# Patient Record
Sex: FEMALE | Race: WHITE | NOT HISPANIC OR LATINO
[De-identification: names, ages, dates, MRNs, and addresses within clinical notes are randomized per-mention and may not be internally consistent; named-entity substitution may affect disease eponyms.]

---

## 2017-01-18 ENCOUNTER — CHARTING TRANS (OUTPATIENT)
Dept: OTHER | Age: 82
End: 2017-01-18

## 2017-01-24 ENCOUNTER — CHARTING TRANS (OUTPATIENT)
Dept: OTHER | Age: 82
End: 2017-01-24

## 2017-02-01 ENCOUNTER — CHARTING TRANS (OUTPATIENT)
Dept: OTHER | Age: 82
End: 2017-02-01

## 2017-03-25 ENCOUNTER — CHARTING TRANS (OUTPATIENT)
Dept: OTHER | Age: 82
End: 2017-03-25

## 2017-03-27 ENCOUNTER — CHARTING TRANS (OUTPATIENT)
Dept: OTHER | Age: 82
End: 2017-03-27

## 2017-04-04 ENCOUNTER — LAB SERVICES (OUTPATIENT)
Dept: OTHER | Age: 82
End: 2017-04-04

## 2017-04-04 ENCOUNTER — CHARTING TRANS (OUTPATIENT)
Dept: OTHER | Age: 82
End: 2017-04-04

## 2017-04-05 ENCOUNTER — CHARTING TRANS (OUTPATIENT)
Dept: OTHER | Age: 82
End: 2017-04-05

## 2017-04-05 LAB
ALBUMIN SERPL-MCNC: 3.6 G/DL (ref 3.6–5.1)
ALBUMIN/GLOB SERPL: 1.2 (ref 1–2.4)
ALP SERPL-CCNC: 98 UNITS/L (ref 45–117)
ALT SERPL-CCNC: 14 UNITS/L
ANION GAP SERPL CALC-SCNC: 10 MMOL/L (ref 10–20)
APPEARANCE UR: CLEAR
AST SERPL-CCNC: 12 UNITS/L
BASOPHILS # BLD: 0.1 K/MCL (ref 0–0.3)
BASOPHILS NFR BLD: 1 %
BILIRUB SERPL-MCNC: 0.8 MG/DL (ref 0.2–1)
BILIRUB UR QL: NEGATIVE
BUN SERPL-MCNC: 19 MG/DL (ref 6–20)
BUN/CREAT SERPL: 21 (ref 7–25)
CALCIUM SERPL-MCNC: 10.2 MG/DL (ref 8.4–10.2)
CHLORIDE SERPL-SCNC: 101 MMOL/L (ref 98–107)
CHOLEST SERPL-MCNC: 140 MG/DL
CHOLEST/HDLC SERPL: 2.1
CO2 SERPL-SCNC: 32 MMOL/L (ref 21–32)
COLOR UR: YELLOW
CREAT SERPL-MCNC: 0.9 MG/DL (ref 0.51–0.95)
DIFFERENTIAL METHOD BLD: ABNORMAL
EOSINOPHIL # BLD: 0.1 K/MCL (ref 0.1–0.5)
EOSINOPHIL NFR BLD: 1 %
ERYTHROCYTE [DISTWIDTH] IN BLOOD: 14.5 % (ref 11–15)
FOLATE SERPL-MCNC: 8.2 NG/ML
GLOBULIN SER-MCNC: 3.1 G/DL (ref 2–4)
GLUCOSE SERPL-MCNC: 166 MG/DL (ref 65–99)
GLUCOSE UR-MCNC: NEGATIVE MG/DL
HBA1C MFR BLD: 6.6 % (ref 4.5–5.6)
HDLC SERPL-MCNC: 67 MG/DL
HEMATOCRIT: 38.4 % (ref 36–46.5)
HEMOGLOBIN: 11.7 G/DL (ref 12–15.5)
KETONES UR-MCNC: NEGATIVE MG/DL
LDLC SERPL CALC-MCNC: 54 MG/DL
LENGTH OF FAST TIME PATIENT: ABNORMAL HRS
LENGTH OF FAST TIME PATIENT: ABNORMAL HRS
LYMPHOCYTES # BLD: 0.8 K/MCL (ref 1–4)
LYMPHOCYTES NFR BLD: 12 %
MEAN CORPUSCULAR HEMOGLOBIN: 26.5 PG (ref 26–34)
MEAN CORPUSCULAR HGB CONC: 30.5 G/DL (ref 32–36.5)
MEAN CORPUSCULAR VOLUME: 86.9 FL (ref 78–100)
MONOCYTES # BLD: 0.5 K/MCL (ref 0.3–0.9)
MONOCYTES NFR BLD: 7 %
NEUTROPHILS # BLD: 5.4 K/MCL (ref 1.8–7.7)
NEUTROPHILS NFR BLD: 79 %
NITRITE UR QL: NEGATIVE
NONHDLC SERPL-MCNC: 73 MG/DL
PH UR: 6 UNITS (ref 5–7)
PLATELET COUNT: 208 K/MCL (ref 140–450)
POTASSIUM SERPL-SCNC: 4 MMOL/L (ref 3.4–5.1)
PROT UR QL: NEGATIVE MG/DL
RBC-URINE: NEGATIVE
RED CELL COUNT: 4.42 MIL/MCL (ref 4–5.2)
RPR SER QL: NONREACTIVE
SODIUM SERPL-SCNC: 139 MMOL/L (ref 135–145)
SP GR UR: 1.01 (ref 1–1.03)
SPECIMEN SOURCE: NORMAL
TOTAL PROTEIN: 6.7 G/DL (ref 6.4–8.2)
TRIGL SERPL-MCNC: 95 MG/DL
TSH SERPL-ACNC: 1.49 MCUNITS/ML (ref 0.35–5)
UROBILINOGEN UR QL: 0.2 MG/DL (ref 0–1)
VIT B12 SERPL-MCNC: 1059 PG/ML (ref 211–911)
WBC-URINE: NEGATIVE
WHITE BLOOD COUNT: 6.8 K/MCL (ref 4.2–11)

## 2017-05-02 ENCOUNTER — CHARTING TRANS (OUTPATIENT)
Dept: OTHER | Age: 82
End: 2017-05-02

## 2017-05-31 ENCOUNTER — CHARTING TRANS (OUTPATIENT)
Dept: OTHER | Age: 82
End: 2017-05-31

## 2017-05-31 ASSESSMENT — PAIN SCALES - GENERAL: PAINLEVEL_OUTOF10: 1

## 2017-06-16 ENCOUNTER — CHARTING TRANS (OUTPATIENT)
Dept: OTHER | Age: 82
End: 2017-06-16

## 2017-06-21 ENCOUNTER — LAB ENCOUNTER (OUTPATIENT)
Dept: LAB | Age: 82
End: 2017-06-21
Attending: INTERNAL MEDICINE
Payer: MEDICARE

## 2017-06-21 DIAGNOSIS — R69 DIAGNOSIS UNKNOWN: Primary | ICD-10-CM

## 2017-06-21 PROCEDURE — 36415 COLL VENOUS BLD VENIPUNCTURE: CPT

## 2017-06-21 PROCEDURE — 80053 COMPREHEN METABOLIC PANEL: CPT

## 2017-06-21 PROCEDURE — 85025 COMPLETE CBC W/AUTO DIFF WBC: CPT

## 2017-06-28 ENCOUNTER — LAB ENCOUNTER (OUTPATIENT)
Dept: LAB | Age: 82
End: 2017-06-28
Attending: INTERNAL MEDICINE
Payer: MEDICARE

## 2017-06-28 DIAGNOSIS — S42.302A FRACTURE OF LEFT HUMERUS: Primary | ICD-10-CM

## 2017-06-28 PROCEDURE — 85025 COMPLETE CBC W/AUTO DIFF WBC: CPT

## 2017-06-28 PROCEDURE — 36415 COLL VENOUS BLD VENIPUNCTURE: CPT

## 2017-06-28 PROCEDURE — 80053 COMPREHEN METABOLIC PANEL: CPT

## 2017-07-04 ENCOUNTER — APPOINTMENT (OUTPATIENT)
Dept: CT IMAGING | Facility: HOSPITAL | Age: 82
DRG: 552 | End: 2017-07-04
Attending: EMERGENCY MEDICINE
Payer: MEDICARE

## 2017-07-04 ENCOUNTER — HOSPITAL ENCOUNTER (INPATIENT)
Facility: HOSPITAL | Age: 82
LOS: 16 days | Discharge: HOME HEALTH | DRG: 552 | End: 2017-07-22
Attending: EMERGENCY MEDICINE | Admitting: SPECIALIST
Payer: MEDICARE

## 2017-07-04 DIAGNOSIS — S12.100S TRAUMATIC CLOSED FRACTURE OF C2 VERTEBRA WITH MINIMAL DISPLACEMENT, SEQUELA: Primary | ICD-10-CM

## 2017-07-04 PROBLEM — S12.100A: Status: ACTIVE | Noted: 2017-07-04

## 2017-07-04 LAB — GLUCOSE BLD-MCNC: 149 MG/DL (ref 65–99)

## 2017-07-04 PROCEDURE — 72125 CT NECK SPINE W/O DYE: CPT | Performed by: EMERGENCY MEDICINE

## 2017-07-04 PROCEDURE — 82962 GLUCOSE BLOOD TEST: CPT

## 2017-07-04 RX ORDER — MECLIZINE HCL 12.5 MG/1
12.5 TABLET ORAL 3 TIMES DAILY PRN
Status: DISCONTINUED | OUTPATIENT
Start: 2017-07-04 | End: 2017-07-22

## 2017-07-04 RX ORDER — SIMVASTATIN 20 MG
20 TABLET ORAL NIGHTLY
COMMUNITY
End: 2021-01-01

## 2017-07-04 RX ORDER — CLOPIDOGREL BISULFATE 75 MG/1
75 TABLET ORAL DAILY
COMMUNITY

## 2017-07-04 RX ORDER — HEPARIN SODIUM 5000 [USP'U]/ML
5000 INJECTION, SOLUTION INTRAVENOUS; SUBCUTANEOUS EVERY 12 HOURS SCHEDULED
Status: DISCONTINUED | OUTPATIENT
Start: 2017-07-04 | End: 2017-07-22

## 2017-07-04 RX ORDER — ATORVASTATIN CALCIUM 10 MG/1
10 TABLET, FILM COATED ORAL NIGHTLY
Status: DISCONTINUED | OUTPATIENT
Start: 2017-07-04 | End: 2017-07-22

## 2017-07-04 RX ORDER — SODIUM CHLORIDE 9 MG/ML
INJECTION, SOLUTION INTRAVENOUS CONTINUOUS
Status: ACTIVE | OUTPATIENT
Start: 2017-07-04 | End: 2017-07-04

## 2017-07-04 RX ORDER — FUROSEMIDE 20 MG/1
20 TABLET ORAL 2 TIMES DAILY
Status: ON HOLD | COMMUNITY
End: 2017-07-08

## 2017-07-04 RX ORDER — HYDROCODONE BITARTRATE AND ACETAMINOPHEN 5; 325 MG/1; MG/1
1 TABLET ORAL EVERY 6 HOURS PRN
Status: ON HOLD | COMMUNITY
End: 2017-07-07

## 2017-07-04 RX ORDER — DONEPEZIL HYDROCHLORIDE 10 MG/1
10 TABLET, FILM COATED ORAL NIGHTLY
COMMUNITY
End: 2018-03-01

## 2017-07-04 RX ORDER — DONEPEZIL HYDROCHLORIDE 10 MG/1
10 TABLET, FILM COATED ORAL NIGHTLY
Status: DISCONTINUED | OUTPATIENT
Start: 2017-07-04 | End: 2017-07-22

## 2017-07-04 RX ORDER — ONDANSETRON 2 MG/ML
4 INJECTION INTRAMUSCULAR; INTRAVENOUS EVERY 6 HOURS PRN
Status: DISCONTINUED | OUTPATIENT
Start: 2017-07-04 | End: 2017-07-22

## 2017-07-04 RX ORDER — DOCUSATE SODIUM 100 MG/1
100 CAPSULE, LIQUID FILLED ORAL AS NEEDED
COMMUNITY

## 2017-07-04 RX ORDER — CLOPIDOGREL BISULFATE 75 MG/1
75 TABLET ORAL DAILY
Status: DISCONTINUED | OUTPATIENT
Start: 2017-07-04 | End: 2017-07-22

## 2017-07-04 RX ORDER — ONDANSETRON 2 MG/ML
4 INJECTION INTRAMUSCULAR; INTRAVENOUS EVERY 4 HOURS PRN
Status: DISCONTINUED | OUTPATIENT
Start: 2017-07-04 | End: 2017-07-04

## 2017-07-04 RX ORDER — HYDROMORPHONE HYDROCHLORIDE 1 MG/ML
0.5 INJECTION, SOLUTION INTRAMUSCULAR; INTRAVENOUS; SUBCUTANEOUS EVERY 30 MIN PRN
Status: DISCONTINUED | OUTPATIENT
Start: 2017-07-04 | End: 2017-07-04

## 2017-07-04 RX ORDER — DOCUSATE SODIUM 100 MG/1
100 CAPSULE, LIQUID FILLED ORAL NIGHTLY
Status: DISCONTINUED | OUTPATIENT
Start: 2017-07-04 | End: 2017-07-22

## 2017-07-04 RX ORDER — MECLIZINE HCL 12.5 MG/1
12.5 TABLET ORAL 3 TIMES DAILY PRN
Status: ON HOLD | COMMUNITY
End: 2017-07-20

## 2017-07-04 RX ORDER — HYDROMORPHONE HYDROCHLORIDE 1 MG/ML
0.5 INJECTION, SOLUTION INTRAMUSCULAR; INTRAVENOUS; SUBCUTANEOUS EVERY 4 HOURS PRN
Status: DISCONTINUED | OUTPATIENT
Start: 2017-07-04 | End: 2017-07-07

## 2017-07-04 RX ORDER — FUROSEMIDE 20 MG/1
20 TABLET ORAL
Status: DISCONTINUED | OUTPATIENT
Start: 2017-07-04 | End: 2017-07-08

## 2017-07-04 NOTE — ED NOTES
[facility] called, this RN spoke to patient's nurse requesting for medical records to be faxed to [facility] due to being unable to obtain medical records from [facility]. Per nurse at [facility], medical records to be fa

## 2017-07-04 NOTE — ED NOTES
Patient to CT via stretcher by PCT Yuliya Chatterjee. Patient cleaned from having bowel movement, new depends brief applied. Patient tolerated well. Family remains at bedside.

## 2017-07-04 NOTE — ED PROVIDER NOTES
Patient Seen in: [facility] Emergency Department    History   Patient presents with:  Neck Pain (musculoskeletal, neurologic)    Stated Complaint: neck pain, hx of c2 fx    HPI    Patient presents with neck pain.   The patient fell 3 weeks ago and pacheco history on file. Tobacco/Alcohol use not on file    Review of Systems    Positive for stated complaint: neck pain, hx of c2 fx  Other systems are as noted in HPI. Constitutional and vital signs reviewed.       All other systems reviewed and negative exc transmitted to the [facility]) Bhakti Reyes [address] ([address]) which includes the Dose Index Registry.   PATIENT STATED HISTORY: (As transcribed by Technologist)  C2 fx, complains of neck pain to right shoulder    FINDINGS: was performed.    Dictated by: Krystian Ludwig MD on 7/04/2017 at 15:48     Approved by: Krystian Ludwig MD          ============================================================  ED Course  ------------------------------------------------------------

## 2017-07-04 NOTE — PROGRESS NOTES
Patient arrived to floor, no family at bedside, patient is non-verbal, aspen collar in place, does not appear to be in pain, paged for admitting orders

## 2017-07-04 NOTE — ED INITIAL ASSESSMENT (HPI)
81 y/o female to ED with c/o of neck pain with radiation to right shoulder. [place] [facility] [place] to getting rehab for C2 fx she was dx with x3 weeks ago after having a fall.  Per daughter, patient was crying in the morning due to pain

## 2017-07-04 NOTE — ED NOTES
Report given to floor RN Angie Myers, patient continues to rest, equal chest and rise. Patient VSS, patient awaiting transportation, patient has Aspen collar in place.

## 2017-07-05 ENCOUNTER — LAB ENCOUNTER (OUTPATIENT)
Dept: LAB | Age: 82
End: 2017-07-05
Attending: INTERNAL MEDICINE
Payer: MEDICARE

## 2017-07-05 ENCOUNTER — APPOINTMENT (OUTPATIENT)
Dept: GENERAL RADIOLOGY | Facility: HOSPITAL | Age: 82
DRG: 552 | End: 2017-07-05
Attending: ORTHOPAEDIC SURGERY
Payer: MEDICARE

## 2017-07-05 DIAGNOSIS — S42.411A RIGHT SUPRACONDYLAR HUMERUS FRACTURE: Primary | ICD-10-CM

## 2017-07-05 LAB
GLUCOSE BLD-MCNC: 124 MG/DL (ref 65–99)
GLUCOSE BLD-MCNC: 170 MG/DL (ref 65–99)
GLUCOSE BLD-MCNC: 181 MG/DL (ref 65–99)
GLUCOSE BLD-MCNC: 218 MG/DL (ref 65–99)

## 2017-07-05 PROCEDURE — 87081 CULTURE SCREEN ONLY: CPT | Performed by: SPECIALIST

## 2017-07-05 PROCEDURE — 82962 GLUCOSE BLOOD TEST: CPT

## 2017-07-05 PROCEDURE — 99221 1ST HOSP IP/OBS SF/LOW 40: CPT | Performed by: NEUROLOGICAL SURGERY

## 2017-07-05 PROCEDURE — 73030 X-RAY EXAM OF SHOULDER: CPT | Performed by: ORTHOPAEDIC SURGERY

## 2017-07-05 RX ORDER — TRAMADOL HYDROCHLORIDE 50 MG/1
50 TABLET ORAL EVERY 12 HOURS PRN
Status: DISCONTINUED | OUTPATIENT
Start: 2017-07-05 | End: 2017-07-08

## 2017-07-05 RX ORDER — ACETAMINOPHEN 325 MG/1
650 TABLET ORAL EVERY 6 HOURS PRN
Status: DISCONTINUED | OUTPATIENT
Start: 2017-07-05 | End: 2017-07-22

## 2017-07-05 RX ORDER — DEXTROSE AND SODIUM CHLORIDE 5; .45 G/100ML; G/100ML
INJECTION, SOLUTION INTRAVENOUS CONTINUOUS
Status: DISCONTINUED | OUTPATIENT
Start: 2017-07-05 | End: 2017-07-06

## 2017-07-05 NOTE — PROGRESS NOTES
Received report from PM RN. Patient has removed aspen collar. Attempted to use phone  to try to tell patient to leave collar in place, patient responding as she does not understand. Aspen collar reapplied.  Patient told \"niet\" when attempting to

## 2017-07-05 NOTE — PLAN OF CARE
Diabetes/Glucose Control    • Glucose maintained within prescribed range Progressing        PAIN - ADULT    • Verbalizes/displays adequate comfort level or patient's stated pain goal Progressing        SAFETY ADULT - FALL    • Free from fall injury Progres

## 2017-07-05 NOTE — H&P
[facility]    PATIENT'S NAME: Rahel García   ATTENDING PHYSICIAN: Micheal Hill M.D.    PATIENT ACCOUNT#:   [de-identified]    LOCATION:  [address] [place] [address]  MEDICAL RECORD #:   [id number]       YOB: 1924  ADMISSION DATE:       07/04/2017 She has known fractures of right upper extremity and C2 fracture. SKIN:  Defer to nursing. LABORATORY DATA:  Blood glucose 170. CBC, CMP currently pending. IMPRESSION AND PLAN:    1. History of prior fall, history of prior C2 fracture.   Currentl

## 2017-07-05 NOTE — SLP NOTE
ADULT SWALLOWING EVALUATION    ASSESSMENT & PLAN   ASSESSMENT  Pt seen at bedside this PM for swallow evaluation. Speech consulted due to pt admitting with modified diet consistency. Pt cooperative, pleasant, and alert.  No family or caregivers present at b Admission:  Thin liquids;Puree  Precautions: Aspiration;Cervical brace    Comments: Patient is an elderly female with history of neck pain and a fall around 3 weeks back and sustained a C2 fracture and was treated and admitted in another outside hospital. #1 Video swallow study to be completed     FOLLOW UP  Treatment Plan: Videofluoroscopic swallow study  Number of Visits to Meet Established Goals: 2  Follow Up Needed: Yes  SLP Follow-up Date: 07/06/17    Thank you for your referral.   If you have any ques

## 2017-07-05 NOTE — CONSULTS
[facility] Patient Status:  Observation    1924 MRN [id number]   [facility] 3SW-A Attending Peace Busby MD   Hosp Day # 0 PCP Jazzmine Obrien MD     REASON FOR CONSULTATION:  Clemencia Hanks hours as needed for Pain. Disp:  Rfl:     simvastatin 20 MG Oral Tab Take 20 mg by mouth nightly. Disp:  Rfl:     Meclizine HCl 12.5 MG Oral Tab Take 12.5 mg by mouth 3 (three) times daily as needed.  Disp:  Rfl:     Donepezil HCl 10 MG Oral Tab Take 10 mg There is approximately 7.3 mm of posterior displacement of the odontoid in comparison to the posterior C2 body. There is some calcification and bony fragments along the posterior border of the odontoid.  There is some soft tissue prominence in this region a

## 2017-07-05 NOTE — CM/SW NOTE
07/05/17 1300   CM/SW Referral Data   Referral Source Nurse   Reason for Referral Discharge planning   Informant Children;Edward Staff   Pertinent Medical Hx   Primary Care Physician Name HANNAH Coulter MD   Patient Info   Patient's Mental Status Alert   [place] office. Nursing home Medicaid guide located via [employer] -printed for son re: spousal impoverishment law and pt income to NH. Update sent via [facility] to [facility]. Will keep facility updated re: d/c date.

## 2017-07-05 NOTE — CONSULTS
[facility]  Report of Consultation    Matti Bahena Patient Status:  Observation    1924 MRN [id number]   [facility] 3SW-A Attending Marcello Alas MD   Hosp Day # 0 PCP Karmen Perdomo MD     Reason for Consultation:  R shoulder nystatin-triamcinolone (MYCOLOG II) cream, , Topical, QID  •  atorvastatin (LIPITOR) tab 10 mg, 10 mg, Oral, Nightly    Review of Systems:  A comprehensive review of systems was negative. Physical Exam:    General: Alert, orientated x1. Cooperative.   Shelley Cage sequela      R prox humerus fx    X-rays show good healing of the fx, likely older than the stated 3 wks  As the patient has min pain, OK to go without sling  WBAT  R UE, can move shoulder as tolerated  Will sign off, please have the patient f/u as outpati

## 2017-07-05 NOTE — PAYOR COMM NOTE
--------------  ADMISSION REVIEW     Payor: MEDICARE PART B ONLY  Subscriber #:  [id number]  Authorization Number: N/A    Admit date: 7/4/2017  1:31 PM       Admitting Physician: Parish Leal MD  Attending Physician:   Parish Leal MD  Primary Care Bisulfate 75 MG Oral Tab,  Take 75 mg by mouth daily. HYDROcodone-acetaminophen 5-325 MG Oral Tab,  Take 1 tablet by mouth every 6 (six) hours as needed for Pain.   simvastatin 20 MG Oral Tab,  Take 20 mg by mouth nightly.    Meclizine HCl 12.5 MG Oral Ta SPINE WITHOUT CONTRAST  COMPARISON:  None. INDICATIONS:  neck pain, hx of c2 fx  TECHNIQUE:  Noncontrast CT scanning of the cervical spine is performed from the skull base through C7. Multiplanar reconstructions are generated.   Dose reduction techniques the canal at this level is 1.06 cm. The dimension of the foramen magnum is 1.6 cm. On the lateral views, there is posterior subluxation of the lateral masses of C1 in relationship to C2.    The critical value results were called to Dr. Velasquez Kumari at [facility] Date Action Dose Route User    7/4/2017 2053 Given 75 mg Oral Felicity Escamilla RN      Donepezil HCl (ARICEPT) tab 10 mg     Date Action Dose Route User    7/4/2017 2054 Given 10 mg Oral Felicity Escamilla RN      Heparin Sodium (Porcine) 5000 UNIT/ML inj

## 2017-07-06 ENCOUNTER — APPOINTMENT (OUTPATIENT)
Dept: CT IMAGING | Facility: HOSPITAL | Age: 82
DRG: 552 | End: 2017-07-06
Attending: Other
Payer: MEDICARE

## 2017-07-06 ENCOUNTER — APPOINTMENT (OUTPATIENT)
Dept: GENERAL RADIOLOGY | Facility: HOSPITAL | Age: 82
DRG: 552 | End: 2017-07-06
Attending: SPECIALIST
Payer: MEDICARE

## 2017-07-06 LAB
ALBUMIN SERPL-MCNC: 2.6 G/DL (ref 3.5–4.8)
ALP LIVER SERPL-CCNC: 107 U/L (ref 55–142)
ALT SERPL-CCNC: 9 U/L (ref 14–54)
AST SERPL-CCNC: 8 U/L (ref 15–41)
BASOPHILS # BLD AUTO: 0.01 X10(3) UL (ref 0–0.1)
BASOPHILS NFR BLD AUTO: 0.1 %
BILIRUB SERPL-MCNC: 0.8 MG/DL (ref 0.1–2)
BUN BLD-MCNC: 28 MG/DL (ref 8–20)
CALCIUM BLD-MCNC: 9.5 MG/DL (ref 8.3–10.3)
CHLORIDE: 102 MMOL/L (ref 101–111)
CO2: 31 MMOL/L (ref 22–32)
CREAT BLD-MCNC: 0.84 MG/DL (ref 0.55–1.02)
EOSINOPHIL # BLD AUTO: 0.11 X10(3) UL (ref 0–0.3)
EOSINOPHIL NFR BLD AUTO: 1.2 %
ERYTHROCYTE [DISTWIDTH] IN BLOOD BY AUTOMATED COUNT: 14.3 % (ref 11.5–16)
FREE T4: 1.3 NG/DL (ref 0.9–1.8)
GLUCOSE BLD-MCNC: 201 MG/DL (ref 65–99)
GLUCOSE BLD-MCNC: 229 MG/DL (ref 65–99)
GLUCOSE BLD-MCNC: 229 MG/DL (ref 70–99)
GLUCOSE BLD-MCNC: 235 MG/DL (ref 65–99)
GLUCOSE BLD-MCNC: 258 MG/DL (ref 65–99)
HAV AB SERPL IA-ACNC: 1304 PG/ML (ref 193–986)
HCT VFR BLD AUTO: 30.2 % (ref 34–50)
HCT VFR BLD AUTO: 32.2 % (ref 34–50)
HGB BLD-MCNC: 9.3 G/DL (ref 12–16)
IMMATURE GRANULOCYTE COUNT: 0.07 X10(3) UL (ref 0–1)
IMMATURE GRANULOCYTE RATIO %: 0.8 %
LYMPHOCYTES # BLD AUTO: 0.81 X10(3) UL (ref 0.9–4)
LYMPHOCYTES NFR BLD AUTO: 9.2 %
M PROTEIN MFR SERPL ELPH: 6 G/DL (ref 6.1–8.3)
MCH RBC QN AUTO: 27.8 PG (ref 27–33.2)
MCHC RBC AUTO-ENTMCNC: 30.8 G/DL (ref 31–37)
MCV RBC AUTO: 90.4 FL (ref 81–100)
MONOCYTES # BLD AUTO: 0.73 X10(3) UL (ref 0.1–0.6)
MONOCYTES NFR BLD AUTO: 8.3 %
NEUTROPHIL ABS PRELIM: 7.1 X10 (3) UL (ref 1.3–6.7)
NEUTROPHILS # BLD AUTO: 7.1 X10(3) UL (ref 1.3–6.7)
NEUTROPHILS NFR BLD AUTO: 80.4 %
PLATELET # BLD AUTO: 276 10(3)UL (ref 150–450)
POTASSIUM SERPL-SCNC: 3.5 MMOL/L (ref 3.6–5.1)
RBC # BLD AUTO: 3.34 X10(6)UL (ref 3.8–5.1)
RED CELL DISTRIBUTION WIDTH-SD: 46.9 FL (ref 35.1–46.3)
SED RATE-ML: 52 MM/HR (ref 0–25)
SODIUM SERPL-SCNC: 137 MMOL/L (ref 136–144)
TSI SER-ACNC: 1.25 MIU/ML (ref 0.35–5.5)
WBC # BLD AUTO: 8.8 X10(3) UL (ref 4–13)

## 2017-07-06 PROCEDURE — 95816 EEG AWAKE AND DROWSY: CPT | Performed by: OTHER

## 2017-07-06 PROCEDURE — 99222 1ST HOSP IP/OBS MODERATE 55: CPT | Performed by: OTHER

## 2017-07-06 PROCEDURE — 82962 GLUCOSE BLOOD TEST: CPT

## 2017-07-06 PROCEDURE — 70450 CT HEAD/BRAIN W/O DYE: CPT | Performed by: OTHER

## 2017-07-06 PROCEDURE — 74230 X-RAY XM SWLNG FUNCJ C+: CPT | Performed by: SPECIALIST

## 2017-07-06 RX ORDER — DEXTROSE AND SODIUM CHLORIDE 5; .9 G/100ML; G/100ML
INJECTION, SOLUTION INTRAVENOUS CONTINUOUS
Status: DISCONTINUED | OUTPATIENT
Start: 2017-07-06 | End: 2017-07-22

## 2017-07-06 NOTE — OCCUPATIONAL THERAPY NOTE
OCCUPATIONAL THERAPY EVALUATION - INPATIENT     Room Number: 351/351-A  Evaluation Date: 7/6/2017  Type of Evaluation: Initial  Presenting Problem: Neck pain, recent C2 fx 3 weeks ago    Physician Order: IP Consult to Occupational Therapy  [facility]; patient required assist for bathing and sock management, otherwise independent for all BADLs, bed mobility and functional mobility without device; caregiver primarily assisted with IADLs.  Daughter-in-law reports since being at [facility] the last tw not tested due to fracture    COORDINATION  Gross Motor    WFL ELIZABETH BELL limited   Fine Motor    WFL      ADDITIONAL TESTS                                    NEUROLOGICAL FINDINGS  Neurological Findings: None                ACTIVITY TOLERANCE  O2 Saturation to min assist. Patient also educated on safety, fall prevention, OT role, care plan and discharge recommendations.  Patient's neck noted to be tilted to right in Colby collar; notified RN, who states neurosurgery APN is aware; per RN request left as-is and activities; ADL training;Functional transfer training; Endurance training;Patient/Family education;Patient/Family training  Rehab Potential : Fair  Frequency (Obs): 5x/week  Number of Visits to Meet Established Goals: 5    ADL Goals  Patient will perform upp

## 2017-07-06 NOTE — PLAN OF CARE
Impaired Swallowing    • Minimize aspiration risk Progressing        PAIN - ADULT    • Verbalizes/displays adequate comfort level or patient's stated pain goal Progressing        Patient/Family Goals    • Patient/Family Long Term Goal Progressing    • Justa

## 2017-07-06 NOTE — PROGRESS NOTES
[facility]  Progress Note    Malachi Apgar Patient Status:  Inpatient    1924 MRN [id number]   [facility] 3SW-A Attending Emilia Sweeney MD   Hosp Day # 0 PCP Dolores Singleton MD         SUBJECTIVE:  Subjective:  Malachi Apgar is a(n times per day   • Clopidogrel Bisulfate  75 mg Oral Daily   • docusate sodium  100 mg Oral Nightly   • Donepezil HCl  10 mg Oral Nightly   • furosemide  20 mg Oral BID (Diuretic)   • MetFORMIN HCl  500 mg Oral Daily with breakfast   • nystatin-triamcinolon

## 2017-07-06 NOTE — PHYSICAL THERAPY NOTE
PHYSICAL THERAPY EVALUATION - INPATIENT     Room Number: 351/351-A  Evaluation Date: 7/6/2017  Type of Evaluation: Initial  Physician Order: PT Eval and Treat    Presenting Problem: S/p Fall - 3 weeks ago - C2 Fx, Right proximal Humerus Fx  Reason for [facility] for past 2 weeks after [facility] & was receiving P.T.  SUBJECTIVE  Patient reported to have pain neck & right shoulder    Patient self-stated goal: Unable to establish this time.  Daughter in law stated their family Hanna Raman bed?: A Lot (Supine to sit with mod assist,Sit to supine with min assist)   How much help from another person does the patient currently need. ..   -   Moving to and from a bed to a chair (including a wheelchair)?: A Little   -   Need to walk in hospital ro recommendations with /    ASSESSMENT     Patient is a 80year old female admitted 7/4/2017 for S/p Fall - 3 weeks ago - C2 Fx, Right proximal Humerus Fx.    In this PT evaluation, the patient presents with the following impairments:

## 2017-07-06 NOTE — CONSULTS
Neurology H&P    Coreen Bolivar Patient Status:  Inpatient    1924 MRN [id number]   [facility] 3SW-A Attending Amy Feldman MD   Hosp Day # 0 PCP Tung Escamilla MD     Subjective:  Coreen Bolivar is a(n) 80year old [place] speaking fem weight loss  Vision: no vision changes, no new blurry or double vision  Head and Neck: no eye or ear discharge  Pulmonary: no SOB, no new cough  CV: no chest pain, no new lower extremity swelling  GI: no constipation or abdominal pain  : denies frequent 1,304 07/06/2017   PGLU 201 07/06/2017       Imaging:  [facility] 7/6/17  CONCLUSION:  No acute intracranial abnormality identified.  Moderate age-indeterminate microvascular ischemic changes in the cerebral white matter along with multiple scattered age-indetermi

## 2017-07-06 NOTE — PROGRESS NOTES
Patient's son brought in CT imaging from [address] this evening. Discs sent to CT scan to be uploaded to [employer] system for review by Dr Eri Masters.

## 2017-07-06 NOTE — PLAN OF CARE
Diabetes/Glucose Control    • Glucose maintained within prescribed range Progressing        Impaired Swallowing    • Minimize aspiration risk Progressing        PAIN - ADULT    • Verbalizes/displays adequate comfort level or patient's stated pain goal Prog

## 2017-07-06 NOTE — PROGRESS NOTES
[address] [facility] Progress Note    Jodie Sanford Patient Status:  Inpatient    1924 MRN [id number]   [facility] 3SW-A Attending Mason Luna MD   Hosp Day # 0 PCP Enma Weiss MD     Subjective:  Derricklin Juvenal is a(n

## 2017-07-06 NOTE — PROGRESS NOTES
[address] [facility] Preliminary Note    Champ Cull Patient Status:  Inpatient    1924 MRN [id number]   [facility] 3SW-A Attending Marcello Alas MD   Hosp Day # 0 PCP Karmen Perdomo MD     REASON FOR EVALUATION:  Altered per day   Clopidogrel Bisulfate (PLAVIX) tab 75 mg 75 mg Oral Daily   docusate sodium (COLACE) cap 100 mg 100 mg Oral Nightly   Donepezil HCl (ARICEPT) tab 10 mg 10 mg Oral Nightly   furosemide (LASIX) tab 20 mg 20 mg Oral BID (Diuretic)   Meclizine HCl (A 07/06/2017    07/06/2017   CO2 31.0 07/06/2017    07/06/2017   CA 9.5 07/06/2017   ALB 2.6 07/06/2017   ALKPHO 107 07/06/2017   BILT 0.8 07/06/2017   TP 6.0 07/06/2017   AST 8 07/06/2017   ALT 9 07/06/2017   PGLU 235 07/06/2017              IM

## 2017-07-06 NOTE — PROGRESS NOTES
Patient's son at bedside, frustrated with current NPO status per speech therapy. Explained to family that due to our limited record access we are treating her cautiously as all ST notes are showing that she has failed several swallow evaluations.  Swallow s

## 2017-07-06 NOTE — PLAN OF CARE
Spoke with Dr. Loulou Rooeny regarding speech therapy recommendations - will consult GI. Spoke with Dr. Fazal Perkins, will see patient tomorrow. [place] [facility]aj [facility]. Will continue to monitor.

## 2017-07-06 NOTE — VIDEO SWALLOW STUDY NOTE
ADULT VIDEOFLUOROSCOPIC SWALLOWING STUDY    Admission Date: 7/4/2017  Evaluation Date: 07/06/17  Radiologist: Dr. Umm Tamez  .     PLAN   Diet Recommendations - Solids:  (alternative feeding route)  Diet Recommendations - Liquid:  (alternative feeding route consistencies. Unable to assess volitional cough/swallow and oral motor abilities due to language barrier and decreased cognitive status. Trial thin liquids via cup, self presented, with no overt s/s of aspiration.  Trial pureed with no overt s/s of aspi 1.55    min.          Findings:        Penetration on the nectar thick consistency of barium and aspiration on the honey thick consistency.        For further details and dietary recommendations, please refer to the full report by speech pathology.      spillage into pharynx  (lingual pumping)   Thin Liquid, Oral Control Impairments (Vfss) formation of cohesive bolus is impaired;oral containment of liquids impaired posteriorly   Thin Liquid, Pharyngeal Phase Impairment material reaches pyriform sinuses pr swallow;decreased tonque base movement;reduction in laryngeal closure;decreased contraction;decreased coordination;pyriform pooling;decreased epiglottic inversion;penetration during;vallecular residue;pharyngeal wall residue;mild;moderate;repeated swallow movement;decreased hyolaryngeal elevation;decreased contraction;decreased coordination;vallecular pooling;decreased epiglottic inversion;pharyngeal wall residue;vallecular residue;aspiration after;moderate-severe;mild;repeated swallow   Emily Zeng residue;vallecular residue;moderate-severe;mild;repeated swallow   Puree, Rosenbek's Scale 1-->no aspiration, contrast does not enter airway   Puree, Response to Pharyngeal Residue (Vfss) unable to clear with multiple swallows   Puree, Epiglottic Movement epiglottic inversion with deep penetration of nectar thick liquid to the vocal folds, and suspected silent aspiration after the swallow.  Moderate to moderate-severe vallecular residue is noted across all consistencies which pt is unable to clear with repea hypertension        Current Diet Consistency: NPO  Prior Level of Function: Dependent  Prior Living Situation: Skilled nursing facility  History of Recent: No recent respiratory difficulty  Precautions: Aspiration;Cervical brace      Reason for Referral: R Spillage to: Valleculae  Delay (seconds):  (1/2 sec)  Residue Severity, Location: Severe;Valleculae;Pyriform sinuses  Cleared/Reduced with: Attempted however ineffective; Secondary swallow  Laryngeal Penetration: After the swallow  Tracheal Aspiration:  (no puree texture into the superior laryngeal vestibule. No cough in response to penetration. Swallow response initiated at the level of the pyriform sinuses on the initial swallow and at the level of the valleculae with all other trials.   Reduced hyolaryngea perform Mendelsohn exercises to promote improved epiglottic inversion and hyolaryngeal elevation for improved CP opening wit 80% acc with max cues.    Goal #4 Patient will perform Janis Maneuver for improved strengthening of of tongue base to promote impro

## 2017-07-07 ENCOUNTER — APPOINTMENT (OUTPATIENT)
Dept: GENERAL RADIOLOGY | Facility: HOSPITAL | Age: 82
DRG: 552 | End: 2017-07-07
Attending: INTERNAL MEDICINE
Payer: MEDICARE

## 2017-07-07 PROBLEM — Z71.89 GOALS OF CARE, COUNSELING/DISCUSSION: Status: ACTIVE | Noted: 2017-07-07

## 2017-07-07 PROBLEM — Z51.5 PALLIATIVE CARE ENCOUNTER: Status: ACTIVE | Noted: 2017-07-07

## 2017-07-07 LAB
1,25-DIHYDROXYVITAMIN D: 59.4 PG/ML
BASOPHILS # BLD AUTO: 0.02 X10(3) UL (ref 0–0.1)
BASOPHILS NFR BLD AUTO: 0.3 %
EOSINOPHIL # BLD AUTO: 0.12 X10(3) UL (ref 0–0.3)
EOSINOPHIL NFR BLD AUTO: 1.5 %
ERYTHROCYTE [DISTWIDTH] IN BLOOD BY AUTOMATED COUNT: 14.3 % (ref 11.5–16)
GLUCOSE BLD-MCNC: 193 MG/DL (ref 65–99)
GLUCOSE BLD-MCNC: 194 MG/DL (ref 65–99)
HCT VFR BLD AUTO: 30.7 % (ref 34–50)
HEMATOCRIT (CLIENT SUPPLIED): 30.2 %
HGB BLD-MCNC: 9.5 G/DL (ref 12–16)
IMMATURE GRANULOCYTE COUNT: 0.04 X10(3) UL (ref 0–1)
IMMATURE GRANULOCYTE RATIO %: 0.5 %
LYMPHOCYTES # BLD AUTO: 0.73 X10(3) UL (ref 0.9–4)
LYMPHOCYTES NFR BLD AUTO: 9.2 %
MCH RBC QN AUTO: 28.2 PG (ref 27–33.2)
MCHC RBC AUTO-ENTMCNC: 30.9 G/DL (ref 31–37)
MCV RBC AUTO: 91.1 FL (ref 81–100)
MONOCYTES # BLD AUTO: 0.71 X10(3) UL (ref 0.1–0.6)
MONOCYTES NFR BLD AUTO: 8.9 %
NEUTROPHIL ABS PRELIM: 6.32 X10 (3) UL (ref 1.3–6.7)
NEUTROPHILS # BLD AUTO: 6.32 X10(3) UL (ref 1.3–6.7)
NEUTROPHILS NFR BLD AUTO: 79.6 %
PLATELET # BLD AUTO: 278 10(3)UL (ref 150–450)
POTASSIUM SERPL-SCNC: 3.4 MMOL/L (ref 3.6–5.1)
RBC # BLD AUTO: 3.37 X10(6)UL (ref 3.8–5.1)
RED CELL DISTRIBUTION WIDTH-SD: 47.1 FL (ref 35.1–46.3)
RPR SER QL: NONREACTIVE
WBC # BLD AUTO: 7.9 X10(3) UL (ref 4–13)

## 2017-07-07 PROCEDURE — 99222 1ST HOSP IP/OBS MODERATE 55: CPT | Performed by: CLINICAL NURSE SPECIALIST

## 2017-07-07 PROCEDURE — 82962 GLUCOSE BLOOD TEST: CPT

## 2017-07-07 PROCEDURE — 71010 XR CHEST AP PORTABLE  (CPT=71010): CPT | Performed by: INTERNAL MEDICINE

## 2017-07-07 NOTE — CONSULTS
[facility]                       Gastroenterology [address] [facility] Gastroenterology    [facility] Patient Status:  Inpatient    1924 MRN [id number]   [facility] 3SW-A Attending Amadeo Ren MD   Hosp Day # 1 PCP Iraida Lux Once   dextrose 5 % and 0.9 % NaCl infusion  Intravenous Continuous   acetaminophen (TYLENOL) tab 650 mg 650 mg Oral Q6H PRN   TraMADol HCl (ULTRAM) tab 50 mg 50 mg Oral Q12H PRN   Pneumococcal Vac Polyvalent (PNEUMOVAX) injection 0.5 mL 0.5 mL Subcutaneou guarding;  No ascites is clinically apparent; no tympany to percussion; no scars noted     Ext: No peripheral edema or cyanosis    Skin: Warm and dry    Psychiatric: Calm     Labs:   Lab Results  Component Value Date   WBC 7.9 07/07/2017   HGB 9.5 07/07/201  AMS     TECHNIQUE:  Noncontrast CT scanning is performed through the brain. Dose reduction techniques were used.  Dose information is transmitted to the [facility] [place]. [address] ([address]) which includes the Dose Ind HISTORY: (As transcribed by Technologist)  Fracture 3 weeks ago of humerus.          FINDINGS:       Acute humeral neck fracture. Humeral head does not appear dislocated. The humeral shaft is displaced to anteriorly.  The bones are osteopenic limiting evalu level facet arthropathy. Foraminal narrowing is noted most severe C5-6 C6-7. Annular bulging disc and osteophyte complex noted at C3-4 through C6-7. Uncinate   arthritis is noted at all levels.  No absolute canal stenosis C2-C3 through C7-T1.            Imp consultation, we will follow the patient with you.     PATRICIA Hope  11:14 AM  7/7/2017  [facility] Gastroenterology  [phone]    Attending physician addendum:   I personally saw and examined the patient, agree with the above findings, assessment a

## 2017-07-07 NOTE — PLAN OF CARE
Pt with very little urine output. IVF running. Bladder scan completed, only 72mL noted. Dr. Zacarias Sanz notified, will change IVF to D5 0.9 and monitor.       Palliative care consult placed - per Speech, palliative care should be considered d/t inability to sw

## 2017-07-07 NOTE — PROGRESS NOTES
[facility]  Progress Note    Campbell Garzon Patient Status:  Inpatient    1924 MRN [id number]   [facility] 3SW-A Attending Pete Guerra MD   Hosp Day # 1 PCP Kulwant Hernandez MD         SUBJECTIVE:  Subjective:  Campbell Garzon is a(n 5,000 Units Subcutaneous 2 times per day   • Clopidogrel Bisulfate  75 mg Oral Daily   • docusate sodium  100 mg Oral Nightly   • Donepezil HCl  10 mg Oral Nightly   • furosemide  20 mg Oral BID (Diuretic)   • MetFORMIN HCl  500 mg Oral Daily with breakfas also advised dnr- he wants to think about it     See tests ordered,  Available and radiology reviewed  All consultant notes reviewed  Discussed with nursing on floor  dvt prophylaxis reviewed  PT and/or OT  Susan Newell MD    Addendum  I spoke with case

## 2017-07-07 NOTE — PROGRESS NOTES
[facility]  Neurosurgery Progress Note  2017    Anoop Rajan Patient Status:  Inpatient    1924 MRN [id number]   [facility] 3SW-A Attending Raji Gardner MD   Hosp Day # 1 PCP Chetna Mendoza MD     SUBJECTIVE:  Anoop Rajan 1.00 x10(3) uL   Neutrophil % 79.6 %   Lymphocyte % 9.2 %   Monocyte % 8.9 %   Eosinophil % 1.5 %   Basophil % 0.3 %   Immature Granulocyte % 0.5 %       Assessment/Plan:  81 yo female with c2 fx  Imaging stable  Collar at all times  May continue to treat

## 2017-07-07 NOTE — PALLIATIVE CARE NOTE
PCSW placed call to pts son/Jason requesting call back regarding POA paperwork and decision making; voicemail was left, currently waiting call back. APN/Norma notified.

## 2017-07-07 NOTE — CM/SW NOTE
Lito Perez with [facility] and SW met with son, Tao Morataya, at length. Son expressed that he wants pt to be fed to see if she develops any problems. Lito Perez discussed issue of doing no harm and that if rec is NPO, feeding her would expect to do harm.   Lito Perez informed son that M

## 2017-07-07 NOTE — PLAN OF CARE
Patient slept all night ,not pulling on the collar or IV ,repositioned in bed and made comfortable ,lab called with hemoglobin of 7.1 ,which is 2 gm less than yesterday ,possible dilutional ,told lab to redraw it for correction ,will follow up

## 2017-07-07 NOTE — CONSULTS
[facility]  [id number]  [facility] Day #1  Date of Consult: 07/07/17       Reason for Consultation: Consult requested for evaluation of palliative care needs and goals of care discussion.     Hist care.  Also discussed patient \"quality of life\" and code status. Son states he is uncertain how to proceed. Son states he wants to speak with attending physician prior to making any decisions. Dr. Min Po expected to meet with son within the hour.

## 2017-07-07 NOTE — PLAN OF CARE
Impaired Activities of Daily Living    • Achieve highest/safest level of independence in self care Progressing        PAIN - ADULT    • Verbalizes/displays adequate comfort level or patient's stated pain goal Progressing        Patient/Family Goals    • Pa

## 2017-07-07 NOTE — PAYOR COMM NOTE
--------------  CONTINUED STAY REVIEW    Payor: MEDICARE PART B ONLY  Subscriber #:  [id number]  Authorization Number: N/A    Admit date: 7/4/2017  1:31 PM       Admitting Physician: Pete Guerra MD  Attending Physician:   Pete Guerra MD  Primary C insulin. 4.                 History of edema.  Currently on Lasix. 5.                 Increased cholesterol.  Continue simvastatin. 6.                 Dementia.  Continue on Aricept.   7.                 Weakness.  PT/OT evaluate and treat once okay with IV,  repositioned in bed and made comfortable, lab called with hemoglobin of 7.1 ,which is 2 gm less than yesterday, possible dilutional ,told lab to redraw it for correction ,will follow up   pallative care consult         [address] Neurology      PLEASE FAX ALL INPT DAYS AS CERTIFIED ALONG W/NRD

## 2017-07-07 NOTE — SLP NOTE
Author of this note attended palliative care conference with palliative APN and pt's son. Attendance due to pt's son request to better understand dysphagia and results/recommendations from video swallow study.  Educated pt's son at length on results and rec

## 2017-07-07 NOTE — OCCUPATIONAL THERAPY NOTE
OCCUPATIONAL THERAPY TREATMENT NOTE - INPATIENT     Room Number: 351/351-A  Session: 1   Number of Visits to Meet Established Goals: 5    Presenting Problem: Neck pain, recent C2 fx 3 weeks ago    History related to current admission: Patient with fall rubén Form  How much help from another person does the patient currently need…  -   Putting on and taking off regular lower body clothing?: A Lot  -   Bathing (including washing, rinsing, drying)?: A Lot  -   Toileting, which includes using toilet, bedpan or uri patient making fair progress towards OT goals with slight improvements in balance, endurance and independence in ADL tasks, however patient remains below her baseline in these and other functional areas.  Patient would benefit from continued OT services dur

## 2017-07-07 NOTE — PHYSICAL THERAPY NOTE
PHYSICAL THERAPY TREATMENT NOTE - INPATIENT    Room Number: 351/351-A     Session: 1  Number of Visits to Meet Established Goals: 5    Presenting Problem: S/p Fall - 3 weeks ago - C2 Fx, Right proximal Humerus Fx    Problem List  Principal Problem:    Tra A Little   How much help from another person does the patient currently need. ..   -   Moving to and from a bed to a chair (including a wheelchair)?: A Little   -   Need to walk in hospital room?: A Little   -   Climbing 3-5 steps with a railing?: A Lot bed mobility, functional transfers & gait with RW. DISCHARGE RECOMMENDATIONS  PT Discharge Recommendations: Sub-acute rehabilitation (ELOS : 19 to 21 days)     PLAN  PT Treatment Plan: Bed mobility; Body mechanics; Don/doff brace; Endurance; Energy conserva

## 2017-07-07 NOTE — CM/SW NOTE
Informed by RN during d/c rounds that pt has order for Palliative Care consult for goals of care. Pt failed swallow study- PEG vs palliative/hospice recommended by Speech therapy.     Spoke with Kirill Briceno- he met with pt's son at length to discuss code stat

## 2017-07-08 LAB
ALBUMIN SERPL-MCNC: 2.3 G/DL (ref 3.5–4.8)
ALP LIVER SERPL-CCNC: 96 U/L (ref 55–142)
ALT SERPL-CCNC: 11 U/L (ref 14–54)
AST SERPL-CCNC: 12 U/L (ref 15–41)
BASOPHILS # BLD AUTO: 0.03 X10(3) UL (ref 0–0.1)
BASOPHILS NFR BLD AUTO: 0.4 %
BILIRUB SERPL-MCNC: 0.9 MG/DL (ref 0.1–2)
BUN BLD-MCNC: 11 MG/DL (ref 8–20)
CALCIUM BLD-MCNC: 9.2 MG/DL (ref 8.3–10.3)
CHLORIDE: 109 MMOL/L (ref 101–111)
CO2: 27 MMOL/L (ref 22–32)
CREAT BLD-MCNC: 0.64 MG/DL (ref 0.55–1.02)
EOSINOPHIL # BLD AUTO: 0.09 X10(3) UL (ref 0–0.3)
EOSINOPHIL NFR BLD AUTO: 1.1 %
ERYTHROCYTE [DISTWIDTH] IN BLOOD BY AUTOMATED COUNT: 14.3 % (ref 11.5–16)
GLUCOSE BLD-MCNC: 179 MG/DL (ref 65–99)
GLUCOSE BLD-MCNC: 194 MG/DL (ref 65–99)
GLUCOSE BLD-MCNC: 212 MG/DL (ref 70–99)
GLUCOSE BLD-MCNC: 214 MG/DL (ref 65–99)
GLUCOSE BLD-MCNC: 226 MG/DL (ref 65–99)
HCT VFR BLD AUTO: 30.9 % (ref 34–50)
HGB BLD-MCNC: 9.5 G/DL (ref 12–16)
IMMATURE GRANULOCYTE COUNT: 0.11 X10(3) UL (ref 0–1)
IMMATURE GRANULOCYTE RATIO %: 1.4 %
LYMPHOCYTES # BLD AUTO: 0.55 X10(3) UL (ref 0.9–4)
LYMPHOCYTES NFR BLD AUTO: 7 %
M PROTEIN MFR SERPL ELPH: 5.5 G/DL (ref 6.1–8.3)
MCH RBC QN AUTO: 27.8 PG (ref 27–33.2)
MCHC RBC AUTO-ENTMCNC: 30.7 G/DL (ref 31–37)
MCV RBC AUTO: 90.4 FL (ref 81–100)
MONOCYTES # BLD AUTO: 0.71 X10(3) UL (ref 0.1–0.6)
MONOCYTES NFR BLD AUTO: 9.1 %
NEUTROPHIL ABS PRELIM: 6.35 X10 (3) UL (ref 1.3–6.7)
NEUTROPHILS # BLD AUTO: 6.35 X10(3) UL (ref 1.3–6.7)
NEUTROPHILS NFR BLD AUTO: 81 %
PLATELET # BLD AUTO: 270 10(3)UL (ref 150–450)
POTASSIUM SERPL-SCNC: 3.7 MMOL/L (ref 3.6–5.1)
RBC # BLD AUTO: 3.42 X10(6)UL (ref 3.8–5.1)
RED CELL DISTRIBUTION WIDTH-SD: 46.7 FL (ref 35.1–46.3)
SODIUM SERPL-SCNC: 142 MMOL/L (ref 136–144)
VITAMIN B1 (THIAMINE), WHOLE B: 61 NMOL/L
WBC # BLD AUTO: 7.8 X10(3) UL (ref 4–13)

## 2017-07-08 PROCEDURE — 82962 GLUCOSE BLOOD TEST: CPT

## 2017-07-08 RX ORDER — POTASSIUM CHLORIDE 14.9 MG/ML
20 INJECTION INTRAVENOUS ONCE
Status: COMPLETED | OUTPATIENT
Start: 2017-07-08 | End: 2017-07-08

## 2017-07-08 RX ORDER — ACETAMINOPHEN 325 MG/1
650 TABLET ORAL EVERY 6 HOURS PRN
Qty: 30 TABLET | Refills: 0 | Status: SHIPPED | OUTPATIENT
Start: 2017-07-08

## 2017-07-08 RX ORDER — FOLIC ACID 1 MG/1
1 TABLET ORAL DAILY
Status: DISCONTINUED | OUTPATIENT
Start: 2017-07-08 | End: 2017-07-22

## 2017-07-08 RX ORDER — MELATONIN
100 DAILY
Qty: 30 TABLET | Refills: 0 | Status: SHIPPED | OUTPATIENT
Start: 2017-07-08 | End: 2018-03-01

## 2017-07-08 RX ORDER — MELATONIN
100 DAILY
Status: DISCONTINUED | OUTPATIENT
Start: 2017-07-08 | End: 2017-07-22

## 2017-07-08 RX ORDER — FOLIC ACID 1 MG/1
1 TABLET ORAL DAILY
Qty: 30 TABLET | Refills: 0 | Status: SHIPPED | OUTPATIENT
Start: 2017-07-08

## 2017-07-08 NOTE — PROGRESS NOTES
[facility]  Neurosurgery Progress Note    Allyssa Solano Patient Status:  Inpatient    1924 MRN [id number]   [facility] 3SW-A Attending Peace Busby MD   Hosp Day # 2 PCP Jazzmine Obrien MD       Subjective:  Allyssa Solano is a(n) of the canal at this level is 1.06 cm. The dimension of the foramen magnum   is 1.6 cm. On the lateral views, there is posterior subluxation of the lateral masses of C1 in relationship to C2. Assessment:  1. Traumatic Unstable C2 Fracture  2.  Neck pain

## 2017-07-08 NOTE — PLAN OF CARE
Blood sugars under 200 without taking metformin due to NPO status. Pt speaks [place], able to follow some commands, pt able to squeeze my hand and move feet. Has generalized weakness.  Pt NPO, speech eval to reordered to reevaluate pt now that she is off na

## 2017-07-08 NOTE — PROGRESS NOTES
I was asked to speak with the patient's son, Cynthia Christopher regarding a re-discussion about decision for or against a PEG tube. Jason expressed to me a strong reluctance to proceed with g-tube placement, and asked my opinion.   I explained that at this patient's age placed in Hospice. I explained that a decision to feed orally, should not preclude this.   However, I did explain that depending on her ability to eat orally, if she is not able to receive adequate energy intake to maintain her energy needs, that this may

## 2017-07-08 NOTE — PHYSICAL THERAPY NOTE
PHYSICAL THERAPY TREATMENT NOTE - INPATIENT    Room Number: 351/351-A     Session: 2  Number of Visits to Meet Established Goals: 5    Presenting Problem: S/p Fall - 3 weeks ago - C2 Fx, Right proximal Humerus Fx    Problem List  Principal Problem:    Tra a chair with arms (e.g., wheelchair, bedside commode, etc.): A Little   -   Moving from lying on back to sitting on the side of the bed?: A Little   How much help from another person does the patient currently need. ..   -   Moving to and from a bed to a ch with goal of achieving supervision level of bed mobility, functional transfers & gait with RW. DISCHARGE RECOMMENDATIONS  PT Discharge Recommendations: Sub-acute rehabilitation (ELOS : 19 to 21 days)     PLAN  PT Treatment Plan: Bed mobility; Body mechan

## 2017-07-09 LAB
GLUCOSE BLD-MCNC: 170 MG/DL (ref 65–99)
GLUCOSE BLD-MCNC: 172 MG/DL (ref 65–99)
GLUCOSE BLD-MCNC: 185 MG/DL (ref 65–99)
GLUCOSE BLD-MCNC: 211 MG/DL (ref 65–99)
HAV IGM SER QL: 1.6 MG/DL (ref 1.7–3)
POTASSIUM SERPL-SCNC: 3 MMOL/L (ref 3.6–5.1)
POTASSIUM SERPL-SCNC: 3.9 MMOL/L (ref 3.6–5.1)

## 2017-07-09 PROCEDURE — 82962 GLUCOSE BLOOD TEST: CPT

## 2017-07-09 RX ORDER — DEXTROSE MONOHYDRATE 25 G/50ML
50 INJECTION, SOLUTION INTRAVENOUS
Status: DISCONTINUED | OUTPATIENT
Start: 2017-07-09 | End: 2017-07-22

## 2017-07-09 RX ORDER — POTASSIUM CHLORIDE 14.9 MG/ML
20 INJECTION INTRAVENOUS ONCE
Status: COMPLETED | OUTPATIENT
Start: 2017-07-09 | End: 2017-07-09

## 2017-07-09 NOTE — SLP NOTE
Orders received for consult for swallow re-evaluation \"now that pt has had no narcotics\" per order. It would be in this patient's best interest and safety if patient undergoes a repeat VFSS on Monday am with suction present. Will order VFSS.   Lula onofre

## 2017-07-09 NOTE — PLAN OF CARE
Impaired Swallowing    • Minimize aspiration risk Not Progressing          Diabetes/Glucose Control    • Glucose maintained within prescribed range Progressing        Impaired Activities of Daily Living    • Achieve highest/safest level of independence in

## 2017-07-09 NOTE — PROGRESS NOTES
[facility] Progress Note        Anjelica Silveira Patient Status:  Inpatient    1924 MRN [id number]   [facility] 3SW-A Attending Jl Pearson MD   Hosp Day # 3 PCP Robi Huitron MD     Subjective:  Pt awake; no fever, chest pain, --    BUN   --   11   --    CREATSERUM   --   0.64   --    CA   --   9.2   --    MG   --    --   1.6*   GLU   --   212*   --        Recent Labs   Lab  07/08/17   0527   ALT  11*   AST  12*   ALB  2.3*       No results for input(s): PT, INR in the last 72 h

## 2017-07-10 ENCOUNTER — APPOINTMENT (OUTPATIENT)
Dept: GENERAL RADIOLOGY | Facility: HOSPITAL | Age: 82
DRG: 552 | End: 2017-07-10
Attending: SPECIALIST
Payer: MEDICARE

## 2017-07-10 LAB
A/G RATIO: 1.21
ALBUMIN, SERUM: 3.23 G/DL (ref 3.5–4.8)
ALPHA-1 GLOBULIN: 0.22 G/DL (ref 0.1–0.3)
ALPHA-2 GLOBULIN: 0.82 G/DL (ref 0.6–1)
BETA GLOBULIN: 0.57 G/DL (ref 0.7–1.2)
GAMMA GLOBULIN: 1.06 G/DL (ref 0.6–1.6)
GLUCOSE BLD-MCNC: 126 MG/DL (ref 65–99)
GLUCOSE BLD-MCNC: 172 MG/DL (ref 65–99)
GLUCOSE BLD-MCNC: 184 MG/DL (ref 65–99)
GLUCOSE BLD-MCNC: 223 MG/DL (ref 65–99)
HAV IGM SER QL: 2.2 MG/DL (ref 1.7–3)
KAPPA FREE LIGHT CHAIN: 3.24 MG/DL (ref 0.33–1.94)
KAPPA/LAMBDA FLC RATIO: 1.8 (ref 0.26–1.65)
LAMBDA FREE LIGHT CHAIN: 1.8 MG/DL (ref 0.57–2.63)
TOTAL PROTEIN,SERUM: 5.9 G/DL (ref 6.1–8.3)

## 2017-07-10 PROCEDURE — 74230 X-RAY XM SWLNG FUNCJ C+: CPT | Performed by: SPECIALIST

## 2017-07-10 PROCEDURE — 82962 GLUCOSE BLOOD TEST: CPT

## 2017-07-10 NOTE — CM/SW NOTE
CARMELITA informed by RN that pt failed swallow eval today. Speech asking about TPN or NG tube feeding. Apparently PEG tube not recommended by GI.    CARMELITA spoke with Mariposa Schmid with [facility].   She states that both options are meant to be short term and if pt has to wear col

## 2017-07-10 NOTE — PROGRESS NOTES
[address] Neurosurgery Progress Note    James Pang Patient Status:  Inpatient    1924 MRN [id number]   [facility] 3SW-A Attending Roger Ramírez MD   Hosp Day # 4 PCP Sondra Darling MD     Subjective:  James Pang is a(n staff.    Exam: Sitting up in bed collar intact, speaks only [place]ralt[place]. Responds to visual prompting for motor examination hich is intact. Plan follow up with Dr. Va Manrique in one month. Kate Thornton.  Jessica Albright, [id number] [facility]  Neurological S

## 2017-07-10 NOTE — PROGRESS NOTES
[facility]  Progress Note    Junior Houston Patient Status:  Inpatient    1924 MRN [id number]   [facility] 3SW-A Attending Markell Walker MD   Hosp Day # 4 PCP Jace Peck MD         SUBJECTIVE:  Subjective:  Junior Houston is a(n input(s): URINE, CULTI, BLDSMR in the last 72 hours.             Meds:     • insulin aspart  1-5 Units Subcutaneous TID CC and HS   • folic acid  1 mg Oral Daily   • Thiamine HCl  100 mg Oral Daily   • Calcium Carbonate-Vitamin D  1 tablet Oral BID with dwayne makes decision    See tests ordered,  Available and radiology reviewed  All consultant notes reviewed  Discussed with nursing on floor  dvt prophylaxis reviewed  PT and/or OT  Taiwo Olguin MD

## 2017-07-10 NOTE — PROGRESS NOTES
[facility]  Progress Note    Estevan Lunsford Patient Status:  Inpatient    1924 MRN [id number]   [facility] 3SW-A Attending Minna Meza MD   Hosp Day # 4 PCP Mahin Evans MD         SUBJECTIVE:  Subjective:  Estevan Lunsford is a(n results for input(s): URINE, CULTI, BLDSMR in the last 72 hours.             Meds:     • insulin aspart  1-5 Units Subcutaneous TID CC and HS   • folic acid  1 mg Oral Daily   • Thiamine HCl  100 mg Oral Daily   • Calcium Carbonate-Vitamin D  1 tablet Oral FAILURE TO THRIVE, LEADING TO, OR MAY LEAD TO RECURRENT HOSPITALIZATIONS, EACH ONE CLOSER TO AND LONGER THAN THE ONE BEFORE, DUE TO SOME CHRONIC IRREVERSIBLE, INSOLVABLE MEDICAL ISSUES AND AT THIS POINT FAMILY WILL THINK ABOUT COMFORT CARE AND/ OR HOSPICE

## 2017-07-10 NOTE — PALLIATIVE CARE NOTE
1808 Sami Gutierrez Follow Up      [place]  [id number]       Spoke with JUAN Lisa. Pt failed swallow study. Per GI notes pt/family declined PEG tube. Spoke with son Isaac Schwartz by phone.   He requests a meeting at [facility] tomorrow to discus

## 2017-07-10 NOTE — PROGRESS NOTES
Spoke with Dr. Anson Walsh regarding starting TPN vs NG tube feedings. States intervention unwarranted but ok with re-consulting GI if needed.

## 2017-07-10 NOTE — OCCUPATIONAL THERAPY NOTE
Attempted to see patient for OT treatment session, however patient being prepared to go off floor for video swallow testing. Discussed plan of care with family and patient. Will follow-up as time permits.

## 2017-07-10 NOTE — VIDEO SWALLOW STUDY NOTE
ADULT VIDEOFLUOROSCOPIC SWALLOWING STUDY    Admission Date: 7/4/2017  Evaluation Date: 07/10/17  Radiologist: Christine Urrutia MD     PLAN   Diet Recommendations - Solids:  (NPO)  Diet Recommendations - Liquid:  (NPO)   Initiate alternative feeding route- p present in response to the aspirant. Laryngeal penetration occurred with puree texture into the superior laryngeal vestibule. No cough in response to penetration.  Swallow response initiated at the level of the pyriform sinuses on the initial swallow and Partial visualization of a right proximal humeral fracture. Dictated by: Ofelia Velazquez MD on 7/07/2017 at 15:23          CT of Cervical Spine:   Impression   CONCLUSION:   #1.  There is a transverse fracture involving the base of the odontoid proc 6/14/2017 10:45 AM        Indication: Dysphagia        Technique:  A fluoroscopic video swallow examination was performed in conjunction with speech pathology. Multiple consistencies of barium were administered under fluoroscopy.  Total fluoroscopy time epiglottic inversion;pharyngeal wall residue;vallecular residue;moderate;mild;repeated swallows   Thin Liquid, Rosenbek's Scale 1-->no aspiration, contrast does not enter airway   Thin Liquid, Response to Pharyngeal Residue (Vfss) unable to clear with mult swallows   Nectar, Response to Aspiration unproductive reflexive involuntary cough  (delayed)   Nectar Thick, Epiglottic Movement Pattern (Vfss) (reduced retroversion)   Nectar Thick, Identified Consequences of Impaired Swallow Physiology (Vfss) misdirecti silent aspiration   Honey Thick, Epiglottic Movement Pattern (Vfss) (reduced retroversion)   Honey Thick, Identified Consequences of Impaired Swallow Physiology (Vfss) bolus residue in pharynx;aspiration;bolus residue in oral cavity   Honey, Attempted Comp vallecula, decreased base of tongue retraction, PPW contraction , hyolaryngeal elevation , epiglottic inversion with moderate-severe vallecular , mild PPW and moderate pyriform sinus residue which pt was unable to clear with multiple swallows   VFSS Impres short term alternate route of nutrition ( DHT) and dysphagia treatment with controlled trials of puree with Speech Pathology only. Results of study and recommendations discussed via phone with Terrea Hodgkin, son, who verbalized understanding.    Short term (cervical collar in place). Patient Viewed: Lateral.  Patient Alertness: Fully alert. Consistencies Presented: Nectar thick liquids; Honey thick liquids;Puree to assess oropharyngeal swallow function and assess for compensatory strategies to improve safe passes below the vocal folds, and no effort is made to eject         Overall Impression:  2nd VFSS performed today per MD request now that patient has no narcotics. Daughter-in-law present to assist with interpreting.  Patient was presented nectar-thick, ho strategies assessed were multiple repeat swallows and effortful swallows which were partially effective in reducing pharyngeal residuals, however, did not prevent spillover aspiration and large amount of penetration.     Current dysphagia may be multifactor Goal #4 Patient will perform Janis Maneuver for improved strengthening of of tongue base to promote improve pharyngeal pressures with 80% acc with max cues   Goal #5 Patient will perform resistive tongue exercises for improved strength with 80% acc with

## 2017-07-10 NOTE — PLAN OF CARE
Problem: Impaired Swallowing  Goal: Minimize aspiration risk  NPO  TRIAL dysphagia ex program.  TRIAL therapeutic feeding of puree by SLP department only.       Outcome: Progressing

## 2017-07-10 NOTE — PHYSICAL THERAPY NOTE
Attempted to see pt for PT session. However, pt is at swallow study and out of her room. Will try later as time permits.

## 2017-07-11 LAB
ALBUMIN SERPL-MCNC: 2.3 G/DL (ref 3.5–4.8)
ALP LIVER SERPL-CCNC: 103 U/L (ref 55–142)
ALT SERPL-CCNC: 16 U/L (ref 14–54)
AST SERPL-CCNC: 21 U/L (ref 15–41)
BASOPHILS # BLD AUTO: 0.03 X10(3) UL (ref 0–0.1)
BASOPHILS NFR BLD AUTO: 0.5 %
BILIRUB SERPL-MCNC: 0.8 MG/DL (ref 0.1–2)
BUN BLD-MCNC: 8 MG/DL (ref 8–20)
CALCIUM BLD-MCNC: 9.6 MG/DL (ref 8.3–10.3)
CHLORIDE: 110 MMOL/L (ref 101–111)
CO2: 26 MMOL/L (ref 22–32)
CREAT BLD-MCNC: 0.67 MG/DL (ref 0.55–1.02)
EOSINOPHIL # BLD AUTO: 0.1 X10(3) UL (ref 0–0.3)
EOSINOPHIL NFR BLD AUTO: 1.7 %
ERYTHROCYTE [DISTWIDTH] IN BLOOD BY AUTOMATED COUNT: 14.6 % (ref 11.5–16)
GLUCOSE BLD-MCNC: 111 MG/DL (ref 65–99)
GLUCOSE BLD-MCNC: 125 MG/DL (ref 65–99)
GLUCOSE BLD-MCNC: 141 MG/DL (ref 65–99)
GLUCOSE BLD-MCNC: 147 MG/DL (ref 70–99)
GLUCOSE BLD-MCNC: 165 MG/DL (ref 65–99)
HCT VFR BLD AUTO: 33 % (ref 34–50)
HGB BLD-MCNC: 9.7 G/DL (ref 12–16)
IMMATURE GRANULOCYTE COUNT: 0.06 X10(3) UL (ref 0–1)
IMMATURE GRANULOCYTE RATIO %: 1 %
LYMPHOCYTES # BLD AUTO: 0.82 X10(3) UL (ref 0.9–4)
LYMPHOCYTES NFR BLD AUTO: 13.6 %
M PROTEIN MFR SERPL ELPH: 5.6 G/DL (ref 6.1–8.3)
MCH RBC QN AUTO: 27.6 PG (ref 27–33.2)
MCHC RBC AUTO-ENTMCNC: 29.4 G/DL (ref 31–37)
MCV RBC AUTO: 94 FL (ref 81–100)
MONOCYTES # BLD AUTO: 0.68 X10(3) UL (ref 0.1–0.6)
MONOCYTES NFR BLD AUTO: 11.3 %
NEUTROPHIL ABS PRELIM: 4.33 X10 (3) UL (ref 1.3–6.7)
NEUTROPHILS # BLD AUTO: 4.33 X10(3) UL (ref 1.3–6.7)
NEUTROPHILS NFR BLD AUTO: 71.9 %
PLATELET # BLD AUTO: 258 10(3)UL (ref 150–450)
POTASSIUM SERPL-SCNC: 3.5 MMOL/L (ref 3.6–5.1)
RBC # BLD AUTO: 3.51 X10(6)UL (ref 3.8–5.1)
RED CELL DISTRIBUTION WIDTH-SD: 50.4 FL (ref 35.1–46.3)
SODIUM SERPL-SCNC: 141 MMOL/L (ref 136–144)
WBC # BLD AUTO: 6 X10(3) UL (ref 4–13)

## 2017-07-11 PROCEDURE — 82962 GLUCOSE BLOOD TEST: CPT

## 2017-07-11 PROCEDURE — 99497 ADVNCD CARE PLAN 30 MIN: CPT | Performed by: NURSE PRACTITIONER

## 2017-07-11 PROCEDURE — 99231 SBSQ HOSP IP/OBS SF/LOW 25: CPT | Performed by: NURSE PRACTITIONER

## 2017-07-11 NOTE — SLP NOTE
Arrived to see pt this afternoon. Pts family currently meeting with Liane Ramirez Palliative Care NP re: options for care.     Pt has \"failed\" multiple VFSS ([facility] and [facility])  Most recent VFSS completed 7/10/17 (completed per MD request wi assessed were multiple repeat swallows and effortful swallows which were partially effective in reducing pharyngeal residuals, however, did not prevent spillover aspiration and large amount of penetration.      Current dysphagia may be multifactorial- Patie

## 2017-07-11 NOTE — PROGRESS NOTES
I was asked by Dr. Briseyda Payne to comment on the appropriateness of TPN  In this patient. I expressed to Dr. Briseyda Payne that TPN would not be indicated in this patient.   I would not provide any true nutritional benefit in this 79 yo woman who has multiple medical co

## 2017-07-11 NOTE — PALLIATIVE CARE NOTE
1808 Sami Gutierrez Follow Up      [facility]  [id number]       Patient seen and evaluated, family at bedside. Pt is awake and alert. Pt deferred goals of care conversation to her son.     Assessment/Recommendations:  Prognosis/Goals Shadi, [facility] Kennedy Cody NP  Pager [phone], Phone [phone]  7/11/2017  4:14 PM

## 2017-07-11 NOTE — PHYSICAL THERAPY NOTE
PHYSICAL THERAPY TREATMENT NOTE - INPATIENT    Room Number: 351/351-A     Session: 3  Number of Visits to Meet Established Goals: 5    Presenting Problem: S/p Fall - 3 weeks ago - C2 Fx, Right proximal Humerus Fx    Problem List  Principal Problem:    Tra bed?: A Little   How much help from another person does the patient currently need. ..   -   Moving to and from a bed to a chair (including a wheelchair)?: A Little   -   Need to walk in hospital room?: A Little   -   Climbing 3-5 steps with a railing?: A L to maximize functional mobility with goal of achieving supervision level of bed mobility, functional transfers & gait with RW. DISCHARGE RECOMMENDATIONS  PT Discharge Recommendations: Sub-acute rehabilitation     PLAN  PT Treatment Plan: Bed mobility; Reed Kwon

## 2017-07-11 NOTE — CONSULTS
[facility] Patient Status:  Inpatient    1924 MRN [id number]   [facility] 3SW-A Attending Jam Robles MD   Hosp Day # 5 PCP Dorothy Casey MD     Patient Identification  Faby Wells is a 80year old female. 0.9 % 250 mL IVPB 40 mEq Intravenous Once   glucose (DEX4) oral liquid 15 g 15 g Oral Q15 Min PRN   Or      Glucose-Vitamin C (DEX-4) 4-0.006 g chewable tab 4 tablet 4 tablet Oral Q15 Min PRN   Or      dextrose 50% injection 50 mL 50 mL Intravenous Q15 Min nystatin-triamcinolone (MYCOLOG II) cream  Topical QID   atorvastatin (LIPITOR) tab 10 mg 10 mg Oral Nightly          Smoking status: Unknown If Ever Smoked    Smokeless tobacco: Not on file    Alcohol use Not on file       No family history on file. guarding or rigidity. Liver and spleen are not enlarged. Bowel sounds present. Musculoskeletal:     Right Upper Extremity:  Strength is 4 distally and 1 proximally. ROM WNL. Left Upper Extremity:  Strength is 4. ROM WNL.    Right Saeed Race consult. MD [facility].

## 2017-07-11 NOTE — DIETARY NOTE
NUTRITION INITIAL ASSESSMENT    Pt is at high nutrition risk. Pt does not meet malnutrition criteria. NUTRITION DIAGNOSIS/PROBLEM:    Inadequate oral intake related to swallowing difficulty as evidenced by pt failed swallow study, pt NPO x 4 days.     NU kg)  Protein: 51-64 grams protein/day (1.2-1.5 grams protein per kg)  Fluid: ~1 ml/kcal or per MD discretion    MONITOR AND EVALUATE/NUTRITION GOALS:  1. Pt to meet 100% of nutritional needs via most appropriate route  3. No signs of skin breakdown  4.  Terrance Garcia

## 2017-07-11 NOTE — OCCUPATIONAL THERAPY NOTE
Attempted to see pt for OT. Family not present at this time for translation, per chart review pt unable to communicate via IPAD/phone  due to hard of hearing. Discussed in person  with charge RN.    Per RN family now present for transl

## 2017-07-11 NOTE — PROGRESS NOTES
[facility]  Progress Note    Emilee Mccurdy Patient Status:  Inpatient    1924 MRN [id number]   [facility] 3SW-A Attending Esau Williamson MD   Hosp Day # 5 PCP Pati Heller MD         SUBJECTIVE:  Subjective:  Emilee Mccurdy is a(n 141*       No results for input(s): URINE, CULTI, BLDSMR in the last 72 hours.             Meds:     • insulin aspart  1-5 Units Subcutaneous TID CC and HS   • folic acid  1 mg Oral Daily   • Thiamine HCl  100 mg Oral Daily   • Calcium Carbonate-Vitamin D palliative care once son makes decision- I have had a long d/w son and all questions were answered    I also had a long d/w gi on gloor- dr Fany Desouza today and we discussed nutrition options- unfortunately- tpn is not indicated due to no reduction in aspirati

## 2017-07-11 NOTE — CM/SW NOTE
SW contacted by son- he states that [address] [place] [address]  told him that pt had made some improvement in swallowing and has potential to make more progress with the right Speech therapy. He notes that [facility] cannot provide this.   Son states that he made some calls -Sonny to

## 2017-07-12 LAB
ALBUMIN SERPL-MCNC: 2.1 G/DL (ref 3.5–4.8)
ALP LIVER SERPL-CCNC: 105 U/L (ref 55–142)
ALT SERPL-CCNC: 19 U/L (ref 14–54)
AST SERPL-CCNC: 21 U/L (ref 15–41)
BASOPHILS # BLD AUTO: 0.03 X10(3) UL (ref 0–0.1)
BASOPHILS NFR BLD AUTO: 0.5 %
BILIRUB SERPL-MCNC: 0.7 MG/DL (ref 0.1–2)
BUN BLD-MCNC: 8 MG/DL (ref 8–20)
CALCIUM BLD-MCNC: 9.6 MG/DL (ref 8.3–10.3)
CHLORIDE: 111 MMOL/L (ref 101–111)
CO2: 22 MMOL/L (ref 22–32)
CREAT BLD-MCNC: 0.66 MG/DL (ref 0.55–1.02)
EOSINOPHIL # BLD AUTO: 0.09 X10(3) UL (ref 0–0.3)
EOSINOPHIL NFR BLD AUTO: 1.4 %
ERYTHROCYTE [DISTWIDTH] IN BLOOD BY AUTOMATED COUNT: 14.8 % (ref 11.5–16)
GLUCOSE BLD-MCNC: 158 MG/DL (ref 65–99)
GLUCOSE BLD-MCNC: 165 MG/DL (ref 65–99)
GLUCOSE BLD-MCNC: 172 MG/DL (ref 65–99)
GLUCOSE BLD-MCNC: 176 MG/DL (ref 65–99)
GLUCOSE BLD-MCNC: 179 MG/DL (ref 70–99)
GLUCOSE BLD-MCNC: 181 MG/DL (ref 65–99)
HCT VFR BLD AUTO: 34.3 % (ref 34–50)
HGB BLD-MCNC: 10.4 G/DL (ref 12–16)
IMMATURE GRANULOCYTE COUNT: 0.06 X10(3) UL (ref 0–1)
IMMATURE GRANULOCYTE RATIO %: 0.9 %
LYMPHOCYTES # BLD AUTO: 0.87 X10(3) UL (ref 0.9–4)
LYMPHOCYTES NFR BLD AUTO: 13.7 %
M PROTEIN MFR SERPL ELPH: 5.7 G/DL (ref 6.1–8.3)
MCH RBC QN AUTO: 28 PG (ref 27–33.2)
MCHC RBC AUTO-ENTMCNC: 30.3 G/DL (ref 31–37)
MCV RBC AUTO: 92.5 FL (ref 81–100)
MONOCYTES # BLD AUTO: 0.64 X10(3) UL (ref 0.1–0.6)
MONOCYTES NFR BLD AUTO: 10.1 %
NEUTROPHIL ABS PRELIM: 4.67 X10 (3) UL (ref 1.3–6.7)
NEUTROPHILS # BLD AUTO: 4.67 X10(3) UL (ref 1.3–6.7)
NEUTROPHILS NFR BLD AUTO: 73.4 %
PLATELET # BLD AUTO: 179 10(3)UL (ref 150–450)
POTASSIUM SERPL-SCNC: 4.1 MMOL/L (ref 3.6–5.1)
RBC # BLD AUTO: 3.71 X10(6)UL (ref 3.8–5.1)
RED CELL DISTRIBUTION WIDTH-SD: 49.1 FL (ref 35.1–46.3)
SODIUM SERPL-SCNC: 140 MMOL/L (ref 136–144)
WBC # BLD AUTO: 6.4 X10(3) UL (ref 4–13)

## 2017-07-12 PROCEDURE — 82962 GLUCOSE BLOOD TEST: CPT

## 2017-07-12 PROCEDURE — 99233 SBSQ HOSP IP/OBS HIGH 50: CPT | Performed by: NURSE PRACTITIONER

## 2017-07-12 NOTE — OCCUPATIONAL THERAPY NOTE
Attempted to see pt this PM.  No family present for translation.  Pt unable to use  line due to hard of hearing. -Lisa Aranda, MOT, OTR/L

## 2017-07-12 NOTE — SLP NOTE
Attempted to see pt for speech therapy services. Pt's family not at bedside to provide translation. Per RN, pt is unable to use  services due to [facility]. Will follow up as scheduling permits. Thank you.     Raji Pena M.S. [id number] [facility]  Pager [phone]

## 2017-07-12 NOTE — CM/SW NOTE
Per Carson GOMEZ for Palliative Care, she has arranged care conference for 9am tomorrow. I spoke with charge nurse Robert Chowdary who we try and arrange for in-person [facility] interpretor with nursing supervisor.     Dr. Laurence Clarke will attend conference and per Carson GOMEZ, so

## 2017-07-12 NOTE — PALLIATIVE CARE NOTE
1808 Sami Gutierrez Follow Up      [place]  [id number]       Patient seen and evaluated, no family at bedside. Pt is awake and alert, asking for water. Language line utilized for [place] translation.   Pt denies complaints except t

## 2017-07-12 NOTE — PROGRESS NOTES
[facility]  Progress Note    Cherie Sepulveda Patient Status:  Inpatient    1924 MRN [id number]   [facility] 3SW-A Attending Chapin Duncan MD   Hosp Day # 6 PCP Martin Mcmanus MD         SUBJECTIVE:  Subjective:  Cherie Sepulveda is a(n 07/12/17   0826   PGLU  141*  165*  165*  111*  125*  172*       No results for input(s): URINE, CULTI, BLDSMR in the last 72 hours.             Meds:     • Insulin Aspart Pen  1-5 Units Subcutaneous TID CC and HS   • folic acid  1 mg Oral Daily   • Thiamin defer to Dr. Diego Del Rosario. Right arm fracture.  -dr Rdz Sports consult. - states- stable healing- he recommends-wbat                Type 2 diabetes. Hold metformin, sliding scale insulin. History of edema. Currently on Lasix.

## 2017-07-12 NOTE — PROGRESS NOTES
SUBJECTIVE:  Difficulty with gait. Difficulty swallowing  CC:   No pain complaints today  Interval History:  Last physical therapy yesterday. No OT for a few days. She remains n.p.o.   At skilled nursing, the son had signed a waiver and she is tolerating pedis pulses normal bilat. ABDOMEN: normal Breath Sounds, no masses, no Hepatosplenomegaly.   MUSCULOSKELETAL:normal  strength is 4 in all 4 limbs except for fractured arm  NEURO: Cranial Nerves 2-12 intact; normal sensory light touch, proprioception; DTR

## 2017-07-12 NOTE — CM/SW NOTE
SW noted PMR indicating subacute rehab or home with 24hr care/HHC. Referral sent via [facility] to [place] (formerly [facility]). Received call from [address]. She indicates that subacute level would be more appropriate for pt.     Received

## 2017-07-13 LAB
BASOPHILS # BLD AUTO: 0.04 X10(3) UL (ref 0–0.1)
BASOPHILS NFR BLD AUTO: 0.6 %
EOSINOPHIL # BLD AUTO: 0.11 X10(3) UL (ref 0–0.3)
EOSINOPHIL NFR BLD AUTO: 1.5 %
ERYTHROCYTE [DISTWIDTH] IN BLOOD BY AUTOMATED COUNT: 14.6 % (ref 11.5–16)
GLUCOSE BLD-MCNC: 157 MG/DL (ref 65–99)
GLUCOSE BLD-MCNC: 188 MG/DL (ref 65–99)
GLUCOSE BLD-MCNC: 196 MG/DL (ref 65–99)
GLUCOSE BLD-MCNC: 207 MG/DL (ref 65–99)
HCT VFR BLD AUTO: 32.4 % (ref 34–50)
HGB BLD-MCNC: 10 G/DL (ref 12–16)
IMMATURE GRANULOCYTE COUNT: 0.06 X10(3) UL (ref 0–1)
IMMATURE GRANULOCYTE RATIO %: 0.8 %
LYMPHOCYTES # BLD AUTO: 0.83 X10(3) UL (ref 0.9–4)
LYMPHOCYTES NFR BLD AUTO: 11.4 %
MCH RBC QN AUTO: 27.9 PG (ref 27–33.2)
MCHC RBC AUTO-ENTMCNC: 30.9 G/DL (ref 31–37)
MCV RBC AUTO: 90.3 FL (ref 81–100)
MONOCYTES # BLD AUTO: 0.82 X10(3) UL (ref 0.1–0.6)
MONOCYTES NFR BLD AUTO: 11.3 %
NEUTROPHIL ABS PRELIM: 5.41 X10 (3) UL (ref 1.3–6.7)
NEUTROPHILS # BLD AUTO: 5.41 X10(3) UL (ref 1.3–6.7)
NEUTROPHILS NFR BLD AUTO: 74.4 %
PLATELET # BLD AUTO: 263 10(3)UL (ref 150–450)
RBC # BLD AUTO: 3.59 X10(6)UL (ref 3.8–5.1)
RED CELL DISTRIBUTION WIDTH-SD: 47.7 FL (ref 35.1–46.3)
WBC # BLD AUTO: 7.3 X10(3) UL (ref 4–13)

## 2017-07-13 PROCEDURE — 82962 GLUCOSE BLOOD TEST: CPT

## 2017-07-13 PROCEDURE — 99233 SBSQ HOSP IP/OBS HIGH 50: CPT | Performed by: NURSE PRACTITIONER

## 2017-07-13 NOTE — PLAN OF CARE
Problem: Impaired Swallowing  Goal: Minimize aspiration risk  NPO  TRIAL dysphagia ex program.  TRIAL therapeutic feeding of puree by SLP department only.       Outcome: Not Progressing

## 2017-07-13 NOTE — PHYSICAL THERAPY NOTE
PHYSICAL THERAPY TREATMENT NOTE - INPATIENT    Room Number: 351/351-A     Session: 4  Number of Visits to Meet Established Goals: 5    Presenting Problem: S/p Fall - 3 weeks ago - C2 Fx, Right proximal Humerus Fx    Problem List  Principal Problem:    Tra of the bed?: A Little   How much help from another person does the patient currently need. ..   -   Moving to and from a bed to a chair (including a wheelchair)?: A Little   -   Need to walk in hospital room?: A Little   -   Climbing 3-5 steps with a railin with goal of achieving supervision level of bed mobility, functional transfers & gait with RW.   Patient will benefit by nursing staff assist to ambulate for endurance with min assist.  DISCHARGE RECOMMENDATIONS  PT Discharge Recommendations: Sub-acute reha

## 2017-07-13 NOTE — SLP NOTE
SPEECH DAILY NOTE - INPATIENT    Evaluation Date: 07/13/17    ASSESSMENT & PLAN   ASSESSMENT  Pt seen at bedside this AM for trial dysphagia treatment session. Pt's son and daughter in law present during session to assist with translation.  Pt cooperative, max cues IN PROGRESS   Goal #3 Patient will perform Mendelsohn exercises to promote improved epiglottic inversion and hyolaryngeal elevation for improved CP opening wit 80% acc with max cues.  IN PROGRESS   Goal #4 Patient will perform Janis Maneuver for i

## 2017-07-13 NOTE — PROGRESS NOTES
Resumed care from [facility] at this time. Spoke with Dr. Peace Gar, notified that BP running low. Order receieved for NS 500ml bolus.

## 2017-07-13 NOTE — OCCUPATIONAL THERAPY NOTE
OCCUPATIONAL THERAPY TREATMENT NOTE - INPATIENT     Room Number: 351/351-A  Session: 2  Number of Visits to Meet Established Goals: Trial    Presenting Problem: Neck pain, recent C2 fx 3 weeks ago    History related to current admission: Patient with fall techniques;Relaxation;Repositioning     ACTIVITY TOLERANCE  O2 Saturation: 99%  Room air  No shortness of breath    ACTIVITIES OF DAILY LIVING ASSESSMENT  AM-PAC ‘6-Clicks’ Inpatient Daily Activity Short Form  How much help from another person does the pat Pt was placed on trial for therapy at this time, d/c recommendations will need to be reconsidered depending on outcomes of therapy trial.       OT Discharge Recommendations: Sub-acute rehabilitation (elos 9-11 days)  OT Device Recommendations: TBD    PLAN

## 2017-07-13 NOTE — CM/SW NOTE
SW attended care conference with MD, Palliative care APN, speech therapy, pt, and pt's son/TONY. Discussed options for care- nutrition issue, given ST rec for NPO.   Amna discussed for possible KRISTIN placement, d/c home with son/TONY and go to Outpt therapi

## 2017-07-13 NOTE — SLP NOTE
SPEECH DAILY NOTE - INPATIENT    Evaluation Date: 07/13/17    ASSESSMENT & PLAN   ASSESSMENT  Pt seen in pt's room for care conference with pt at bedside, palliative APN, , Dr. Flor Myers, pt's son, and pt's daughter-in-law present.  Discussed plan for improved strength with 80% acc with max cues   Goal #6 Patient will tolerate 1/2 tsp of strained puree and nectar-thick liquid, utilizing hard swallow, cough, reswallow , without s/sx of aspiration, with max cueing, with Speech Pathology only.  LIMIT TO

## 2017-07-13 NOTE — PALLIATIVE CARE NOTE
1808 Sami Gutierrez Follow Up      Devon Arriaza  [id number]       Patient seen and evaluated, family at bedside. Pt is awake and alert. Assessment/Recommendations:  Prognosis/Goals of Care:  [facility]

## 2017-07-13 NOTE — PROGRESS NOTES
[facility]  Progress Note    Gail Mack Patient Status:  Inpatient    1924 MRN [id number]   [facility] 3SW-A Attending Jet Sykes MD   Hosp Day # 7 PCP Shay Garcia MD         SUBJECTIVE:  Subjective:  Gail Mack is a(n 181*  207*       No results for input(s): URINE, CULTI, BLDSMR in the last 72 hours.             Meds:     • Insulin Aspart Pen  1-5 Units Subcutaneous TID CC and HS   • folic acid  1 mg Oral Daily   • Thiamine HCl  100 mg Oral Daily   • Calcium Carbonate-V aspiration PNA from not only PO trials, but also from pt's own secretions. He insists that he must explore all options. History of prior fall, history of prior C2 fracture. Currently with worsening neck pain.   At this point, defer to Dr. Taiwo Farrell

## 2017-07-13 NOTE — PLAN OF CARE
Bp=99/26 ,  Retook manually  was 108/36,  notified of bp, 500cc 0.9 NS bolus ordered, will con't to monitor.

## 2017-07-14 LAB
BASOPHILS # BLD AUTO: 0.05 X10(3) UL (ref 0–0.1)
BASOPHILS NFR BLD AUTO: 0.8 %
EOSINOPHIL # BLD AUTO: 0.08 X10(3) UL (ref 0–0.3)
EOSINOPHIL NFR BLD AUTO: 1.4 %
ERYTHROCYTE [DISTWIDTH] IN BLOOD BY AUTOMATED COUNT: 14.7 % (ref 11.5–16)
GLUCOSE BLD-MCNC: 114 MG/DL (ref 65–99)
GLUCOSE BLD-MCNC: 149 MG/DL (ref 65–99)
GLUCOSE BLD-MCNC: 203 MG/DL (ref 65–99)
GLUCOSE BLD-MCNC: 205 MG/DL (ref 65–99)
HCT VFR BLD AUTO: 29.2 % (ref 34–50)
HGB BLD-MCNC: 9.2 G/DL (ref 12–16)
IMMATURE GRANULOCYTE COUNT: 0.04 X10(3) UL (ref 0–1)
IMMATURE GRANULOCYTE RATIO %: 0.7 %
LYMPHOCYTES # BLD AUTO: 0.79 X10(3) UL (ref 0.9–4)
LYMPHOCYTES NFR BLD AUTO: 13.4 %
MCH RBC QN AUTO: 28.3 PG (ref 27–33.2)
MCHC RBC AUTO-ENTMCNC: 31.5 G/DL (ref 31–37)
MCV RBC AUTO: 89.8 FL (ref 81–100)
MONOCYTES # BLD AUTO: 0.57 X10(3) UL (ref 0.1–0.6)
MONOCYTES NFR BLD AUTO: 9.6 %
NEUTROPHIL ABS PRELIM: 4.38 X10 (3) UL (ref 1.3–6.7)
NEUTROPHILS # BLD AUTO: 4.38 X10(3) UL (ref 1.3–6.7)
NEUTROPHILS NFR BLD AUTO: 74.1 %
PLATELET # BLD AUTO: 247 10(3)UL (ref 150–450)
RBC # BLD AUTO: 3.25 X10(6)UL (ref 3.8–5.1)
RED CELL DISTRIBUTION WIDTH-SD: 48.5 FL (ref 35.1–46.3)
WBC # BLD AUTO: 5.9 X10(3) UL (ref 4–13)

## 2017-07-14 PROCEDURE — 82962 GLUCOSE BLOOD TEST: CPT

## 2017-07-14 NOTE — SLP NOTE
SPEECH DAILY NOTE - INPATIENT    Evaluation Date: 07/14/17    ASSESSMENT & PLAN   ASSESSMENT  Pt seen at bedside this PM for speech therapy treatment session. Pt's son and daughter in law present at bedside to assist in translation.  Pt santana delarosa including aggressive care (alternative means of nutrition with therapy participation including poor prognosis) and comfort care, which would include diet of choice; all verbalized understanding.  Educated pt and pt's family on therapeutic process with possi hyolaryngeal elevation for improved CP opening wit 80% acc with max cues.  IN PROGRESS   Goal #4 Patient will perform Janis Maneuver for improved strengthening of of tongue base to promote improve pharyngeal pressures with 80% acc with max cues IN PROGRESS

## 2017-07-14 NOTE — PROGRESS NOTES
[facility]  Progress Note    Myah Bahenaen Patient Status:  Inpatient    1924 MRN [id number]   [facility] 3SW-A Attending Ana María Estrada MD   Hosp Day # 8 PCP Daniel Ty MD         SUBJECTIVE:  Subjective:  Desiraeradha Montiel is a(n Oral Daily   • Thiamine HCl  100 mg Oral Daily   • Calcium Carbonate-Vitamin D  1 tablet Oral BID with meals   • Heparin Sodium (Porcine)  5,000 Units Subcutaneous 2 times per day   • Clopidogrel Bisulfate  75 mg Oral Daily   • docusate sodium  100 mg Oral Increased cholesterol. Continue simvastatin. Dementia. Continue on Aricept. Weakness. PT/OT evaluate and treat once okay with Dr. Susannah Douglass and Dr. Loy Parish.                    Disposition:  Per social worke

## 2017-07-14 NOTE — DIETARY NOTE
NUTRITION INITIAL ASSESSMENT    Pt is at moderate nutrition risk. Pt does not meet malnutrition criteria.     NUTRITION DIAGNOSIS/PROBLEM:    Inadequate oral intake related to inability to consume sufficient energy as evidenced by dysphagia, NPO    Arm[place] evidence of muscle wasting      2.  Fluid Accumulation: no edema noted    NUTRITION PRESCRIPTION:  Calories: 7162-9249 calories/day (30-35 calories per kg)  Protein: 51-64 grams protein/day (1.2-1.5 grams protein per kg)  Fluid: ~1 ml/kcal or per MD discret

## 2017-07-14 NOTE — CM/SW NOTE
MSLUZ heard back from [facility], [address] [place] [address] at the [facility], and [address] [place] [address]. All of these facilities cannot accommodate the patient.     Regino Torrez, AMBERLYW    ADDENDUM:  In order for this patient to be placed in an ECF, she will need a permanen

## 2017-07-15 LAB
ALBUMIN SERPL-MCNC: 2.3 G/DL (ref 3.5–4.8)
ALP LIVER SERPL-CCNC: 101 U/L (ref 55–142)
ALT SERPL-CCNC: 19 U/L (ref 14–54)
AST SERPL-CCNC: 16 U/L (ref 15–41)
BASOPHILS # BLD AUTO: 0.03 X10(3) UL (ref 0–0.1)
BASOPHILS NFR BLD AUTO: 0.5 %
BILIRUB SERPL-MCNC: 0.7 MG/DL (ref 0.1–2)
BUN BLD-MCNC: 8 MG/DL (ref 8–20)
CALCIUM BLD-MCNC: 8.9 MG/DL (ref 8.3–10.3)
CHLORIDE: 109 MMOL/L (ref 101–111)
CO2: 27 MMOL/L (ref 22–32)
CREAT BLD-MCNC: 0.62 MG/DL (ref 0.55–1.02)
EOSINOPHIL # BLD AUTO: 0.07 X10(3) UL (ref 0–0.3)
EOSINOPHIL NFR BLD AUTO: 1.2 %
ERYTHROCYTE [DISTWIDTH] IN BLOOD BY AUTOMATED COUNT: 14.7 % (ref 11.5–16)
GLUCOSE BLD-MCNC: 156 MG/DL (ref 65–99)
GLUCOSE BLD-MCNC: 165 MG/DL (ref 65–99)
GLUCOSE BLD-MCNC: 168 MG/DL (ref 65–99)
GLUCOSE BLD-MCNC: 175 MG/DL (ref 70–99)
GLUCOSE BLD-MCNC: 186 MG/DL (ref 65–99)
HCT VFR BLD AUTO: 29.9 % (ref 34–50)
HGB BLD-MCNC: 9.4 G/DL (ref 12–16)
IMMATURE GRANULOCYTE COUNT: 0.02 X10(3) UL (ref 0–1)
IMMATURE GRANULOCYTE RATIO %: 0.3 %
LYMPHOCYTES # BLD AUTO: 0.72 X10(3) UL (ref 0.9–4)
LYMPHOCYTES NFR BLD AUTO: 12.2 %
M PROTEIN MFR SERPL ELPH: 4.8 G/DL (ref 6.1–8.3)
MCH RBC QN AUTO: 27.9 PG (ref 27–33.2)
MCHC RBC AUTO-ENTMCNC: 31.4 G/DL (ref 31–37)
MCV RBC AUTO: 88.7 FL (ref 81–100)
MONOCYTES # BLD AUTO: 0.6 X10(3) UL (ref 0.1–0.6)
MONOCYTES NFR BLD AUTO: 10.2 %
NEUTROPHIL ABS PRELIM: 4.45 X10 (3) UL (ref 1.3–6.7)
NEUTROPHILS # BLD AUTO: 4.45 X10(3) UL (ref 1.3–6.7)
NEUTROPHILS NFR BLD AUTO: 75.6 %
PLATELET # BLD AUTO: 243 10(3)UL (ref 150–450)
POTASSIUM SERPL-SCNC: 3 MMOL/L (ref 3.6–5.1)
RBC # BLD AUTO: 3.37 X10(6)UL (ref 3.8–5.1)
RED CELL DISTRIBUTION WIDTH-SD: 47.6 FL (ref 35.1–46.3)
SODIUM SERPL-SCNC: 143 MMOL/L (ref 136–144)
WBC # BLD AUTO: 5.9 X10(3) UL (ref 4–13)

## 2017-07-15 PROCEDURE — 82962 GLUCOSE BLOOD TEST: CPT

## 2017-07-15 RX ORDER — POTASSIUM CHLORIDE 14.9 MG/ML
20 INJECTION INTRAVENOUS ONCE
Status: COMPLETED | OUTPATIENT
Start: 2017-07-15 | End: 2017-07-15

## 2017-07-15 NOTE — PLAN OF CARE
Impaired Swallowing    • Minimize aspiration risk Not Progressing        AWAKE FOR P.T. NO RESP DISTRESS. NPO UNTIL PASS THE SWALLOW. CONTINUE IVF.  FREQ CHECK

## 2017-07-15 NOTE — PLAN OF CARE
FIRST K RIDER DONE, SECOND W 20 KCL STARTED, PT C/O DISCOMFORTS SLOW IV RATE, PT STATES\" I FEEL BETTER\" MILD COLD PACK APPLIED. WILL CONTINUE MONITOR.

## 2017-07-15 NOTE — OCCUPATIONAL THERAPY NOTE
OCCUPATIONAL THERAPY TREATMENT NOTE - INPATIENT     Room Number: 351/351-A  Session: 3  Number of Visits to Meet Established Goals: Trial    Presenting Problem: Neck pain, recent C2 fx 3 weeks ago    History related to current admission: Patient with fall Saturation: 94%  Room air  No shortness of breath  Pulse Ox on L finger with low O2 readings 60-70 however pleth reflecting inaccurate reading and pt w no signs of SOB    ACTIVITIES OF DAILY LIVING ASSESSMENT  AM-PAC ‘6-Clicks’ Inpatient Daily Activity Ingrid mobility and slight improvements in tolerance for ROM exercises,  however patient remains below her baseline in these and other functional areas.  Patient would benefit from continued OT services during hospital stay to further address these deficits and as

## 2017-07-15 NOTE — PLAN OF CARE
SON AND DAUGHTER IN LAW A BS,SON  STATES\" IN CASE PT STOP BREATHING,NEED EVERYTHING DONE \"SON INSTRUCTED NOT TO GIVE ANYTHING LIQUID OR FOOD\" PT CONDITION GUARDED. HOB ,KEEP HEAD UPRIGHT POSITION. SATS REMAINED 96% RA,

## 2017-07-15 NOTE — PHYSICAL THERAPY NOTE
PHYSICAL THERAPY TREATMENT NOTE - INPATIENT    Room Number: 351/351-A     Session: 5   Number of Visits to Meet Established Goals: 5    Presenting Problem: s/p fall, C2 fx, R prox humerus fx    Problem List  Principal Problem:    Traumatic closed fracture a chair (including a wheelchair)?: A Little   -   Need to walk in hospital room?: A Little   -   Climbing 3-5 steps with a railing?: A Lot       AM-PAC Score:  Raw Score: 17   PT Approx Degree of Impairment Score: 50.57%   Standardized Score (AM-PAC Scale) functional mobility with goal of supervision level. DISCHARGE RECOMMENDATIONS  PT Discharge Recommendations: Sub-acute rehabilitation     PLAN  PT Treatment Plan: Bed mobility; Body mechanics; Don/doff brace; Endurance; Energy conservation;Patient education

## 2017-07-15 NOTE — PROGRESS NOTES
[facility]  Progress Note    James Pang Patient Status:  Inpatient    1924 MRN [id number]   [facility] 3SW-A Attending Roger Ramírez MD   Hosp Day # 9 PCP Sondra Darling MD         SUBJECTIVE:  Subjective:  James Pang is a(n Followed by   • potassium chloride  20 mEq Intravenous Once   • Insulin Aspart Pen  1-5 Units Subcutaneous TID CC and HS   • folic acid  1 mg Oral Daily   • Thiamine HCl  100 mg Oral Daily   • Calcium Carbonate-Vitamin D  1 tablet Oral BID with meals   • H consultant notes reviewed  Discussed with nursing on floor  dvt prophylaxis reviewed  PT and/or OT  Escobar Wright MD

## 2017-07-15 NOTE — PLAN OF CARE
RECD SLEEPING NO RESP DISTRESS, SATS RA 96%. MIAMI J COLLAR IN PLACE ,HOB UP. FREQ CHECK ,BED ALARM IN PLACE. KEEP NPO FOR NOW. WILL PAGE DR SMILEY VEGA [facility] FOR K RESULTS

## 2017-07-16 LAB
ALBUMIN SERPL-MCNC: 2.1 G/DL (ref 3.5–4.8)
ALP LIVER SERPL-CCNC: 97 U/L (ref 55–142)
ALT SERPL-CCNC: 17 U/L (ref 14–54)
AST SERPL-CCNC: 12 U/L (ref 15–41)
BASOPHILS # BLD AUTO: 0.03 X10(3) UL (ref 0–0.1)
BASOPHILS NFR BLD AUTO: 0.6 %
BILIRUB SERPL-MCNC: 0.7 MG/DL (ref 0.1–2)
BUN BLD-MCNC: 6 MG/DL (ref 8–20)
CALCIUM BLD-MCNC: 9.3 MG/DL (ref 8.3–10.3)
CHLORIDE: 111 MMOL/L (ref 101–111)
CO2: 27 MMOL/L (ref 22–32)
CREAT BLD-MCNC: 0.63 MG/DL (ref 0.55–1.02)
EOSINOPHIL # BLD AUTO: 0.06 X10(3) UL (ref 0–0.3)
EOSINOPHIL NFR BLD AUTO: 1.2 %
ERYTHROCYTE [DISTWIDTH] IN BLOOD BY AUTOMATED COUNT: 15 % (ref 11.5–16)
GLUCOSE BLD-MCNC: 112 MG/DL (ref 65–99)
GLUCOSE BLD-MCNC: 114 MG/DL (ref 65–99)
GLUCOSE BLD-MCNC: 151 MG/DL (ref 65–99)
GLUCOSE BLD-MCNC: 180 MG/DL (ref 70–99)
GLUCOSE BLD-MCNC: 192 MG/DL (ref 65–99)
HCT VFR BLD AUTO: 31.4 % (ref 34–50)
HGB BLD-MCNC: 9.6 G/DL (ref 12–16)
IMMATURE GRANULOCYTE COUNT: 0.03 X10(3) UL (ref 0–1)
IMMATURE GRANULOCYTE RATIO %: 0.6 %
LYMPHOCYTES # BLD AUTO: 0.83 X10(3) UL (ref 0.9–4)
LYMPHOCYTES NFR BLD AUTO: 16.8 %
M PROTEIN MFR SERPL ELPH: 5 G/DL (ref 6.1–8.3)
MCH RBC QN AUTO: 28.1 PG (ref 27–33.2)
MCHC RBC AUTO-ENTMCNC: 30.6 G/DL (ref 31–37)
MCV RBC AUTO: 91.8 FL (ref 81–100)
MONOCYTES # BLD AUTO: 0.62 X10(3) UL (ref 0.1–0.6)
MONOCYTES NFR BLD AUTO: 12.5 %
NEUTROPHIL ABS PRELIM: 3.38 X10 (3) UL (ref 1.3–6.7)
NEUTROPHILS # BLD AUTO: 3.38 X10(3) UL (ref 1.3–6.7)
NEUTROPHILS NFR BLD AUTO: 68.3 %
PLATELET # BLD AUTO: 184 10(3)UL (ref 150–450)
POTASSIUM SERPL-SCNC: 3.2 MMOL/L (ref 3.6–5.1)
RBC # BLD AUTO: 3.42 X10(6)UL (ref 3.8–5.1)
RED CELL DISTRIBUTION WIDTH-SD: 50.1 FL (ref 35.1–46.3)
SODIUM SERPL-SCNC: 143 MMOL/L (ref 136–144)
WBC # BLD AUTO: 5 X10(3) UL (ref 4–13)

## 2017-07-16 PROCEDURE — 82962 GLUCOSE BLOOD TEST: CPT

## 2017-07-16 RX ORDER — POTASSIUM CHLORIDE 14.9 MG/ML
20 INJECTION INTRAVENOUS ONCE
Status: COMPLETED | OUTPATIENT
Start: 2017-07-16 | End: 2017-07-16

## 2017-07-16 NOTE — PROGRESS NOTES
[facility]    Progress Note    Jason Rodriguez Patient Status:  Inpatient    1924 MRN [id number]   [facility] 3SW-A Attending Billy Walsh MD   Hosp Day # 10 PCP Artist Staff, MD       SUBJECTIVE:  Patient sitting up in bed, c Or      dextrose 50% injection 50 mL 50 mL Intravenous Q15 Min PRN   Or      glucose (DEX4) oral liquid 30 g 30 g Oral Q15 Min PRN   Or      Glucose-Vitamin C (DEX-4) 4-0.006 g chewable tab 8 tablet 8 tablet Oral Q15 Min PRN   insulin aspart (NOVOLOG) 10

## 2017-07-16 NOTE — PLAN OF CARE
K KATYAER DONE PT C/O TO FAMILY PAIN TO LT HAND. SWOLLEN FINGER NOT FROM IV SITE. WILL TRY TO CHANGE SITE. PT HARD IV STARTS. CRN AND NRG SUPV INFORMED FOR ASSISTANCE

## 2017-07-16 NOTE — PLAN OF CARE
Impaired Swallowing    • Minimize aspiration risk Progressing        PAIN - ADULT    • Verbalizes/displays adequate comfort level or patient's stated pain goal Progressing        SAFETY ADULT - FALL    • Free from fall injury Progressing        Patient marshall

## 2017-07-16 NOTE — PLAN OF CARE
CCU RN UNABLE TO START IV. AS SOON PT BACK TO BED WILL TRY . UP IN [facility]

## 2017-07-16 NOTE — PLAN OF CARE
Diabetes/Glucose Control    • Glucose maintained within prescribed range Progressing        Impaired Swallowing    • Minimize aspiration risk Progressing        Patient/Family Goals    • Patient/Family Long Term Goal Progressing        SAFETY ADULT - FALL

## 2017-07-16 NOTE — PHYSICAL THERAPY NOTE
PHYSICAL THERAPY TREATMENT NOTE - INPATIENT    Room Number: 351/351-A     Session: Number of Visits to Meet Established Goals: 5    Presenting Problem: s/p fall, C2 fx, R prox humerus fx    Problem List  Principal Problem:    Traumatic closed fracture of to a chair (including a wheelchair)?: A Little   -   Need to walk in hospital room?: A Little   -   Climbing 3-5 steps with a railing?: Total       AM-PAC Score:  Raw Score: 15   PT Approx Degree of Impairment Score: 57.7%   Standardized Score (AM-PAC Scal RECOMMENDATIONS  PT Discharge Recommendations: Sub-acute rehabilitation     PLAN  PT Treatment Plan: Bed mobility; Body mechanics; Don/doff brace; Endurance; Energy conservation;Patient education; Family education;Gait training;Strengthening;Transfer training;B

## 2017-07-17 LAB
BASOPHILS # BLD AUTO: 0.04 X10(3) UL (ref 0–0.1)
BASOPHILS NFR BLD AUTO: 0.8 %
EOSINOPHIL # BLD AUTO: 0.06 X10(3) UL (ref 0–0.3)
EOSINOPHIL NFR BLD AUTO: 1.2 %
ERYTHROCYTE [DISTWIDTH] IN BLOOD BY AUTOMATED COUNT: 15 % (ref 11.5–16)
GLUCOSE BLD-MCNC: 120 MG/DL (ref 65–99)
GLUCOSE BLD-MCNC: 148 MG/DL (ref 65–99)
GLUCOSE BLD-MCNC: 149 MG/DL (ref 65–99)
HCT VFR BLD AUTO: 32.5 % (ref 34–50)
HGB BLD-MCNC: 10 G/DL (ref 12–16)
IMMATURE GRANULOCYTE COUNT: 0.03 X10(3) UL (ref 0–1)
IMMATURE GRANULOCYTE RATIO %: 0.6 %
LYMPHOCYTES # BLD AUTO: 0.87 X10(3) UL (ref 0.9–4)
LYMPHOCYTES NFR BLD AUTO: 17.1 %
MCH RBC QN AUTO: 27.5 PG (ref 27–33.2)
MCHC RBC AUTO-ENTMCNC: 30.8 G/DL (ref 31–37)
MCV RBC AUTO: 89.3 FL (ref 81–100)
MONOCYTES # BLD AUTO: 0.52 X10(3) UL (ref 0.1–0.6)
MONOCYTES NFR BLD AUTO: 10.2 %
NEUTROPHIL ABS PRELIM: 3.56 X10 (3) UL (ref 1.3–6.7)
NEUTROPHILS # BLD AUTO: 3.56 X10(3) UL (ref 1.3–6.7)
NEUTROPHILS NFR BLD AUTO: 70.1 %
PLATELET # BLD AUTO: 218 10(3)UL (ref 150–450)
POTASSIUM SERPL-SCNC: 3.4 MMOL/L (ref 3.6–5.1)
RBC # BLD AUTO: 3.64 X10(6)UL (ref 3.8–5.1)
RED CELL DISTRIBUTION WIDTH-SD: 48.4 FL (ref 35.1–46.3)
WBC # BLD AUTO: 5.1 X10(3) UL (ref 4–13)

## 2017-07-17 PROCEDURE — 84132 ASSAY OF SERUM POTASSIUM: CPT | Performed by: SPECIALIST

## 2017-07-17 PROCEDURE — 82962 GLUCOSE BLOOD TEST: CPT

## 2017-07-17 PROCEDURE — 99232 SBSQ HOSP IP/OBS MODERATE 35: CPT | Performed by: CLINICAL NURSE SPECIALIST

## 2017-07-17 NOTE — PROGRESS NOTES
GI was consulted. Dr. Clayton Kauffman was called however no call back. 1646PM: RIKI was re-paged. Dr. Ethan Herrera called back and she said she would see patient tomorrow    1719PM: Dr Clayton Kauffman called back. He would call patient's son.  Informed him that Dr. Willis Prabhakar

## 2017-07-17 NOTE — PALLIATIVE CARE NOTE
1806 Sami Gutierrez Follow Up      Gail Mack  [id number]       Patient seen and evaluated, family (son and daughter-in-law) at bedside. Remains NPO - on IVF. Reports ambulating in [place].     Assessment/Recommendations:  Prognosis/Goals

## 2017-07-17 NOTE — OCCUPATIONAL THERAPY NOTE
OCCUPATIONAL THERAPY TREATMENT NOTE - INPATIENT     Room Number: 351/351-A  Session: 4  Number of Visits to Meet Established Goals: 7    Presenting Problem: Neck pain, recent C2 fx 3 weeks ago    History related to current admission: Patient with fall appr 130/42    ACTIVITIES OF DAILY LIVING ASSESSMENT  AM-PAC ‘6-Clicks’ Inpatient Daily Activity Short Form  How much help from another person does the patient currently need…  -   Putting on and taking off regular lower body clothing?: A Lot  -   Bathing (incl balance, endurance and strength. Patient will benefit from continued skilled OT intervention to maximize ADL I and safety. Patient's frequency changed to reflect increased tolerance for active participation.  Patient continues to be appropriate for KRISTIN up

## 2017-07-17 NOTE — SLP NOTE
SPEECH DAILY NOTE - INPATIENT    Evaluation Date: 07/17/17    ASSESSMENT & PLAN   ASSESSMENT  Patient seen for dysphagia therapy to train family on proper execution of exercises in hopes of improving swallow function.   Pt seen at bedside this PM for speech apparent satisfaction.     Patient son has been present for multiple therapy sessions. He verbalized that he has been practicing dysphagia therapy exercises with his mother.   He was instructed in frequency and intensity of each exercise and verbalized unde UP  Follow Up Needed: Yes  SLP Follow-up Date: 07/18/17  Number of Visits to Meet Established Goals: Trial  Session: Trial session #3, participated well; continue trial therapy. Patient prognosis to improve swallow function remains guarded.     If you have

## 2017-07-17 NOTE — PLAN OF CARE
Impaired Swallowing    • Minimize aspiration risk Not Progressing        POC explained to the patient and son verbalized understanding. Phone  was called    Patient is AO to person and place.  Verbalized in her own language translated by phone in

## 2017-07-17 NOTE — CM/SW NOTE
Informed by Rubens Blackman that facility would not accept pt without PEG tube or oral feeding IF she passes video swallow.     SW has not been able to locate a facility in this area or near pt's address in [facility] who would consider pt with NG tube or allowing f

## 2017-07-17 NOTE — PHYSICAL THERAPY NOTE
PHYSICAL THERAPY TREATMENT NOTE - INPATIENT    Room Number: 351/351-A     Session:   7/10                                                                                                                                                     Number of Visits -   Moving from lying on back to sitting on the side of the bed?: A Lot   How much help from another person does the patient currently need. ..   -   Moving to and from a bed to a chair (including a wheelchair)?: A Little   -   Need to walk in hospital ro RW, 150' with chair follow for safety and min assist. Pt presents with pain in left hand, swelling of left hand, restricted ROM of right arm and cervical spine, dec strength, dec balance and remains below baseline for functional mobility.  Pt has difficulty

## 2017-07-17 NOTE — PROGRESS NOTES
[facility]  Progress Note    James Pang Patient Status:  Inpatient    1924 MRN [id number]   [facility] 3SW-A Attending Roger Ramírez MD   Hosp Day # 6 PCP Sondra Darling MD         SUBJECTIVE:  Subjective:  James Pang is a( BLDSMR in the last 72 hours.             Meds:     • Insulin Aspart Pen  1-5 Units Subcutaneous TID CC and HS   • folic acid  1 mg Oral Daily   • Thiamine HCl  100 mg Oral Daily   • Calcium Carbonate-Vitamin D  1 tablet Oral BID with meals   • Heparin Sodiu tests ordered,  Available and radiology reviewed  All consultant notes reviewed  Discussed with nursing on floor  dvt prophylaxis reviewed  PT and/or OT  Mayte Olson MD

## 2017-07-18 LAB
BASOPHILS # BLD AUTO: 0.02 X10(3) UL (ref 0–0.1)
BASOPHILS NFR BLD AUTO: 0.3 %
EOSINOPHIL # BLD AUTO: 0.04 X10(3) UL (ref 0–0.3)
EOSINOPHIL NFR BLD AUTO: 0.6 %
ERYTHROCYTE [DISTWIDTH] IN BLOOD BY AUTOMATED COUNT: 15 % (ref 11.5–16)
GLUCOSE BLD-MCNC: 156 MG/DL (ref 65–99)
GLUCOSE BLD-MCNC: 158 MG/DL (ref 65–99)
GLUCOSE BLD-MCNC: 168 MG/DL (ref 65–99)
GLUCOSE BLD-MCNC: 173 MG/DL (ref 65–99)
HCT VFR BLD AUTO: 34.6 % (ref 34–50)
HGB BLD-MCNC: 10.8 G/DL (ref 12–16)
IMMATURE GRANULOCYTE COUNT: 0.03 X10(3) UL (ref 0–1)
IMMATURE GRANULOCYTE RATIO %: 0.5 %
LYMPHOCYTES # BLD AUTO: 0.75 X10(3) UL (ref 0.9–4)
LYMPHOCYTES NFR BLD AUTO: 11.4 %
MCH RBC QN AUTO: 28 PG (ref 27–33.2)
MCHC RBC AUTO-ENTMCNC: 31.2 G/DL (ref 31–37)
MCV RBC AUTO: 89.6 FL (ref 81–100)
MONOCYTES # BLD AUTO: 0.59 X10(3) UL (ref 0.1–0.6)
MONOCYTES NFR BLD AUTO: 9 %
NEUTROPHIL ABS PRELIM: 5.16 X10 (3) UL (ref 1.3–6.7)
NEUTROPHILS # BLD AUTO: 5.16 X10(3) UL (ref 1.3–6.7)
NEUTROPHILS NFR BLD AUTO: 78.2 %
PLATELET # BLD AUTO: 192 10(3)UL (ref 150–450)
POTASSIUM SERPL-SCNC: 3.3 MMOL/L (ref 3.6–5.1)
RBC # BLD AUTO: 3.86 X10(6)UL (ref 3.8–5.1)
RED CELL DISTRIBUTION WIDTH-SD: 48.9 FL (ref 35.1–46.3)
WBC # BLD AUTO: 6.6 X10(3) UL (ref 4–13)

## 2017-07-18 PROCEDURE — 99223 1ST HOSP IP/OBS HIGH 75: CPT | Performed by: SURGERY

## 2017-07-18 PROCEDURE — 82962 GLUCOSE BLOOD TEST: CPT

## 2017-07-18 NOTE — PLAN OF CARE
Patient's son Dae Weber) here this evening, this RN spoke with him and his wife regarding nutritional needs of patient. He states has spoken with and will again speak with internal medicine and gastroenterologist later today or tomorrow.   Patient resting in b

## 2017-07-18 NOTE — PLAN OF CARE
PAIN - ADULT    • Verbalizes/displays adequate comfort level or patient's stated pain goal Progressing        SAFETY ADULT - FALL    • Free from fall injury Progressing        Sitting up in chair, cervical collar in place.   Has ambulated with physical ther

## 2017-07-18 NOTE — CONSULTS
[facility]  Report of Consultation    French Cruz Patient Status:  Inpatient    1924 MRN [id number]   [facility] 3SW-A Attending Celia Madsen MD   Hosp Day # 15 PCP Katie Roland MD     Reason for Consultation:  Placement of surgical history. No family history on file.        Allergies:  No Known Allergies    Medications:    Current Facility-Administered Medications:   •  glucose (DEX4) oral liquid 15 g, 15 g, Oral, Q15 Min PRN **OR** Glucose-Vitamin C (DEX-4) 4-0.006 g chewab Without scleral icterus. Mucous membranes are moist. EOM are WNL. Neck: No tenderness to palpitation. Full range of motion to flexion and extension, lateral rotation and lateral flexion of cervical spine. No JVD. Supple.      Lungs: Clear to auscultat evaluations. There has been a surgical consultation request for consideration of gastric feeding tube. Plan:  1.  Due to the patient's cervical spine fracture she is not a candidate to undergo anesthesia, we would be unable to safely intubate this patie plan    Patient with  cervical spine fracture which is being treated by cervical orthosis. Although enteral feeding would be ideal, a surgical PEG tube would be high risk due to the risk of cervical injury during intubation.   We did speak with our anest

## 2017-07-18 NOTE — PHYSICAL THERAPY NOTE
PHYSICAL THERAPY TREATMENT NOTE - INPATIENT    Room Number: 351/351-A     Session:   8/10                                                                                                                                                     Number of Visits etc.): A Little   -   Moving from lying on back to sitting on the side of the bed?: A Little   How much help from another person does the patient currently need. ..   -   Moving to and from a bed to a chair (including a wheelchair)?: A Little   -   Need to Sitting     Repetitions   15   Sets   1     Patient End of Session: Up in chair;Needs met;Call light within reach; All patient questions and concerns addressed;SCDs in place; Alarm set    ASSESSMENT   The pt shows improved functional mobility.  Pt reported of

## 2017-07-18 NOTE — PALLIATIVE CARE NOTE
1808 Sami Gutierrez Follow Up      Ya Seek  [id number]       Patient seen and evaluated, no family at bedside. Remains NPO. Assessment/Recommendations:  Prognosis/Goals of Care: Awaiting recommendations from GI consultation.   Wi

## 2017-07-18 NOTE — CM/SW NOTE
CARMELITA spoke with Palliative care APN, MDs and RN. SW also discussed with dept manager re: plan. Norma to f/u with son. RN has also been in contact with son re: nutritional needs. CARMELITA following for d/c planning.

## 2017-07-18 NOTE — PLAN OF CARE
Diabetes/Glucose Control    • Glucose maintained within prescribed range Progressing        PAIN - ADULT    • Verbalizes/displays adequate comfort level or patient's stated pain goal Progressing        Patient/Family Goals    • Patient/Family [facility]

## 2017-07-18 NOTE — PROGRESS NOTES
[facility]  Progress Note    Allyssa Solano Patient Status:  Inpatient    1924 MRN [id number]   [facility] 3SW-A Attending Peace Busby MD   Hosp Day # 15 PCP Jazzmine Obrien MD         SUBJECTIVE:  Subjective:  Allyssa Solano is a( No results for input(s): URINE, CULTI, BLDSMR in the last 72 hours.             Meds:     • Insulin Aspart Pen  1-5 Units Subcutaneous TID CC and HS   • folic acid  1 mg Oral Daily   • Thiamine HCl  100 mg Oral Daily   • Calcium Carbonate-Vitamin D  1 as above    Type 2 diabetes. Hold metformin, sliding scale insulin. History of prior fall, history of prior C2 fracture. Currently with worsening neck pain. At this point, defer to Dr. Traci Roth. Right arm fracture.  -dr Fiordaliza Bosch consult.  - stat

## 2017-07-18 NOTE — PLAN OF CARE
Per Dr. Riddhi Nichols, do not place order for picc line or tpn feeding. He has spoken with interventional radiologist regarding  insertion of peg tube tomorrow, pending patient's son consent.

## 2017-07-19 LAB
ALBUMIN SERPL-MCNC: 1.9 G/DL (ref 3.5–4.8)
ALP LIVER SERPL-CCNC: 106 U/L (ref 55–142)
ALT SERPL-CCNC: 12 U/L (ref 14–54)
AST SERPL-CCNC: 17 U/L (ref 15–41)
BILIRUB SERPL-MCNC: 0.7 MG/DL (ref 0.1–2)
BUN BLD-MCNC: 6 MG/DL (ref 8–20)
CALCIUM BLD-MCNC: 8.8 MG/DL (ref 8.3–10.3)
CHLORIDE: 109 MMOL/L (ref 101–111)
CO2: 26 MMOL/L (ref 22–32)
CREAT BLD-MCNC: 0.63 MG/DL (ref 0.55–1.02)
GLUCOSE BLD-MCNC: 122 MG/DL (ref 65–99)
GLUCOSE BLD-MCNC: 160 MG/DL (ref 65–99)
GLUCOSE BLD-MCNC: 179 MG/DL (ref 65–99)
GLUCOSE BLD-MCNC: 179 MG/DL (ref 70–99)
GLUCOSE BLD-MCNC: 197 MG/DL (ref 65–99)
M PROTEIN MFR SERPL ELPH: 5.2 G/DL (ref 6.1–8.3)
POTASSIUM SERPL-SCNC: 3.6 MMOL/L (ref 3.6–5.1)
SODIUM SERPL-SCNC: 140 MMOL/L (ref 136–144)

## 2017-07-19 PROCEDURE — 82962 GLUCOSE BLOOD TEST: CPT

## 2017-07-19 PROCEDURE — 99497 ADVNCD CARE PLAN 30 MIN: CPT | Performed by: NURSE PRACTITIONER

## 2017-07-19 PROCEDURE — 99233 SBSQ HOSP IP/OBS HIGH 50: CPT | Performed by: NURSE PRACTITIONER

## 2017-07-19 PROCEDURE — 99498 ADVNCD CARE PLAN ADDL 30 MIN: CPT | Performed by: NURSE PRACTITIONER

## 2017-07-19 NOTE — PROGRESS NOTES
2256NB: RN attempted to call son however straight to voicemail    RN called son and he said he would come in at [address] [place] [address] today to discuss GI recommendations    1130AM: RN spoke with [facility] asked if someone can come up here, explain to son the procedure and si

## 2017-07-19 NOTE — PROGRESS NOTES
[facility]  Progress Note    Rashard Dhaliwal Patient Status:  Inpatient    1924 MRN [id number]   [facility] 3SW-A Attending Harris Kumar MD   Hosp Day # 15 PCP Esau Cruz MD         SUBJECTIVE:  Subjective:  Rashard Dhaliwal is a( No results for input(s): URINE, CULTI, BLDSMR in the last 72 hours.             Meds:     • potassium chloride 40mEq IVPB (peripheral line)  40 mEq Intravenous Once   • Insulin Aspart Pen  1-5 Units Subcutaneous TID CC and HS   • folic acid  1 mg Oral md to reconsider. D/w [facility] at [address] [place] [address] over phone yesterday and today- spoke with son -guzman- he is agreeable to palliative care following    Nutrition- ivf on/ will defer to family decision and gi- tpn- as above    Type 2 diabetes.   Hold metformin,

## 2017-07-19 NOTE — PALLIATIVE CARE NOTE
1809 Sami Gutierrez Follow Up      Jonatan Murguia  [id number]       Assessment/Recommendations:  Prognosis/Goals of Care: Contacted son - Patrica Manners to continue [address] discussion. Attempted to discuss GI recommendations.  Son very upset with palli

## 2017-07-19 NOTE — PHYSICAL THERAPY NOTE
PHYSICAL THERAPY TREATMENT NOTE - INPATIENT    Room Number: 569/830-J     Session:   9/10                                                                                                                                                     Number of Visits etc.): A Little   -   Moving from lying on back to sitting on the side of the bed?: A Little   How much help from another person does the patient currently need. ..   -   Moving to and from a bed to a chair (including a wheelchair)?: A Little   -   Need to Upper Extremity ROM of left hand and elbow     Position Sitting     Repetitions   15   Sets   1     Patient End of Session: Up in chair;Needs met;Call light within reach;RN aware of session/findings; All patient questions and concerns addressed;SCDs in pl Comments: Goals established on 7/6/2017,Ongoing 7/19/17

## 2017-07-19 NOTE — DIETARY NOTE
NUTRITION FOLLOW-UP ASSESSMENT    Pt is at moderate nutrition risk. Pt does not meet malnutrition criteria.     NUTRITION DIAGNOSIS/PROBLEM:    Inadequate oral intake related to inability to consume sufficient energy as evidenced by dysphagia, NPO    NUTRIT noted    NUTRITION PRESCRIPTION:  Calories: 4460-8262 calories/day (30-35 calories per kg)  Protein: 51-64 grams protein/day (1.2-1.5 grams protein per kg)  Fluid: ~1 ml/kcal or per MD discretion    MONITOR AND EVALUATE/NUTRITION GOALS:    3. No signs of s

## 2017-07-19 NOTE — PROGRESS NOTES
Gastroenterology Progress Note  Patient Name: Channing Blanco  Chief Complaint: neck pain  S: Continues to rest in bed, C collar in place. She remains NPO and without any nutritional access.     O: /43 (BP Location: Left arm)   Pulse 63   Temp 98.3 °F (3 nor the degree of anesthesia.   I have called Bimal Soto to discuss this four times today; he did call back on one occ, but said he wanted to discuss with other consultants his options before deciding and would like to re-assess in AM.  She could have TPN in inte

## 2017-07-19 NOTE — SLP NOTE
SPEECH DAILY NOTE - INPATIENT    Evaluation Date: 07/19/17    ASSESSMENT & PLAN   ASSESSMENT  Patient seen for dysphagia therapy. No family present. Today is day 15 without nutrition, patient son considering options for alternative nutrition.   Contacted aspiration, with max cueing, with Speech Pathology only. LIMIT TO 10 SWALLOWS PER SESSION.  NOT TARGETED, PT NOT APPROPRIATE    Goal #7 Reassess via VFSS if/when clinically appropriate MET- PER MD REQUEST 7/10/17       FOLLOW UP  Follow Up Needed: Yes  SLP

## 2017-07-19 NOTE — OCCUPATIONAL THERAPY NOTE
OCCUPATIONAL THERAPY TREATMENT NOTE - INPATIENT     Room Number: 351/351-A  Session: 4  Number of Visits to Meet Established Goals: 7    Presenting Problem: Neck pain, recent C2 fx 3 weeks ago    History related to current admission: Patient with fall appr LIVING ASSESSMENT  AM-PAC ‘6-Clicks’ Inpatient Daily Activity Short Form  How much help from another person does the patient currently need…  -   Putting on and taking off regular lower body clothing?: A Lot  -   Bathing (including washing, rinsing, drying These impairments continue to impact functionally during ADLs and functional transfers. Pt has been NPO since admit on 7/4/17 and is demonstrating increased weakness.       OT Discharge Recommendations: Sub-acute rehabilitation (elos 9-11 days)  OT Device R

## 2017-07-19 NOTE — PROGRESS NOTES
1530PM: Dr. Talia Palmer from  is currently here to talk with the patient's son.  Dr. Cruz Prime aware, will come and talk to the son as well

## 2017-07-19 NOTE — CM/SW NOTE
CARMELITA and [facility] spoke with son via speaker phone. MD discussed options with son and that IR and GI would be discussing G-tube/PEG tube procedures with him and he will need to make a decision about nutrition.   MD also spoke with son about wanting Palliativ

## 2017-07-19 NOTE — PALLIATIVE CARE NOTE
9848 Sami Gutierrez Follow Up      Susan Arango  [id number]       Patient seen and evaluated, family at bedside. Pt is awake and alert, denies complaints.     Assessment/Recommendations:  Prognosis/Goals of Care: Present for visits with

## 2017-07-19 NOTE — PLAN OF CARE
Diabetes/Glucose Control    • Glucose maintained within prescribed range Progressing        Impaired Swallowing    • Minimize aspiration risk Progressing        Patient/Family Goals    • Patient/Family Short Term Goal Progressing        SAFETY ADULT - FALL

## 2017-07-20 LAB
ALBUMIN SERPL-MCNC: 2.2 G/DL (ref 3.5–4.8)
ALP LIVER SERPL-CCNC: 112 U/L (ref 55–142)
ALT SERPL-CCNC: 14 U/L (ref 14–54)
AST SERPL-CCNC: 14 U/L (ref 15–41)
BASOPHILS # BLD AUTO: 0.06 X10(3) UL (ref 0–0.1)
BASOPHILS NFR BLD AUTO: 1.1 %
BILIRUB SERPL-MCNC: 0.8 MG/DL (ref 0.1–2)
BUN BLD-MCNC: 4 MG/DL (ref 8–20)
CALCIUM BLD-MCNC: 9.7 MG/DL (ref 8.3–10.3)
CHLORIDE: 108 MMOL/L (ref 101–111)
CO2: 26 MMOL/L (ref 22–32)
CREAT BLD-MCNC: 0.56 MG/DL (ref 0.55–1.02)
EOSINOPHIL # BLD AUTO: 0.06 X10(3) UL (ref 0–0.3)
EOSINOPHIL NFR BLD AUTO: 1.1 %
ERYTHROCYTE [DISTWIDTH] IN BLOOD BY AUTOMATED COUNT: 15 % (ref 11.5–16)
GLUCOSE BLD-MCNC: 142 MG/DL (ref 65–99)
GLUCOSE BLD-MCNC: 151 MG/DL (ref 65–99)
GLUCOSE BLD-MCNC: 164 MG/DL (ref 65–99)
GLUCOSE BLD-MCNC: 179 MG/DL (ref 70–99)
GLUCOSE BLD-MCNC: 198 MG/DL (ref 65–99)
GLUCOSE BLD-MCNC: 202 MG/DL (ref 65–99)
HCT VFR BLD AUTO: 35.2 % (ref 34–50)
HGB BLD-MCNC: 11 G/DL (ref 12–16)
IMMATURE GRANULOCYTE COUNT: 0.02 X10(3) UL (ref 0–1)
IMMATURE GRANULOCYTE RATIO %: 0.4 %
LYMPHOCYTES # BLD AUTO: 1.36 X10(3) UL (ref 0.9–4)
LYMPHOCYTES NFR BLD AUTO: 25.2 %
M PROTEIN MFR SERPL ELPH: 5.3 G/DL (ref 6.1–8.3)
MCH RBC QN AUTO: 28.4 PG (ref 27–33.2)
MCHC RBC AUTO-ENTMCNC: 31.3 G/DL (ref 31–37)
MCV RBC AUTO: 91 FL (ref 81–100)
MONOCYTES # BLD AUTO: 0.52 X10(3) UL (ref 0.1–0.6)
MONOCYTES NFR BLD AUTO: 9.6 %
NEUTROPHIL ABS PRELIM: 3.37 X10 (3) UL (ref 1.3–6.7)
NEUTROPHILS # BLD AUTO: 3.37 X10(3) UL (ref 1.3–6.7)
NEUTROPHILS NFR BLD AUTO: 62.6 %
PLATELET # BLD AUTO: 187 10(3)UL (ref 150–450)
POTASSIUM SERPL-SCNC: 3.5 MMOL/L (ref 3.6–5.1)
POTASSIUM SERPL-SCNC: 3.8 MMOL/L (ref 3.6–5.1)
RBC # BLD AUTO: 3.87 X10(6)UL (ref 3.8–5.1)
RED CELL DISTRIBUTION WIDTH-SD: 49.6 FL (ref 35.1–46.3)
SODIUM SERPL-SCNC: 140 MMOL/L (ref 136–144)
WBC # BLD AUTO: 5.4 X10(3) UL (ref 4–13)

## 2017-07-20 PROCEDURE — 82962 GLUCOSE BLOOD TEST: CPT

## 2017-07-20 PROCEDURE — 99231 SBSQ HOSP IP/OBS SF/LOW 25: CPT | Performed by: NURSE PRACTITIONER

## 2017-07-20 NOTE — SLP NOTE
SPEECH DAILY NOTE - INPATIENT    Evaluation Date: 07/20/17    ASSESSMENT & PLAN   ASSESSMENT  Patient seen for dysphagia therapy to improve swallow function. No family present.   Today is day 16 without nutrition, patient son considering options for altern Speech Pathology only. LIMIT TO 10 SWALLOWS PER SESSION.  NOT TARGETED, PT NOT APPROPRIATE    Goal #7 Reassess via VFSS if/when clinically appropriate MET- PER MD REQUEST 7/10/17       FOLLOW UP  Follow Up Needed: Yes  SLP Follow-up Date: 07/21/17  Number o

## 2017-07-20 NOTE — CM/SW NOTE
Met at length with son and dtr in law. They had many questions about home services. DME will need to be set up- hospital bed, commode, transfer tube bench. Pt has a rollator at home- 4 wheels; SW noted that this will likely not be recommended for pt.   Maddi Morrissey

## 2017-07-20 NOTE — PROGRESS NOTES
Gastroenterology Progress Note  Patient Name: Mu Cullen  Chief Complaint: failure to thrive  S: No new events with patient. Son requested visit to discuss options.   O: /45   Pulse 65   Temp 97.7 °F (36.5 °C) (Oral)   Resp 18   Ht 162.6 cm (5' 4\" attempt PEG. EGD would be performed with patient flat, using minimal systemic anesthesia and focusing on local/topical anesthesia.   Alternatively, she could have Dobhoff placed and gastrostomy placed via IR, as this would not require the neck positioning n

## 2017-07-20 NOTE — PLAN OF CARE
Updated Dr. Briseyda Payne regarding patient. Labs reviewed. K+ IVPB given, pt remains stable. Son currently at bedside.

## 2017-07-20 NOTE — CM/SW NOTE
CARMELITA informed by Rachna Kumari this AM that son had decided to bring pt to his home. Palliative care will need to be arranged to follow at home, home RN/PT/ST services.   MD also noted that a home physician would be beneficial.    CARMELITA spoke with pt's son by phone

## 2017-07-20 NOTE — PLAN OF CARE
Problem: Impaired Swallowing  Goal: Minimize aspiration risk  NPO  Oral Care  Dysphagia therapy exercises       Outcome: Progressing

## 2017-07-20 NOTE — PROGRESS NOTES
Linda Javed 1943 Follow Up    Pt seen and evaluated at bedside. She denies any pain. Awake and alert. Assessment/Recommendations:    Prognosis/Goals of Care:  Long phone discussion with son.   He wants to take patient home with home care and pa

## 2017-07-20 NOTE — PROGRESS NOTES
[facility]  Progress Note    Malachi Apgar Patient Status:  Inpatient    1924 MRN [id number]   [facility] 3SW-A Attending Emilia Sweeney MD   Hosp Day # 15 PCP Dolores Singleton MD         SUBJECTIVE:  Subjective:  Malachi Apgar is a( 0002  07/20/17   0648   PGLU  179*  122*  160*  142*  151*       No results for input(s): URINE, CULTI, BLDSMR in the last 72 hours.             Meds:     • potassium chloride 40mEq IVPB (peripheral line)  40 mEq Intravenous Once   • Insulin Aspart Pen  1-5 khushbu multiple times- PC team following  Son leaning towards going home with hhc and palliative care- I spoke with rn fantasma, s/w jan/  rip- requested them to arrange appt for f/u with her own pcp or arrange inhouse rounding md as patient home

## 2017-07-21 PROBLEM — S42.291A CLOSED FRACTURE OF HEAD OF RIGHT HUMERUS: Status: ACTIVE | Noted: 2017-07-21

## 2017-07-21 LAB
ALBUMIN SERPL-MCNC: 2 G/DL (ref 3.5–4.8)
ALP LIVER SERPL-CCNC: 105 U/L (ref 55–142)
ALT SERPL-CCNC: 12 U/L (ref 14–54)
AST SERPL-CCNC: 12 U/L (ref 15–41)
BASOPHILS # BLD AUTO: 0.05 X10(3) UL (ref 0–0.1)
BASOPHILS NFR BLD AUTO: 0.9 %
BILIRUB SERPL-MCNC: 0.7 MG/DL (ref 0.1–2)
BUN BLD-MCNC: 6 MG/DL (ref 8–20)
CALCIUM BLD-MCNC: 8.9 MG/DL (ref 8.3–10.3)
CHLORIDE: 107 MMOL/L (ref 101–111)
CO2: 27 MMOL/L (ref 22–32)
CREAT BLD-MCNC: 0.58 MG/DL (ref 0.55–1.02)
EOSINOPHIL # BLD AUTO: 0.05 X10(3) UL (ref 0–0.3)
EOSINOPHIL NFR BLD AUTO: 0.9 %
ERYTHROCYTE [DISTWIDTH] IN BLOOD BY AUTOMATED COUNT: 14.8 % (ref 11.5–16)
GLUCOSE BLD-MCNC: 116 MG/DL (ref 65–99)
GLUCOSE BLD-MCNC: 148 MG/DL (ref 65–99)
GLUCOSE BLD-MCNC: 163 MG/DL (ref 65–99)
GLUCOSE BLD-MCNC: 167 MG/DL (ref 65–99)
GLUCOSE BLD-MCNC: 179 MG/DL (ref 70–99)
HCT VFR BLD AUTO: 34.6 % (ref 34–50)
HGB BLD-MCNC: 10.6 G/DL (ref 12–16)
IMMATURE GRANULOCYTE COUNT: 0.02 X10(3) UL (ref 0–1)
IMMATURE GRANULOCYTE RATIO %: 0.3 %
LYMPHOCYTES # BLD AUTO: 1.19 X10(3) UL (ref 0.9–4)
LYMPHOCYTES NFR BLD AUTO: 20.6 %
M PROTEIN MFR SERPL ELPH: 5 G/DL (ref 6.1–8.3)
MCH RBC QN AUTO: 28 PG (ref 27–33.2)
MCHC RBC AUTO-ENTMCNC: 30.6 G/DL (ref 31–37)
MCV RBC AUTO: 91.3 FL (ref 81–100)
MONOCYTES # BLD AUTO: 0.53 X10(3) UL (ref 0.1–0.6)
MONOCYTES NFR BLD AUTO: 9.2 %
NEUTROPHIL ABS PRELIM: 3.94 X10 (3) UL (ref 1.3–6.7)
NEUTROPHILS # BLD AUTO: 3.94 X10(3) UL (ref 1.3–6.7)
NEUTROPHILS NFR BLD AUTO: 68.1 %
PLATELET # BLD AUTO: 158 10(3)UL (ref 150–450)
POTASSIUM SERPL-SCNC: 3.5 MMOL/L (ref 3.6–5.1)
RBC # BLD AUTO: 3.79 X10(6)UL (ref 3.8–5.1)
RED CELL DISTRIBUTION WIDTH-SD: 50 FL (ref 35.1–46.3)
SODIUM SERPL-SCNC: 140 MMOL/L (ref 136–144)
WBC # BLD AUTO: 5.8 X10(3) UL (ref 4–13)

## 2017-07-21 PROCEDURE — 99231 SBSQ HOSP IP/OBS SF/LOW 25: CPT | Performed by: NURSE PRACTITIONER

## 2017-07-21 PROCEDURE — 82962 GLUCOSE BLOOD TEST: CPT

## 2017-07-21 NOTE — SLP NOTE
SPEECH DAILY NOTE - INPATIENT    Evaluation Date: 07/21/17    ASSESSMENT & PLAN   ASSESSMENT  Patient seen for dysphagia therapy exercises. Patient alert and up in bedside chair. Followed directions well and recalls exercises given min prompting.   Excell Goal #5  Updated 7/21 Patient will perform resistive tongue exercises for improved strength with 100% acc with min cues IN PROGRESS   Goal #6 Patient will tolerate 1/2 tsp of strained puree and nectar-thick liquid, utilizing hard swallow, cough, reswallo

## 2017-07-21 NOTE — OCCUPATIONAL THERAPY NOTE
OCCUPATIONAL THERAPY TREATMENT NOTE - INPATIENT     Room Number: 351/351-A  Session: 5  Number of Visits to Meet Established Goals: 7    Presenting Problem: Neck pain, recent C2 fx 3 weeks ago    History related to current admission: Patient with fall appr ASSESSMENT  AM-PAC ‘6-Clicks’ Inpatient Daily Activity Short Form  How much help from another person does the patient currently need…  -   Putting on and taking off regular lower body clothing?: A Lot  -   Bathing (including washing, rinsing, drying)?: A L health PT/OT  OT Device Recommendations: TBD    PLAN  OT Treatment Plan: Balance activities; Energy conservation/work simplification techniques;ADL training;Functional transfer training;UE strengthening/ROM; Endurance training;Cognitive reorientation;Patient

## 2017-07-21 NOTE — PROGRESS NOTES
Linda Javed 1943 Follow Up    Pt seen and evaluated at bedside. Patient is sitting in chair watching tv. She denies pain or discomfort. Patient walks with assist with PT.   No grimacing or moaning noted      Assessment/Recommendations:    Progno

## 2017-07-21 NOTE — PROGRESS NOTES
[facility]  Progress Note    Murray Vargas Patient Status:  Inpatient    1924 MRN [id number]   [facility] 3SW-A Attending Monalisa Bai MD   Hosp Day # 13 PCP Wil Carney MD         SUBJECTIVE:  Subjective:  Murray Vargas is a(   07/20/17   1854  07/20/17   2106  07/21/17   0751  07/21/17   1213   PGLU  164*  198*  202*  163*  116*       No results for input(s): URINE, CULTI, BLDSMR in the last 72 hours.             Meds:     • potassium chloride 40mEq IVPB (peripheral line) action, as off/ without nutrition currently    ng  Son leaning towards going home with hhc and palliative care--d/w s/w jan/  rip- requested them to arrange appt for f/u with her own pcp or arrange inhouse rounding md as patient home bound and

## 2017-07-21 NOTE — CM/SW NOTE
Spoke with [place] at [address]. He did received signed orders and progress note- will schedule delivery of hospital bed and commode with son for tomorrow. [facility] not able to provide tub transfer bench. RN reports that pt has a glucometer in her room.

## 2017-07-21 NOTE — CM/SW NOTE
Hospital bed required for home due to need for frequent repositioning that cannot be provided in an ordinary bed. Pt has diagnosis of C2 fracture, must wear collar at all times.

## 2017-07-22 VITALS
SYSTOLIC BLOOD PRESSURE: 122 MMHG | BODY MASS INDEX: 16.05 KG/M2 | TEMPERATURE: 98 F | DIASTOLIC BLOOD PRESSURE: 45 MMHG | HEART RATE: 53 BPM | RESPIRATION RATE: 18 BRPM | HEIGHT: 64 IN | WEIGHT: 94 LBS | OXYGEN SATURATION: 99 %

## 2017-07-22 LAB
ALBUMIN SERPL-MCNC: 2.1 G/DL (ref 3.5–4.8)
ALP LIVER SERPL-CCNC: 106 U/L (ref 55–142)
ALT SERPL-CCNC: 13 U/L (ref 14–54)
AST SERPL-CCNC: 10 U/L (ref 15–41)
BASOPHILS # BLD AUTO: 0.03 X10(3) UL (ref 0–0.1)
BASOPHILS NFR BLD AUTO: 0.7 %
BILIRUB SERPL-MCNC: 0.7 MG/DL (ref 0.1–2)
BUN BLD-MCNC: 5 MG/DL (ref 8–20)
CALCIUM BLD-MCNC: 9.2 MG/DL (ref 8.3–10.3)
CHLORIDE: 109 MMOL/L (ref 101–111)
CO2: 26 MMOL/L (ref 22–32)
CREAT BLD-MCNC: 0.55 MG/DL (ref 0.55–1.02)
EOSINOPHIL # BLD AUTO: 0.04 X10(3) UL (ref 0–0.3)
EOSINOPHIL NFR BLD AUTO: 0.9 %
ERYTHROCYTE [DISTWIDTH] IN BLOOD BY AUTOMATED COUNT: 15 % (ref 11.5–16)
GLUCOSE BLD-MCNC: 112 MG/DL (ref 65–99)
GLUCOSE BLD-MCNC: 171 MG/DL (ref 70–99)
GLUCOSE BLD-MCNC: 192 MG/DL (ref 65–99)
HCT VFR BLD AUTO: 33.5 % (ref 34–50)
HGB BLD-MCNC: 10.4 G/DL (ref 12–16)
IMMATURE GRANULOCYTE COUNT: 0.02 X10(3) UL (ref 0–1)
IMMATURE GRANULOCYTE RATIO %: 0.4 %
LYMPHOCYTES # BLD AUTO: 0.83 X10(3) UL (ref 0.9–4)
LYMPHOCYTES NFR BLD AUTO: 18.2 %
M PROTEIN MFR SERPL ELPH: 4.9 G/DL (ref 6.1–8.3)
MCH RBC QN AUTO: 28.1 PG (ref 27–33.2)
MCHC RBC AUTO-ENTMCNC: 31 G/DL (ref 31–37)
MCV RBC AUTO: 90.5 FL (ref 81–100)
MONOCYTES # BLD AUTO: 0.42 X10(3) UL (ref 0.1–0.6)
MONOCYTES NFR BLD AUTO: 9.2 %
NEUTROPHIL ABS PRELIM: 3.23 X10 (3) UL (ref 1.3–6.7)
NEUTROPHILS # BLD AUTO: 3.23 X10(3) UL (ref 1.3–6.7)
NEUTROPHILS NFR BLD AUTO: 70.6 %
PLATELET # BLD AUTO: 159 10(3)UL (ref 150–450)
POTASSIUM SERPL-SCNC: 3.5 MMOL/L (ref 3.6–5.1)
POTASSIUM SERPL-SCNC: 3.5 MMOL/L (ref 3.6–5.1)
RBC # BLD AUTO: 3.7 X10(6)UL (ref 3.8–5.1)
RED CELL DISTRIBUTION WIDTH-SD: 49.2 FL (ref 35.1–46.3)
SODIUM SERPL-SCNC: 141 MMOL/L (ref 136–144)
WBC # BLD AUTO: 4.6 X10(3) UL (ref 4–13)

## 2017-07-22 PROCEDURE — 82962 GLUCOSE BLOOD TEST: CPT

## 2017-07-22 NOTE — CM/SW NOTE
robb contacted Valerie at [facility] and learned that son cancelled all delivery of DME at this time and will call if they feel they need the DME. RN updated. Family plans to take pt home today via family car around noon.

## 2017-07-22 NOTE — PROGRESS NOTES
[facility]  Progress Note    Ya Seek Patient Status:  Inpatient    1924 MRN [id number]   [facility] 3SW-A Attending Diogo Neri MD   Hosp Day # 12 PCP Warren Manzano MD         SUBJECTIVE:  Subjective:  Ya Seek is a( Recent Labs   Lab  07/21/17   0751  07/21/17   1213  07/21/17   1755  07/21/17   2048  07/22/17   0643   PGLU  163*  116*  148*  167*  192*       No results for input(s): URINE, CULTI, BLDSMR in the last 72 hours.             Meds:     • potassium chl while family determines course of action, as off/ without nutrition currently      Son leaning towards going home with hhc and palliative care--d/w s/w jan/  rip- requested them to arrange appt for f/u with her own pcp or arrange inhouse round

## 2017-07-22 NOTE — PROGRESS NOTES
NURSING DISCHARGE NOTE    Discharged via wheelchair  Accompanied by son and daughter in law  Belongings all sent with the patient    Discharge instructions was discussed thoroughly with patient's son and daughter in law.  Rx for Thiamine, Folic Acid, Ty

## 2017-07-22 NOTE — PROGRESS NOTES
IV got infiltrated. Attempt to start a new IV x3 but to no success. More than half of the bag of the Potassium was given. Dr. Hipolito Howard made aware and said its okay.  Patient will be discharged today to home at noon time

## 2017-07-24 NOTE — CM/SW NOTE
07/24/17 0800   Discharge disposition   Discharged to: Home-Health   Name of [facility] [facility] services after discharge Other (comment); Skilled home care;DME  ([facility] Home)   [facility] provider Other (comment)  ([facility]

## 2017-08-07 NOTE — DISCHARGE SUMMARY
[facility]  Discharge Summary    Eb Payer Patient Status:  Inpatient    1924 MRN [id number]   [facility] 3SW-A Attending No att. providers found   Hosp Day # 12 PCP Veronika Milton MD     Date of Admission: 2017    Date of Neurologic :  General weakness,                                                         Data Review:       Labs:            Recent Labs   Lab  07/20/17   0927  07/21/17   0653  07/22/17   0537   WBC  5.4  5.8  4.6   HGB  11.0*  10.6*  10.4*   MCV  91.0  91 Problems:    Traumatic closed fracture of C2 vertebra with minimal displacement (HCC)    Dementia    Altered awareness, transient    Palliative care encounter    Goals of care, counseling/discussion    Dysphagia    Palliative care by specialist             DEFER TO ST        Hypotension - iv fluids      FTT with poor prognosis/ family aware that feeding po and her own secretions may cause aspiartion, pneumonia, sepsis/ death     Await disposition- per sw and per family decision     See tests ordered,  Availa Tab          Follow up Visits: Follow-up with all MD's as per their recommendation/ refer to chart for details. 3 days    Blanca Turk  8/7/2017  9:36 AM

## 2017-08-25 ENCOUNTER — TELEPHONE (OUTPATIENT)
Dept: SURGERY | Facility: CLINIC | Age: 82
End: 2017-08-25

## 2017-08-25 NOTE — TELEPHONE ENCOUNTER
Spoke to son Nhi Laguna who states pt was recently seen in the hospital and had swallow evaluation, states he would like a Chest xray ordered to assess these findings.   He would also like blood work ordered to see if there are any changes since discharged from h

## 2017-09-05 ENCOUNTER — HOSPITAL ENCOUNTER (OUTPATIENT)
Dept: GENERAL RADIOLOGY | Facility: HOSPITAL | Age: 82
Discharge: HOME | End: 2017-09-05
Payer: MEDICARE

## 2017-09-05 ENCOUNTER — OFFICE VISIT (OUTPATIENT)
Dept: SURGERY | Facility: CLINIC | Age: 82
End: 2017-09-05

## 2017-09-05 ENCOUNTER — HOSPITAL ENCOUNTER (OUTPATIENT)
Dept: GENERAL RADIOLOGY | Facility: HOSPITAL | Age: 82
Discharge: HOME | End: 2017-09-05
Attending: NURSE PRACTITIONER
Payer: MEDICARE

## 2017-09-05 VITALS
HEART RATE: 78 BPM | SYSTOLIC BLOOD PRESSURE: 96 MMHG | WEIGHT: 93 LBS | BODY MASS INDEX: 17.56 KG/M2 | DIASTOLIC BLOOD PRESSURE: 64 MMHG | RESPIRATION RATE: 12 BRPM | HEIGHT: 61 IN

## 2017-09-05 DIAGNOSIS — S12.100S TRAUMATIC CLOSED FRACTURE OF C2 VERTEBRA WITH MINIMAL DISPLACEMENT, SEQUELA: ICD-10-CM

## 2017-09-05 DIAGNOSIS — S12.100G CLOSED DISPLACED FRACTURE OF SECOND CERVICAL VERTEBRA WITH DELAYED HEALING, UNSPECIFIED FRACTURE MORPHOLOGY, SUBSEQUENT ENCOUNTER: Primary | ICD-10-CM

## 2017-09-05 DIAGNOSIS — Z91.89 OTHER SPECIFIED PERSONAL RISK FACTORS, NOT ELSEWHERE CLASSIFIED: ICD-10-CM

## 2017-09-05 PROBLEM — S12.100A CLOSED C2 FRACTURE (HCC): Status: ACTIVE | Noted: 2017-09-05

## 2017-09-05 PROBLEM — S12.130S: Status: ACTIVE | Noted: 2017-09-05

## 2017-09-05 PROCEDURE — 71020 XR CHEST PA + LAT CHEST (CPT=71020): CPT | Performed by: INTERNAL MEDICINE

## 2017-09-05 PROCEDURE — 99213 OFFICE O/P EST LOW 20 MIN: CPT | Performed by: NEUROLOGICAL SURGERY

## 2017-09-05 PROCEDURE — 72040 X-RAY EXAM NECK SPINE 2-3 VW: CPT | Performed by: NURSE PRACTITIONER

## 2017-09-05 PROCEDURE — 71020 XR CHEST PA + LAT CHEST (CPT=71020): CPT

## 2017-09-05 NOTE — PATIENT INSTRUCTIONS
Refill policies:    • Allow 2-3 business days for refills; controlled substances may take longer.   • Contact your pharmacy at least 5 days prior to running out of medication and have them send an electronic request or submit request through the [facility] have a procedure or additional testing performed. [facility]) will contact your insurance carrier to obtain pre-certification or prior authorization.     Unfortunately, LADY has seen an increase in denial of payment even though the p

## 2017-09-05 NOTE — PROGRESS NOTES
Neurosurgery Clinic Visit  2017    Claudia Camarillo PCP:  Veronika Milton MD    1924 MRN [id number]     HISTORY OF PRESENT ILLNESS:  Claudia Camarillo is a(n) 80year old female here for follow-up regarding C2 fracture  She has been

## 2017-09-19 ENCOUNTER — TELEPHONE (OUTPATIENT)
Dept: SURGERY | Facility: CLINIC | Age: 82
End: 2017-09-19

## 2017-09-19 DIAGNOSIS — S12.100S TRAUMATIC CLOSED FRACTURE OF C2 VERTEBRA WITH MINIMAL DISPLACEMENT, SEQUELA: Primary | ICD-10-CM

## 2017-09-19 DIAGNOSIS — R13.10 DYSPHAGIA, UNSPECIFIED TYPE: ICD-10-CM

## 2017-10-12 ENCOUNTER — HOSPITAL ENCOUNTER (OUTPATIENT)
Dept: GENERAL RADIOLOGY | Facility: HOSPITAL | Age: 82
Discharge: HOME | End: 2017-10-12
Attending: NEUROLOGICAL SURGERY
Payer: MEDICARE

## 2017-10-12 ENCOUNTER — OFFICE VISIT (OUTPATIENT)
Dept: SURGERY | Facility: CLINIC | Age: 82
End: 2017-10-12

## 2017-10-12 VITALS
HEIGHT: 61 IN | BODY MASS INDEX: 18.5 KG/M2 | DIASTOLIC BLOOD PRESSURE: 60 MMHG | HEART RATE: 78 BPM | WEIGHT: 98 LBS | RESPIRATION RATE: 12 BRPM | SYSTOLIC BLOOD PRESSURE: 112 MMHG

## 2017-10-12 DIAGNOSIS — S12.100G CLOSED DISPLACED FRACTURE OF SECOND CERVICAL VERTEBRA WITH DELAYED HEALING, UNSPECIFIED FRACTURE MORPHOLOGY, SUBSEQUENT ENCOUNTER: Primary | ICD-10-CM

## 2017-10-12 DIAGNOSIS — S12.100G CLOSED DISPLACED FRACTURE OF SECOND CERVICAL VERTEBRA WITH DELAYED HEALING, UNSPECIFIED FRACTURE MORPHOLOGY, SUBSEQUENT ENCOUNTER: ICD-10-CM

## 2017-10-12 PROCEDURE — 72040 X-RAY EXAM NECK SPINE 2-3 VW: CPT | Performed by: NEUROLOGICAL SURGERY

## 2017-10-12 PROCEDURE — 99213 OFFICE O/P EST LOW 20 MIN: CPT | Performed by: PHYSICIAN ASSISTANT

## 2017-10-12 NOTE — PROGRESS NOTES
Neurosurgery Clinic Visit  10/12/2017    Karolyn Almita PCP:  Nam Blank MD    1924 MRN [id number]     HISTORY OF PRESENT ILLNESS:  Karolyn Recio is a(n) 80year old female who is here for follow-up of C2 fracture.   She is her

## 2017-10-12 NOTE — PATIENT INSTRUCTIONS
Refill policies:    • Allow 2-3 business days for refills; controlled substances may take longer.   • Contact your pharmacy at least 5 days prior to running out of medication and have them send an electronic request or submit request through the [facility] have a procedure or additional testing performed. [facility]) will contact your insurance carrier to obtain pre-certification or prior authorization.     Unfortunately, LADY has seen an increase in denial of payment even though the p

## 2017-11-17 ENCOUNTER — APPOINTMENT (OUTPATIENT)
Dept: GENERAL RADIOLOGY | Facility: HOSPITAL | Age: 82
End: 2017-11-17
Attending: EMERGENCY MEDICINE
Payer: MEDICARE

## 2017-11-17 ENCOUNTER — HOSPITAL ENCOUNTER (OUTPATIENT)
Facility: HOSPITAL | Age: 82
Setting detail: OBSERVATION
Discharge: HOME | End: 2017-11-18
Attending: EMERGENCY MEDICINE | Admitting: INTERNAL MEDICINE
Payer: MEDICARE

## 2017-11-17 DIAGNOSIS — R55 SYNCOPE, UNSPECIFIED SYNCOPE TYPE: Primary | ICD-10-CM

## 2017-11-17 PROBLEM — S12.101A CLOSED NONDISPLACED FRACTURE OF SECOND CERVICAL VERTEBRA (HCC): Status: ACTIVE | Noted: 2017-09-05

## 2017-11-17 PROCEDURE — 71010 XR CHEST AP PORTABLE  (CPT=71010): CPT | Performed by: EMERGENCY MEDICINE

## 2017-11-17 PROCEDURE — 99220 INITIAL OBSERVATION CARE,LEVL III: CPT | Performed by: HOSPITALIST

## 2017-11-17 RX ORDER — DEXTROSE MONOHYDRATE 25 G/50ML
50 INJECTION, SOLUTION INTRAVENOUS
Status: DISCONTINUED | OUTPATIENT
Start: 2017-11-17 | End: 2017-11-18

## 2017-11-17 RX ORDER — MELATONIN
100 DAILY
Status: DISCONTINUED | OUTPATIENT
Start: 2017-11-18 | End: 2017-11-18

## 2017-11-17 RX ORDER — ACETAMINOPHEN 325 MG/1
650 TABLET ORAL EVERY 6 HOURS PRN
Status: DISCONTINUED | OUTPATIENT
Start: 2017-11-17 | End: 2017-11-18

## 2017-11-17 RX ORDER — ONDANSETRON 2 MG/ML
4 INJECTION INTRAMUSCULAR; INTRAVENOUS ONCE
Status: COMPLETED | OUTPATIENT
Start: 2017-11-17 | End: 2017-11-17

## 2017-11-17 RX ORDER — SODIUM CHLORIDE 9 MG/ML
125 INJECTION, SOLUTION INTRAVENOUS CONTINUOUS
Status: DISCONTINUED | OUTPATIENT
Start: 2017-11-17 | End: 2017-11-18

## 2017-11-17 RX ORDER — ENOXAPARIN SODIUM 100 MG/ML
30 INJECTION SUBCUTANEOUS DAILY
Status: DISCONTINUED | OUTPATIENT
Start: 2017-11-18 | End: 2017-11-18

## 2017-11-17 RX ORDER — SODIUM CHLORIDE 9 MG/ML
INJECTION, SOLUTION INTRAVENOUS CONTINUOUS
Status: DISCONTINUED | OUTPATIENT
Start: 2017-11-18 | End: 2017-11-18

## 2017-11-17 RX ORDER — ATORVASTATIN CALCIUM 10 MG/1
10 TABLET, FILM COATED ORAL NIGHTLY
Status: DISCONTINUED | OUTPATIENT
Start: 2017-11-18 | End: 2017-11-18

## 2017-11-17 RX ORDER — ONDANSETRON 2 MG/ML
4 INJECTION INTRAMUSCULAR; INTRAVENOUS EVERY 4 HOURS PRN
Status: DISCONTINUED | OUTPATIENT
Start: 2017-11-17 | End: 2017-11-18

## 2017-11-17 RX ORDER — CLOPIDOGREL BISULFATE 75 MG/1
75 TABLET ORAL DAILY
Status: DISCONTINUED | OUTPATIENT
Start: 2017-11-18 | End: 2017-11-18

## 2017-11-17 RX ORDER — DONEPEZIL HYDROCHLORIDE 10 MG/1
10 TABLET, FILM COATED ORAL NIGHTLY
Status: DISCONTINUED | OUTPATIENT
Start: 2017-11-18 | End: 2017-11-18

## 2017-11-17 RX ORDER — FOLIC ACID 1 MG/1
1 TABLET ORAL DAILY
Status: DISCONTINUED | OUTPATIENT
Start: 2017-11-18 | End: 2017-11-18

## 2017-11-18 ENCOUNTER — HOSPITAL ENCOUNTER (OUTPATIENT)
Dept: CT IMAGING | Facility: HOSPITAL | Age: 82
Discharge: HOME | End: 2017-11-18
Attending: PHYSICIAN ASSISTANT
Payer: MEDICARE

## 2017-11-18 VITALS
BODY MASS INDEX: 19 KG/M2 | RESPIRATION RATE: 18 BRPM | DIASTOLIC BLOOD PRESSURE: 43 MMHG | HEART RATE: 95 BPM | TEMPERATURE: 98 F | SYSTOLIC BLOOD PRESSURE: 112 MMHG | OXYGEN SATURATION: 98 % | WEIGHT: 98.13 LBS

## 2017-11-18 DIAGNOSIS — S12.100G CLOSED DISPLACED FRACTURE OF SECOND CERVICAL VERTEBRA WITH DELAYED HEALING, UNSPECIFIED FRACTURE MORPHOLOGY, SUBSEQUENT ENCOUNTER: ICD-10-CM

## 2017-11-18 PROCEDURE — 72125 CT NECK SPINE W/O DYE: CPT | Performed by: PHYSICIAN ASSISTANT

## 2017-11-18 NOTE — SLP NOTE
ADULT SWALLOWING EVALUATION    ASSESSMENT    ASSESSMENT/OVERALL IMPRESSION:  B/S swallow eval completed upon receipt of MD order (history of mod diet).   Per MD note  HPI 79-year-old female that comes in the hospital for evaluation for nausea and vomiting a single sips. No straws    Aspiration Precautions: Upright position; Slow rate;Small bites and sips; No straw  Medication Administration Recommendations: Crushed in puree  Treatment Plan/Recommendations: Dysphagia therapy  Discharge Recommendations/Plan: Und liquids;Puree; Soft solid  Method of Presentation: Self presentation;Cup  Patient Positioning: Upright;Standard chair    Oral Phase of Swallow: Within Functional Limits    Pharyngeal Phase of Swallow:  Within Functional Limits     (Please note: Silent aspira

## 2017-11-18 NOTE — PLAN OF CARE
Assumed care of pt @ 2300. AOx3, forgetful. [place] speaking only,  line used. Bed alarm in place for safety. On Ra, denies SOB. C collar in place. NSR on tele. Pt denies pain. IVF infusing per order.  Plan for labs in am. PT/OT/Speech consult in

## 2017-11-18 NOTE — HOME CARE LIAISON
Patient is current with [facility]  Please enter an order to resume when appropriate, thank you

## 2017-11-18 NOTE — PROGRESS NOTES
Tele & iv site removed (catheter intact). Discharge paperwork and education reviewed with son at bedside. All questions answered. Transported via W/C to [facility] as the patient has an outpatient CT scheduled at 1400; driven home afterwards by son.

## 2017-11-18 NOTE — ED INITIAL ASSESSMENT (HPI)
Patient Icelandic speaking only. Family not present at this time. Per EMS patient had one emesis tonight medicated with 4 mg Zofran. And EMS sts unsure of syncopal episode while in the bathroom.

## 2017-11-18 NOTE — PAYOR COMM NOTE
--------------  ADMISSION REVIEW     Payor: MEDICARE PART B ONLY  Subscriber #:  [id number]         Admitting Physician: Diego Escalante MD  Attending Physician:  Lisa Cruz MD  Primary Care Physician: Chemo Lopez MD    REVIEW DOCUMENTATION:     E air)[MP.2]    Current:[MP.1]/46   Pulse 68   Temp 98.9 °F (37.2 °C) (Temporal)   Resp 16   SpO2 99%[MP.2]         Physical Exam    HEENT : NCAT, EOMI, PEERL, throat clear, neck with hard c-collar in place, no JVD, trachea midline, No LAD  Heart: S1S2 COMPARISON:  [facility] , XR CHEST PA + LAT CHEST (CZN=[id number]), 9/05/2017, 12:21. INDICATIONS:  possible syncope/vomiting     CONCLUSION:  1. Hyperinflated lungs consistent with obstructive pulmonary disease. 2. Coronary artery stent identified.  3. Calcified m with[MD.1] was near the toilet after urinating. She became dizzy and had a brief syncopal event for approximately 1 minute. This was witnessed by the son. The patient did have an episode with nausea and emesis with one episode of diarrhea.   There are no tablet (100 mg total) by mouth daily. Disp: 30 tablet Rfl: 0   Calcium Carbonate-Vitamin D 250-125 MG-UNIT Oral Tab Take 1 tablet by mouth 2 (two) times daily with meals.  Disp: 60 tablet Rfl: 0   nystatin-triamcinolone 401299-7.7 UNIT/GM-% External Cream A urinating. Son witnessed event. No fall or had trauma. Poor oral intake over last 24 hours  1. Check orthostatic  2. IVF  3.  Pending clinical course will consider ECHO and further w/u but truly suspect due to vagal event and dehydration with n/v/d  2. Re

## 2017-11-18 NOTE — DISCHARGE SUMMARY
[facility] HOSPITAL[facility]  DISCHARGE SUMMARY     Varsha Mcnair Patient Status:  Observation    1924 MRN [id number]   [facility] 2NE-A Attending No att. providers found   Hosp Day # 0 PCP Ruchi Burk MD     Date of Admission:  toilet after urinating. Patient became dizzy. She was admitted and ruled out for any arrhythmias. She did not have any further episodes during her hospitalization. She is placed in IV fluid hydration. She was not orthostatic.   His symptoms are likely nightly. Refills:  0     Thiamine HCl 100 MG Tabs      Take 1 tablet (100 mg total) by mouth daily. Quantity:  30 tablet  Refills:  0            Follow-up appointment:   No follow-up provider specified.     Vital signs:  Temp:  [98 °F (36.7 °C)-98.9 °F

## 2017-11-18 NOTE — PROGRESS NOTES
Orthostatic Vital Signs:       11/18/17 0956 11/18/17 0959 11/18/17 1002   Vital Signs   Pulse 87 90 95   /40 110/38 112/43   Patient Position Lying Sitting Standing

## 2017-11-18 NOTE — PROGRESS NOTES
Attempted to use Language Line as well as called the patient's son in order to interpret English to [place]; the patient was unable to hear and/or understand either person.

## 2017-11-18 NOTE — ED PROVIDER NOTES
Patient Seen in: [facility] Emergency Department    History   Patient presents with:  Syncope (cardiovascular, neurologic)  Nausea/Vomiting/Diarrhea (gastrointestinal)    Stated Complaint: possible syncope/vomiting    HPI    40-year-old female that noted    ED Course     Labs Reviewed   COMP METABOLIC PANEL (14) - Abnormal; Notable for the following:        Result Value    Glucose 107 (*)     GFR 59 (*)     AST 12 (*)     Sodium 131 (*)     Chloride 100 (*)     All other components within normal limi CONCLUSION:  1. Hyperinflated lungs consistent with obstructive pulmonary disease. 2. Coronary artery stent identified. 3. Calcified mitral annulus. 4. No infiltrate or pulmonary edema. Mild left basilar atelectasis or scarring.     Dictated by: Cornelia Gray

## 2017-11-18 NOTE — H&P
[facility] HOSPITALIST  History and Physical     [facility] Patient Status:  Emergency    1924 MRN [id number]   Location [address] [facility] Attending Vonnie Carlos MD   Hosp Day # 0 PCP Nkechi Spangler MD     Chief Co Units into the skin TID CC and HS. Disp: 5 pen Rfl: 0   acetaminophen 325 MG Oral Tab Take 2 tablets (650 mg total) by mouth every 6 (six) hours as needed. Disp: 30 tablet Rfl: 0   folic acid 1 MG Oral Tab Take 1 tablet (1 mg total) by mouth daily.  Disp: 3 CO2  25.0   ALKPHO  85   AST  12*   ALT  20   BILT  0.6   TP  7.1     No results for input(s): PTP, INR in the last 72 hours. Recent Labs   Lab  11/17/17 1917   TROP  <0.046     Imaging: Imaging data reviewed in Epic. ASSESSMENT / PLAN:   1.  Brief sy

## 2017-11-18 NOTE — PLAN OF CARE
Aspiration Precautions Maintained  High Fall Risk Precautions Maintained    Comments: Pt is awake, sitting up in chair.   Unable to assess orientation d/t language barrier & dementia (patient is unable to understand that the  is on the phone; pat volume within ordered parameters to optimize cerebral perfusion and minimize risk of hemorrhage  - Monitor temperature, glucose, and sodium.  Initiate appropriate interventions as ordered  Outcome: Progressing      Problem: CARDIOVASCULAR - ADULT  Goal: Ashish Perry patient/family in tolerated functional activity level and precautions during self-care  Outcome: Progressing      Problem: Impaired Swallowing  Goal: Minimize aspiration risk  Interventions:  - Patient should be alert and upright for all feedings (90 degre style  - Implement communication aides and strategies  - Use visual cues when possible  - Listen attentively, be patient, do not interrupt  - Minimize distractions  - Allow time for understanding and response  - Establish method for patient to ask for assi

## 2017-11-18 NOTE — PROGRESS NOTES
[facility] Pharmacy Note: Renal dose adjustment for Enoxaparin (Lovenox)  Devon Trevino has been prescribed Enoxaparin (Lovenox)  40 mg subcutaneously every 24 hours. Estimated Creatinine Clearance: 29 mL/min (based on SCr of 0.85 mg/dL).     Her calc

## 2017-11-21 ENCOUNTER — OFFICE VISIT (OUTPATIENT)
Dept: SURGERY | Facility: CLINIC | Age: 82
End: 2017-11-21

## 2017-11-21 VITALS — HEART RATE: 75 BPM | DIASTOLIC BLOOD PRESSURE: 50 MMHG | SYSTOLIC BLOOD PRESSURE: 120 MMHG

## 2017-11-21 DIAGNOSIS — S12.100G CLOSED DISPLACED FRACTURE OF SECOND CERVICAL VERTEBRA WITH DELAYED HEALING, UNSPECIFIED FRACTURE MORPHOLOGY, SUBSEQUENT ENCOUNTER: Primary | ICD-10-CM

## 2017-11-21 PROCEDURE — 99213 OFFICE O/P EST LOW 20 MIN: CPT | Performed by: PHYSICIAN ASSISTANT

## 2017-11-21 RX ORDER — MULTIVIT-MIN/IRON FUM/FOLIC AC 7.5 MG-4
1 TABLET ORAL DAILY
COMMUNITY

## 2017-11-21 NOTE — PROGRESS NOTES
Patient son reports on Friday patient had episode of nausea/vomiting/loc and was admitted for observation and discharged the next day. Denies any fall or injury at that time. No complaints with neck, just rubbing from brace.

## 2017-11-21 NOTE — PATIENT INSTRUCTIONS
Refill policies:    • Allow 2-3 business days for refills; controlled substances may take longer.   • Contact your pharmacy at least 5 days prior to running out of medication and have them send an electronic request or submit request through the [facility] have a procedure or additional testing performed. [facility]) will contact your insurance carrier to obtain pre-certification or prior authorization.     Unfortunately, LADY has seen an increase in denial of payment even though the p

## 2017-11-21 NOTE — PROGRESS NOTES
Neurosurgery Clinic Visit  2017    Allison Zavala PCP:  Peri Arriaga MD    1924 MRN [id number]     HISTORY OF PRESENT ILLNESS:  Allison Zavala is a(n) 80year old female who is here for follow-up of C2 fracture.   She is he

## 2018-03-01 ENCOUNTER — HOSPITAL ENCOUNTER (OUTPATIENT)
Dept: GENERAL RADIOLOGY | Facility: HOSPITAL | Age: 83
Discharge: HOME | End: 2018-03-01
Attending: PHYSICIAN ASSISTANT
Payer: MEDICARE

## 2018-03-01 ENCOUNTER — OFFICE VISIT (OUTPATIENT)
Dept: SURGERY | Facility: CLINIC | Age: 83
End: 2018-03-01

## 2018-03-01 VITALS — DIASTOLIC BLOOD PRESSURE: 50 MMHG | SYSTOLIC BLOOD PRESSURE: 120 MMHG | HEART RATE: 72 BPM

## 2018-03-01 DIAGNOSIS — S12.100S TRAUMATIC CLOSED FRACTURE OF C2 VERTEBRA WITH MINIMAL DISPLACEMENT, SEQUELA: Primary | ICD-10-CM

## 2018-03-01 DIAGNOSIS — S12.100G CLOSED DISPLACED FRACTURE OF SECOND CERVICAL VERTEBRA WITH DELAYED HEALING, UNSPECIFIED FRACTURE MORPHOLOGY, SUBSEQUENT ENCOUNTER: ICD-10-CM

## 2018-03-01 PROCEDURE — 99212 OFFICE O/P EST SF 10 MIN: CPT | Performed by: PHYSICIAN ASSISTANT

## 2018-03-01 PROCEDURE — 72040 X-RAY EXAM NECK SPINE 2-3 VW: CPT | Performed by: PHYSICIAN ASSISTANT

## 2018-03-01 NOTE — PATIENT INSTRUCTIONS
Refill policies:    • Allow 2-3 business days for refills; controlled substances may take longer.   • Contact your pharmacy at least 5 days prior to running out of medication and have them send an electronic request or submit request through the [facility] recommended that you have a procedure or additional testing performed. [facility]) will contact your insurance carrier to obtain pre-certification or prior authorization.     Unfortunately, [facility] has seen an increase in denial of paym

## 2018-03-01 NOTE — PROGRESS NOTES
Neurosurgery Clinic Visit  3/1/2018    Karolyn Recio PCP:  Carl Gamez MD    1924 MRN [id number]       CC: C2 Fx    HPI:    Arianne Walker is here with family for follow up xray. Family denies hearing any complaints of neck pain.   She has been PM

## 2018-03-01 NOTE — PROGRESS NOTES
Pt is here to follow up after imaging  Family state they have not been giving pt some of her medications due to swallowing problems. Family state they fear she might choke on these.

## 2018-06-06 ENCOUNTER — TELEPHONE (OUTPATIENT)
Dept: SURGERY | Facility: CLINIC | Age: 83
End: 2018-06-06

## 2018-11-03 VITALS
HEART RATE: 68 BPM | OXYGEN SATURATION: 95 % | TEMPERATURE: 98.2 F | HEIGHT: 59 IN | BODY MASS INDEX: 20.96 KG/M2 | DIASTOLIC BLOOD PRESSURE: 52 MMHG | SYSTOLIC BLOOD PRESSURE: 118 MMHG | WEIGHT: 104 LBS | RESPIRATION RATE: 16 BRPM

## 2018-11-04 VITALS
HEART RATE: 78 BPM | WEIGHT: 107 LBS | HEIGHT: 59 IN | BODY MASS INDEX: 21.57 KG/M2 | SYSTOLIC BLOOD PRESSURE: 110 MMHG | TEMPERATURE: 97.9 F | DIASTOLIC BLOOD PRESSURE: 52 MMHG | OXYGEN SATURATION: 95 % | RESPIRATION RATE: 16 BRPM

## 2019-03-20 ENCOUNTER — LAB REQUISITION (OUTPATIENT)
Dept: LAB | Facility: HOSPITAL | Age: 84
End: 2019-03-20
Attending: SURGERY
Payer: MEDICARE

## 2019-03-20 DIAGNOSIS — R82.90 ABNORMAL FINDING IN URINE: ICD-10-CM

## 2019-03-20 LAB
BILIRUB UR QL STRIP.AUTO: NEGATIVE
COLOR UR AUTO: YELLOW
GLUCOSE UR STRIP.AUTO-MCNC: NEGATIVE MG/DL
KETONES UR STRIP.AUTO-MCNC: NEGATIVE MG/DL
NITRITE UR QL STRIP.AUTO: NEGATIVE
PH UR STRIP.AUTO: 6 [PH] (ref 4.5–8)
PROT UR STRIP.AUTO-MCNC: NEGATIVE MG/DL
RBC #/AREA URNS AUTO: >10 /HPF
SP GR UR STRIP.AUTO: 1.01 (ref 1–1.03)
UROBILINOGEN UR STRIP.AUTO-MCNC: <2 MG/DL
WBC #/AREA URNS AUTO: >50 /HPF
WBC CLUMPS UR QL AUTO: PRESENT

## 2019-03-20 PROCEDURE — 87086 URINE CULTURE/COLONY COUNT: CPT | Performed by: SURGERY

## 2019-03-20 PROCEDURE — 87077 CULTURE AEROBIC IDENTIFY: CPT | Performed by: SURGERY

## 2019-03-20 PROCEDURE — 81001 URINALYSIS AUTO W/SCOPE: CPT | Performed by: SURGERY

## 2019-04-03 ENCOUNTER — LAB REQUISITION (OUTPATIENT)
Dept: LAB | Facility: HOSPITAL | Age: 84
End: 2019-04-03
Payer: MEDICARE

## 2019-04-03 DIAGNOSIS — N39.0 URINARY TRACT INFECTION: ICD-10-CM

## 2019-04-03 PROCEDURE — 87077 CULTURE AEROBIC IDENTIFY: CPT | Performed by: SURGERY

## 2019-04-03 PROCEDURE — 87186 SC STD MICRODIL/AGAR DIL: CPT | Performed by: SURGERY

## 2019-04-03 PROCEDURE — 81001 URINALYSIS AUTO W/SCOPE: CPT | Performed by: SURGERY

## 2019-04-03 PROCEDURE — 87086 URINE CULTURE/COLONY COUNT: CPT | Performed by: SURGERY

## 2019-05-01 ENCOUNTER — LAB REQUISITION (OUTPATIENT)
Dept: LAB | Facility: HOSPITAL | Age: 84
End: 2019-05-01
Payer: MEDICARE

## 2019-05-01 DIAGNOSIS — N39.0 URINARY TRACT INFECTION: ICD-10-CM

## 2019-05-01 PROCEDURE — 87086 URINE CULTURE/COLONY COUNT: CPT | Performed by: SURGERY

## 2019-05-01 PROCEDURE — 87077 CULTURE AEROBIC IDENTIFY: CPT | Performed by: SURGERY

## 2019-05-01 PROCEDURE — 87186 SC STD MICRODIL/AGAR DIL: CPT | Performed by: SURGERY

## 2019-05-01 PROCEDURE — 81001 URINALYSIS AUTO W/SCOPE: CPT | Performed by: SURGERY

## 2019-05-23 ENCOUNTER — LAB REQUISITION (OUTPATIENT)
Dept: LAB | Facility: HOSPITAL | Age: 84
End: 2019-05-23
Payer: MEDICARE

## 2019-05-23 DIAGNOSIS — I10 ESSENTIAL (PRIMARY) HYPERTENSION: ICD-10-CM

## 2019-05-23 DIAGNOSIS — E11.9 TYPE 2 DIABETES MELLITUS WITHOUT COMPLICATIONS (HCC): ICD-10-CM

## 2019-05-23 PROCEDURE — 87086 URINE CULTURE/COLONY COUNT: CPT | Performed by: SURGERY

## 2019-05-23 PROCEDURE — 85025 COMPLETE CBC W/AUTO DIFF WBC: CPT | Performed by: SURGERY

## 2019-05-23 PROCEDURE — 81001 URINALYSIS AUTO W/SCOPE: CPT | Performed by: SURGERY

## 2019-05-23 PROCEDURE — 80053 COMPREHEN METABOLIC PANEL: CPT | Performed by: SURGERY

## 2019-05-29 ENCOUNTER — LAB REQUISITION (OUTPATIENT)
Dept: LAB | Facility: HOSPITAL | Age: 84
End: 2019-05-29
Payer: MEDICARE

## 2019-05-29 DIAGNOSIS — N39.0 URINARY TRACT INFECTION: ICD-10-CM

## 2019-05-29 PROCEDURE — 87086 URINE CULTURE/COLONY COUNT: CPT | Performed by: SURGERY

## 2019-05-29 PROCEDURE — 87186 SC STD MICRODIL/AGAR DIL: CPT | Performed by: SURGERY

## 2019-05-29 PROCEDURE — 81001 URINALYSIS AUTO W/SCOPE: CPT | Performed by: SURGERY

## 2019-05-29 PROCEDURE — 87088 URINE BACTERIA CULTURE: CPT | Performed by: SURGERY

## 2020-01-01 ENCOUNTER — LAB REQUISITION (OUTPATIENT)
Dept: LAB | Facility: HOSPITAL | Age: 85
End: 2020-01-01
Payer: MEDICARE

## 2020-01-01 ENCOUNTER — LAB REQUISITION (OUTPATIENT)
Dept: LAB | Age: 85
End: 2020-01-01
Payer: MEDICARE

## 2020-01-01 DIAGNOSIS — E87.1 HYPO-OSMOLALITY AND HYPONATREMIA: ICD-10-CM

## 2020-01-01 DIAGNOSIS — R00.8 OTHER ABNORMALITIES OF HEART BEAT: ICD-10-CM
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
## 2020-01-01 PROCEDURE — 87186 SC STD MICRODIL/AGAR DIL: CPT | Performed by: ANESTHESIOLOGY

## 2020-01-01 PROCEDURE — 81001 URINALYSIS AUTO W/SCOPE: CPT | Performed by: ANESTHESIOLOGY

## 2020-01-01 PROCEDURE — 85025 COMPLETE CBC W/AUTO DIFF WBC: CPT | Performed by: ANESTHESIOLOGY

## 2020-01-01 PROCEDURE — 87077 CULTURE AEROBIC IDENTIFY: CPT | Performed by: ANESTHESIOLOGY

## 2020-01-01 PROCEDURE — 87086 URINE CULTURE/COLONY COUNT: CPT | Performed by: ANESTHESIOLOGY

## 2020-01-01 PROCEDURE — 80048 BASIC METABOLIC PNL TOTAL CA: CPT | Performed by: ANESTHESIOLOGY

## 2020-02-10 ENCOUNTER — HOSPITAL ENCOUNTER (INPATIENT)
Facility: HOSPITAL | Age: 85
LOS: 4 days | Discharge: HOME HEALTH | DRG: 689 | End: 2020-02-15
Attending: EMERGENCY MEDICINE | Admitting: INTERNAL MEDICINE
Payer: MEDICARE

## 2020-02-10 ENCOUNTER — APPOINTMENT (OUTPATIENT)
Dept: GENERAL RADIOLOGY | Facility: HOSPITAL | Age: 85
DRG: 689 | End: 2020-02-10
Attending: PHYSICIAN ASSISTANT
Payer: MEDICARE

## 2020-02-10 ENCOUNTER — APPOINTMENT (OUTPATIENT)
Dept: CT IMAGING | Facility: HOSPITAL | Age: 85
DRG: 689 | End: 2020-02-10
Attending: HOSPITALIST
Payer: MEDICARE

## 2020-02-10 DIAGNOSIS — J18.9 PNEUMONIA OF RIGHT LOWER LOBE DUE TO INFECTIOUS ORGANISM: ICD-10-CM

## 2020-02-10 DIAGNOSIS — E87.1 HYPONATREMIA: Primary | ICD-10-CM

## 2020-02-10 DIAGNOSIS — N30.90 CYSTITIS: ICD-10-CM

## 2020-02-10 PROBLEM — R79.89 AZOTEMIA: Status: ACTIVE | Noted: 2020-02-10

## 2020-02-10 PROBLEM — R73.9 HYPERGLYCEMIA: Status: ACTIVE | Noted: 2020-02-10

## 2020-02-10 PROBLEM — D72.829 LEUKOCYTOSIS: Status: ACTIVE | Noted: 2020-02-10

## 2020-02-10 LAB
ALBUMIN SERPL-MCNC: 3.6 G/DL (ref 3.4–5)
ALBUMIN/GLOB SERPL: 0.8 {RATIO} (ref 1–2)
ALP LIVER SERPL-CCNC: 130 U/L (ref 55–142)
ALT SERPL-CCNC: 13 U/L (ref 13–56)
ANION GAP SERPL CALC-SCNC: 4 MMOL/L (ref 0–18)
AST SERPL-CCNC: 13 U/L (ref 15–37)
BASOPHILS # BLD AUTO: 0.03 X10(3) UL (ref 0–0.2)
BASOPHILS NFR BLD AUTO: 0.2 %
BILIRUB SERPL-MCNC: 0.9 MG/DL (ref 0.1–2)
BILIRUB UR QL STRIP.AUTO: NEGATIVE
BUN BLD-MCNC: 23 MG/DL (ref 7–18)
BUN/CREAT SERPL: 21.7 (ref 10–20)
CALCIUM BLD-MCNC: 10.9 MG/DL (ref 8.5–10.1)
CHLORIDE SERPL-SCNC: 86 MMOL/L (ref 98–112)
CO2 SERPL-SCNC: 30 MMOL/L (ref 21–32)
COLOR UR AUTO: YELLOW
CREAT BLD-MCNC: 1.06 MG/DL (ref 0.55–1.02)
DEPRECATED RDW RBC AUTO: 39 FL (ref 35.1–46.3)
EOSINOPHIL # BLD AUTO: 0.01 X10(3) UL (ref 0–0.7)
EOSINOPHIL NFR BLD AUTO: 0.1 %
ERYTHROCYTE [DISTWIDTH] IN BLOOD BY AUTOMATED COUNT: 13.1 % (ref 11–15)
GLOBULIN PLAS-MCNC: 4.3 G/DL (ref 2.8–4.4)
GLUCOSE BLD-MCNC: 229 MG/DL (ref 70–99)
GLUCOSE UR STRIP.AUTO-MCNC: NEGATIVE MG/DL
HCT VFR BLD AUTO: 39.9 % (ref 35–48)
HGB BLD-MCNC: 12.9 G/DL (ref 12–16)
IMM GRANULOCYTES # BLD AUTO: 0.06 X10(3) UL (ref 0–1)
IMM GRANULOCYTES NFR BLD: 0.4 %
KETONES UR STRIP.AUTO-MCNC: NEGATIVE MG/DL
LACTATE SERPL-SCNC: 1.8 MMOL/L (ref 0.4–2)
LYMPHOCYTES # BLD AUTO: 0.72 X10(3) UL (ref 1–4)
LYMPHOCYTES NFR BLD AUTO: 4.8 %
M PROTEIN MFR SERPL ELPH: 7.9 G/DL (ref 6.4–8.2)
MCH RBC QN AUTO: 26.7 PG (ref 26–34)
MCHC RBC AUTO-ENTMCNC: 32.3 G/DL (ref 31–37)
MCV RBC AUTO: 82.4 FL (ref 80–100)
MONOCYTES # BLD AUTO: 1.11 X10(3) UL (ref 0.1–1)
MONOCYTES NFR BLD AUTO: 7.4 %
NEUTROPHILS # BLD AUTO: 12.97 X10 (3) UL (ref 1.5–7.7)
NEUTROPHILS # BLD AUTO: 12.97 X10(3) UL (ref 1.5–7.7)
NEUTROPHILS NFR BLD AUTO: 87.1 %
NITRITE UR QL STRIP.AUTO: POSITIVE
OSMOLALITY SERPL CALC.SUM OF ELEC: 261 MOSM/KG (ref 275–295)
PH UR STRIP.AUTO: 5 [PH] (ref 4.5–8)
PLATELET # BLD AUTO: 244 10(3)UL (ref 150–450)
POTASSIUM SERPL-SCNC: 4.9 MMOL/L (ref 3.5–5.1)
PROT UR STRIP.AUTO-MCNC: 100 MG/DL
RBC # BLD AUTO: 4.84 X10(6)UL (ref 3.8–5.3)
SODIUM SERPL-SCNC: 120 MMOL/L (ref 136–145)
SP GR UR STRIP.AUTO: 1.01 (ref 1–1.03)
UROBILINOGEN UR STRIP.AUTO-MCNC: <2 MG/DL
WBC # BLD AUTO: 14.9 X10(3) UL (ref 4–11)
WBC #/AREA URNS AUTO: >50 /HPF
WBC CLUMPS UR QL AUTO: PRESENT

## 2020-02-10 PROCEDURE — 99223 1ST HOSP IP/OBS HIGH 75: CPT | Performed by: HOSPITALIST

## 2020-02-10 PROCEDURE — 71045 X-RAY EXAM CHEST 1 VIEW: CPT | Performed by: PHYSICIAN ASSISTANT

## 2020-02-10 PROCEDURE — 70450 CT HEAD/BRAIN W/O DYE: CPT | Performed by: HOSPITALIST

## 2020-02-10 RX ORDER — SODIUM CHLORIDE 9 MG/ML
1000 INJECTION, SOLUTION INTRAVENOUS ONCE
Status: COMPLETED | OUTPATIENT
Start: 2020-02-10 | End: 2020-02-10

## 2020-02-11 PROBLEM — J18.9 PNEUMONIA OF RIGHT LOWER LOBE DUE TO INFECTIOUS ORGANISM: Status: ACTIVE | Noted: 2020-02-11

## 2020-02-11 PROBLEM — N30.90 CYSTITIS: Status: ACTIVE | Noted: 2020-02-11

## 2020-02-11 LAB
ANION GAP SERPL CALC-SCNC: 2 MMOL/L (ref 0–18)
BUN BLD-MCNC: 18 MG/DL (ref 7–18)
BUN/CREAT SERPL: 24.7 (ref 10–20)
CALCIUM BLD-MCNC: 9.3 MG/DL (ref 8.5–10.1)
CHLORIDE SERPL-SCNC: 97 MMOL/L (ref 98–112)
CO2 SERPL-SCNC: 27 MMOL/L (ref 21–32)
CORTIS SERPL-MCNC: 18 UG/DL
CREAT BLD-MCNC: 0.73 MG/DL (ref 0.55–1.02)
CREAT UR-SCNC: 50.4 MG/DL
GLUCOSE BLD-MCNC: 138 MG/DL (ref 70–99)
OSMOLALITY SERPL CALC.SUM OF ELEC: 266 MOSM/KG (ref 275–295)
OSMOLALITY UR: 348 MOSM/KG (ref 300–1300)
POTASSIUM SERPL-SCNC: 4.2 MMOL/L (ref 3.5–5.1)
SODIUM SERPL-SCNC: 126 MMOL/L (ref 136–145)
SODIUM SERPL-SCNC: 19 MMOL/L
T4 FREE SERPL-MCNC: 1.2 NG/DL (ref 0.8–1.7)
TSI SER-ACNC: 0.85 MIU/ML (ref 0.36–3.74)
URATE SERPL-MCNC: 3.8 MG/DL (ref 2.6–6)

## 2020-02-11 PROCEDURE — 99222 1ST HOSP IP/OBS MODERATE 55: CPT | Performed by: INTERNAL MEDICINE

## 2020-02-11 PROCEDURE — 99232 SBSQ HOSP IP/OBS MODERATE 35: CPT | Performed by: HOSPITALIST

## 2020-02-11 RX ORDER — ONDANSETRON 2 MG/ML
4 INJECTION INTRAMUSCULAR; INTRAVENOUS EVERY 6 HOURS PRN
Status: DISCONTINUED | OUTPATIENT
Start: 2020-02-11 | End: 2020-02-15

## 2020-02-11 RX ORDER — ACETAMINOPHEN 325 MG/1
650 TABLET ORAL EVERY 6 HOURS PRN
Status: DISCONTINUED | OUTPATIENT
Start: 2020-02-11 | End: 2020-02-15

## 2020-02-11 RX ORDER — ATORVASTATIN CALCIUM 10 MG/1
10 TABLET, FILM COATED ORAL NIGHTLY
Status: DISCONTINUED | OUTPATIENT
Start: 2020-02-11 | End: 2020-02-15

## 2020-02-11 RX ORDER — SODIUM CHLORIDE 9 MG/ML
INJECTION, SOLUTION INTRAVENOUS CONTINUOUS
Status: ACTIVE | OUTPATIENT
Start: 2020-02-11 | End: 2020-02-11

## 2020-02-11 RX ORDER — ENOXAPARIN SODIUM 100 MG/ML
40 INJECTION SUBCUTANEOUS NIGHTLY
Status: DISCONTINUED | OUTPATIENT
Start: 2020-02-11 | End: 2020-02-15

## 2020-02-11 RX ORDER — ENOXAPARIN SODIUM 100 MG/ML
30 INJECTION SUBCUTANEOUS DAILY
Status: DISCONTINUED | OUTPATIENT
Start: 2020-02-11 | End: 2020-02-11

## 2020-02-11 RX ORDER — SODIUM CHLORIDE 9 MG/ML
INJECTION, SOLUTION INTRAVENOUS CONTINUOUS
Status: DISCONTINUED | OUTPATIENT
Start: 2020-02-11 | End: 2020-02-15

## 2020-02-11 RX ORDER — CLOPIDOGREL BISULFATE 75 MG/1
75 TABLET ORAL DAILY
Status: DISCONTINUED | OUTPATIENT
Start: 2020-02-11 | End: 2020-02-15

## 2020-02-11 NOTE — PLAN OF CARE
Problem: Impaired Swallowing  Goal: Minimize aspiration risk  Description  Interventions:  - Patient should be alert and upright for all feedings (90 degrees preferred)  - Offer food and liquids at a slow rate  - No straws  - Encourage small bites of norman

## 2020-02-11 NOTE — ED NOTES
Son, Alberto Cano, updated and given patient's room number. He would like to be called when patient arrives to floor for admission so that he may give all information that be needed. Son requested a [place] bed for ulcer prevention.   Santosh Stone RN given report a

## 2020-02-11 NOTE — PHYSICAL THERAPY NOTE
PHYSICAL THERAPY EVALUATION - INPATIENT     Room Number: 418/418-A  Evaluation Date: 2/11/2020  Type of Evaluation: Initial  Physician Order: PT Eval and Treat    Presenting Problem: Hyponatremia, cystitis and PNA  Reason for Therapy: Mobility Dysfun WIS d/t small ledge entry way; since previous fall in 2017 c C2 fx and vascular dementia, pt is unable to use RW d/t inability to use safely    SUBJECTIVE  Pt stated in [place] \"tired\"    Patient self-stated goal is per family- to t/f and amb c 1 person (including a wheelchair)?: A Lot   -   Need to walk in hospital room?: A Lot   -   Climbing 3-5 steps with a railing?: Total       AM-PAC Score:  Raw Score: 11   Approx Degree of Impairment: 72.57%   Standardized Score (AM-PAC Scale): 33.86   CMS Modifier strength, gait and balance. Functional outcome measures completed include The AM-PAC '6-Clicks' Inpatient Basic Mobility Short Form was completed and this patient is demonstrating a 72.57% degree of impairment in mobility.  Based on this evaluation, rush

## 2020-02-11 NOTE — CONSULTS
[facility]  Report of Consultation    Bremenartis Trevino Patient Status:  Inpatient    1924 MRN [id number]   [facility] 4NW-A Attending Jono Rose, DO   Hosp Day # 0 PCP Harinder Valdez MD     Reason for Consultation:  Hyp Axillary, resp. rate 16, height 63\", weight 120 lb (54.4 kg), SpO2 94 %. General: No acute distress. Does not respond to commands; eyes are  Open  HEENT: dry mucous membranes; Neck: No lymphadenopathy. No JVD. No carotid bruits.   Respiratory: Clear to controlled      Thank you for allowing me to participate in this patient's care. Please feel free to call me with any questions or concerns.     Dorian Westbrook MD  2/11/2020  9:02 AM

## 2020-02-11 NOTE — PLAN OF CARE
NURSING ADMISSION NOTE      Patient admitted via cart. Oriented to room. Safety precautions initiated. Bed in low position. Call light in reach. Admitted patient from ED due to confusion possibly 2/2 to the current  Urinary Tract Infection(UTI).

## 2020-02-11 NOTE — PROGRESS NOTES
[facility] Note: Renal dose adjustment for Enoxaparin (Lovenox)  Princess Neri has been prescribed Enoxaparin (Lovenox)  40 mg subcutaneously every 24 hours. Estimated Creatinine Clearance: 25.7 mL/min (A) (based on SCr of 1.06 mg/dL (H)).

## 2020-02-11 NOTE — ED PROVIDER NOTES
Patient Seen in: [facility] Emergency Department      History   Patient presents with:  Urinary Symptoms    Stated Complaint: Low sodium, possible uti    HPI    Patient is a 70-year-old female. Arrives via EMS.   Medical history of diabetes, hyperte audible nasal congestion  Lung: No distress, RR, no retraction,  Cardio: Regular rate and rhythm, normal S1-S2, no murmur appreciable  Extremities: Full ROM, no deformity, NVI  Abdominal: No obvious reaction while palpating all 4 quadrants. No distention. result                 Please view results for these tests on the individual orders.    URINE CULTURE, ROUTINE   BLOOD CULTURE   BLOOD CULTURE       Xr Chest Ap Portable  (cpt=71045)    Result Date: 2/10/2020  PROCEDURE:  XR CHEST AP PORTABLE  (CPT=71045) of right lower lobe due to infectious organism    Disposition:  Admit  2/10/2020  9:01 pm    Follow-up:  No follow-up provider specified.       Medications Prescribed:  Current Discharge Medication List                   Present on Admission           ICD-1

## 2020-02-11 NOTE — H&P
[facility]  History and Physical     [facility] Patient Status:  Emergency    1924 MRN [id number]   Location [facility] Attending Prashanth Lanza MD   Hosp Day # 0 PCP Lakia Verdugo MD     Chief Cream, Apply topically 4 (four) times daily. , Disp: , Rfl:   docusate sodium 100 MG Oral Cap, Take 100 mg by mouth nightly., Disp: , Rfl:   Clopidogrel Bisulfate 75 MG Oral Tab, Take 75 mg by mouth daily. , Disp: , Rfl:   simvastatin 20 MG Oral Tab, Take 20 Suspect secondary to urinary tract infection, continue with IV antibiotics, IV fluids. PT to evaluate in morning. Check CT head. 2. Urinary tract infection  3.  Hyponatremia: Check urine osmolarity, gentle IV fluid hydration as she does appear dehydrated

## 2020-02-11 NOTE — PROGRESS NOTES
[facility]  Progress Note     Deric Morris Patient Status:  Inpatient    1924 MRN [id number]   [facility] 4NW-A Attending Odalis Medel, DO   Hosp Day # 0 PCP Taz Paz MD     Chief Complaint: AMS     S: Rachel Vogt Intravenous Q24H   • Clopidogrel Bisulfate  75 mg Oral Daily   • atorvastatin  10 mg Oral Nightly   • enoxaparin  40 mg Subcutaneous Nightly       ASSESSMENT / PLAN:     1.  Acute encephalopathy, ?related to UTI, CT brain negative for acute changes, ?worsen

## 2020-02-11 NOTE — SLP NOTE
ADULT SWALLOWING EVALUATION    ASSESSMENT    ASSESSMENT/OVERALL IMPRESSION:  Order received for BSE due to altered diet consistency protocol. Pt is a 80 y.o female brought to ED by family due to increased confusion, fatigue.  Pt diagnosed with hyponatremia trials of puree and NTL via tspn/cup sips. Adequate oral acceptance and containment. Prolonged bolus formation and manipulation. Suspect delayed pharyngeal swallow response however consistent. Laryngeal elevation palpable and appeared adequate.  No overt cl Results: as per above    OBJECTIVE   ORAL MOTOR EXAMINATION     Symmetry: Unable to assess  Strength: Unable to assess     Range of Motion: Unable to assess       Voice Quality: Clear  Respiratory Status: Unlabored  Consistencies Trialed: Nectar thick liqu

## 2020-02-12 LAB
ANION GAP SERPL CALC-SCNC: 6 MMOL/L (ref 0–18)
BUN BLD-MCNC: 12 MG/DL (ref 7–18)
BUN/CREAT SERPL: 20.7 (ref 10–20)
CALCIUM BLD-MCNC: 9.1 MG/DL (ref 8.5–10.1)
CHLORIDE SERPL-SCNC: 102 MMOL/L (ref 98–112)
CO2 SERPL-SCNC: 25 MMOL/L (ref 21–32)
CREAT BLD-MCNC: 0.58 MG/DL (ref 0.55–1.02)
DEPRECATED RDW RBC AUTO: 40.1 FL (ref 35.1–46.3)
ERYTHROCYTE [DISTWIDTH] IN BLOOD BY AUTOMATED COUNT: 13 % (ref 11–15)
GLUCOSE BLD-MCNC: 110 MG/DL (ref 70–99)
HAV IGM SER QL: 1.9 MG/DL (ref 1.6–2.6)
HCT VFR BLD AUTO: 38.6 % (ref 35–48)
HGB BLD-MCNC: 12.3 G/DL (ref 12–16)
MCH RBC QN AUTO: 27 PG (ref 26–34)
MCHC RBC AUTO-ENTMCNC: 31.9 G/DL (ref 31–37)
MCV RBC AUTO: 84.8 FL (ref 80–100)
OSMOLALITY SERPL CALC.SUM OF ELEC: 276 MOSM/KG (ref 275–295)
PLATELET # BLD AUTO: 222 10(3)UL (ref 150–450)
POTASSIUM SERPL-SCNC: 3.7 MMOL/L (ref 3.5–5.1)
RBC # BLD AUTO: 4.55 X10(6)UL (ref 3.8–5.3)
SODIUM SERPL-SCNC: 133 MMOL/L (ref 136–145)
WBC # BLD AUTO: 10.3 X10(3) UL (ref 4–11)

## 2020-02-12 PROCEDURE — 99232 SBSQ HOSP IP/OBS MODERATE 35: CPT | Performed by: INTERNAL MEDICINE

## 2020-02-12 PROCEDURE — 99233 SBSQ HOSP IP/OBS HIGH 50: CPT | Performed by: HOSPITALIST

## 2020-02-12 RX ORDER — POTASSIUM CHLORIDE 20 MEQ/1
40 TABLET, EXTENDED RELEASE ORAL ONCE
Status: COMPLETED | OUTPATIENT
Start: 2020-02-12 | End: 2020-02-12

## 2020-02-12 NOTE — CM/SW NOTE
02/12/20 1600   CM/SW Referral Data   Referral Source Social Work (self-referral)   Reason for Referral Discharge planning   Informant Children   Patient Info   Patient Communication Issues Language barrier   Patient's [address]   Patient l

## 2020-02-12 NOTE — PROGRESS NOTES
[facility]  Progress Note    Cindy Carney Patient Status:  Inpatient    1924 MRN [id number]   [facility] 4NW-A Attending Siobhan Costello, DO   Hosp Day # 1 PCP Zackary Canavan, MD     No acute events overnight  Pt more sol intake/volume depletion. Improving   - continue fluids; monitor  2. UTI- on IV abx; E coli; pan-sensitive  3. AMS/dementia  4. HTN- controlled      Thank you for allowing me to participate in this patient's care.   Please feel free to call me with any que

## 2020-02-12 NOTE — OCCUPATIONAL THERAPY NOTE
OCCUPATIONAL THERAPY EVALUATION - INPATIENT     Room Number: 418/418-A  Evaluation Date: 2/12/2020  Type of Evaluation: Initial  Presenting Problem: hyponatremia    Physician Order: IP Consult to Occupational Therapy  Reason for Therapy: ADL/IADL Dysfuncti 1 person as well, however they struggle with mobility into the WIS d/t small ledge entry way; since previous fall in 2017\"    SUBJECTIVE   Pt with no verbalizations this session. Patient self-stated goal is not stated this session.     OBJECTIVE  Precau 74.7%  Standardized Score (AM-PAC Scale): 27.31  CMS Modifier (G-Code): CL    FUNCTIONAL TRANSFER ASSESSMENT  Supine to Sit : Not tested  Sit to Stand: Maximum assistance    Skilled Therapy Provided:  Attempted to coordinate therapy with family arrival per during OT evaluation, however per PT evaluation 2/11 family would like to take pt home with 24 hour care and home health therapy.  If pt were to return home at this time, pt would require a wheelchair, bedside commode, and physical assist of 2 vs lift machi with min assist  Patient will transfer to bedside commode:  with min assist    UE Exercise Program Goal  Patient will be supervision with bilateral AROM HEP (home exercise program).     Additional Goals:  Family will demonstrate safety with commode transfer

## 2020-02-12 NOTE — PLAN OF CARE
Pt is drowsy. Unable to assess orientation because patient does not respond. Sleeping most of shift. VSS. Afebrile. Does not appear to be in distress. Took pills crushed in applesauce with no issues. Rocephin IV. IV fluids infusing. Resting comfortably.  Wi

## 2020-02-12 NOTE — PROGRESS NOTES
[facility]  Progress Note     Luanne Saini Patient Status:  Inpatient    1924 MRN [id number]   [facility] 4NW-A Attending Meredith العلي MD   Hosp Day # 1 PCP Sylvania Sicard, MD     Chief Complaint: confusion    Cassi Portillo Intravenous Q24H   • Clopidogrel Bisulfate  75 mg Oral Daily   • atorvastatin  10 mg Oral Nightly   • enoxaparin  40 mg Subcutaneous Nightly       ASSESSMENT / PLAN:     1.  Acute encephalopathy, related to UTI, CT brain negative for acute changes, worsenin

## 2020-02-13 ENCOUNTER — APPOINTMENT (OUTPATIENT)
Dept: ULTRASOUND IMAGING | Facility: HOSPITAL | Age: 85
DRG: 689 | End: 2020-02-13
Attending: HOSPITALIST
Payer: MEDICARE

## 2020-02-13 LAB
ANION GAP SERPL CALC-SCNC: 5 MMOL/L (ref 0–18)
BUN BLD-MCNC: 16 MG/DL (ref 7–18)
BUN/CREAT SERPL: 26.7 (ref 10–20)
CALCIUM BLD-MCNC: 9.2 MG/DL (ref 8.5–10.1)
CHLORIDE SERPL-SCNC: 102 MMOL/L (ref 98–112)
CO2 SERPL-SCNC: 26 MMOL/L (ref 21–32)
CREAT BLD-MCNC: 0.6 MG/DL (ref 0.55–1.02)
GLUCOSE BLD-MCNC: 161 MG/DL (ref 70–99)
GLUCOSE BLD-MCNC: 204 MG/DL (ref 70–99)
OSMOLALITY SERPL CALC.SUM OF ELEC: 281 MOSM/KG (ref 275–295)
POTASSIUM SERPL-SCNC: 3.8 MMOL/L (ref 3.5–5.1)
SODIUM SERPL-SCNC: 133 MMOL/L (ref 136–145)

## 2020-02-13 PROCEDURE — 76775 US EXAM ABDO BACK WALL LIM: CPT | Performed by: HOSPITALIST

## 2020-02-13 PROCEDURE — 99232 SBSQ HOSP IP/OBS MODERATE 35: CPT | Performed by: HOSPITALIST

## 2020-02-13 PROCEDURE — 99232 SBSQ HOSP IP/OBS MODERATE 35: CPT | Performed by: INTERNAL MEDICINE

## 2020-02-13 RX ORDER — AMLODIPINE BESYLATE 5 MG/1
5 TABLET ORAL DAILY
Status: DISCONTINUED | OUTPATIENT
Start: 2020-02-13 | End: 2020-02-15

## 2020-02-13 RX ORDER — SENNOSIDES 8.6 MG
17.2 TABLET ORAL 2 TIMES DAILY
Status: DISCONTINUED | OUTPATIENT
Start: 2020-02-13 | End: 2020-02-15

## 2020-02-13 NOTE — SLP NOTE
SPEECH DAILY NOTE - INPATIENT    ASSESSMENT & PLAN   ASSESSMENT  Pt seen for dysphagia tx to assess tolerance with recommended diet, ensure proper utilization of aspiration precautions and provide pt/family education. No family present.  Pt received in seat son via phone conversation and he is well aware of swallowing/aspiration precautions.   Met with RN         FOLLOW UP  Follow Up Needed: No  SLP Follow-up Date: 02/12/20  Number of Visits to Meet Established Goals: 1    Session: 1    If you have any questio

## 2020-02-13 NOTE — PLAN OF CARE
Patient received alert with  no non verbal indicators of pain. She is unable to verbalize needs, all needs anticipated by staff. Appears to be resting comfortably.         Problem: Impaired Swallowing  Goal: Minimize aspiration risk  Description  Che Negrete

## 2020-02-13 NOTE — PHYSICAL THERAPY NOTE
PHYSICAL THERAPY TREATMENT NOTE - INPATIENT    Room Number: 729/165-Y     Session: 1   Number of Visits to Meet Established Goals: 3    Presenting Problem: Hyponatremia, cystitis and PNA  History related to current admission: Pt was admitted from home on Standing: Poor    ACTIVITY TOLERANCE                         O2 WALK                    AM-PAC '6-Clicks' INPATIENT SHORT FORM - BASIC MOBILITY  How much difficulty does the patient currently have. ..  -   Turning over in bed (including adjusting bedclothes r/w with cga/min assist. Chair follow provided. Pt completes stand to it with min assist with tactile cueing. Second trial sit <> stand with mod assist of 2 with HHA, pt appears fatigued and does not achieve upright posture. Positioned for comfort.  RN sol

## 2020-02-13 NOTE — OCCUPATIONAL THERAPY NOTE
OCCUPATIONAL THERAPY TREATMENT NOTE - INPATIENT     Room Number: 305/794-L  Session: 1   Number of Visits to Meet Established Goals: 5    Presenting Problem: hyponatremia    History related to current admission: Pt was admitted from home on 2/10/2020 with body clothing?: Total  -   Bathing (including washing, rinsing, drying)?: Total  -   Toileting, which includes using toilet, bedpan or urinal? : Total  -   Putting on and taking off regular upper body clothing?: Total  -   Taking care of personal grooming strengthening/ROM; Endurance training;Patient/Family education;Patient/Family training  Rehab Potential : Fair  Frequency (Obs): 3-5x/week      OT Goals:   ADL Goals -ongoing  Patient will perform lower body dressing:  with mod assist  Patient will perform

## 2020-02-13 NOTE — PROGRESS NOTES
[facility]  Progress Note     [facility]:  [facility]    1924 MRN [id number]   [facility] 4NW-A Attending Saskia Gleason MD   Hosp Day # 2 PCP Kenan Daily MD     Chief Complaint: increased confusion 168 hours. No results for input(s): TROP, CK in the last 168 hours. Imaging: Imaging data reviewed in Epic.     Medications:   • cefTRIAXone  1 g Intravenous Q24H   • Clopidogrel Bisulfate  75 mg Oral Daily   • atorvastatin  10 mg Oral Nightly

## 2020-02-13 NOTE — PROGRESS NOTES
[facility]  Progress Note    [facility] Single Patient Status:  Inpatient    1924 MRN [id number]   [facility] 4NW-A Attending Pat Bryson, DO   Hosp Day # 2 PCP Kenan Daily MD     No acute events overnight     0.9% NaC Imaging:  Reviewed    Impression:  1. Hyponatremia - suspect due to poor PO intake/volume depletion. Improving   - on gentle mIVF  - encourage PO intake  2. UTI- on IV abx; E coli; pan-sensitive  3. AMS/dementia  4.  HTN- controlled    Will follow per

## 2020-02-13 NOTE — PLAN OF CARE
Pt alert but unable to assess orientation d/t severe dementia and mostly nonverbal status. Up with assist to the chair. Clinatron bed. IVF and IV abx. This afternoon, pt with BP in 160s; MD notified and fluids decreased.  Also with some swelling in right fo

## 2020-02-14 ENCOUNTER — APPOINTMENT (OUTPATIENT)
Dept: CT IMAGING | Facility: HOSPITAL | Age: 85
DRG: 689 | End: 2020-02-14
Attending: HOSPITALIST
Payer: MEDICARE

## 2020-02-14 LAB
ANION GAP SERPL CALC-SCNC: 6 MMOL/L (ref 0–18)
BUN BLD-MCNC: 15 MG/DL (ref 7–18)
BUN/CREAT SERPL: 20.3 (ref 10–20)
CALCIUM BLD-MCNC: 10 MG/DL (ref 8.5–10.1)
CHLORIDE SERPL-SCNC: 99 MMOL/L (ref 98–112)
CO2 SERPL-SCNC: 28 MMOL/L (ref 21–32)
CREAT BLD-MCNC: 0.74 MG/DL (ref 0.55–1.02)
GLUCOSE BLD-MCNC: 258 MG/DL (ref 70–99)
OSMOLALITY SERPL CALC.SUM OF ELEC: 286 MOSM/KG (ref 275–295)
POTASSIUM SERPL-SCNC: 3.7 MMOL/L (ref 3.5–5.1)
SODIUM SERPL-SCNC: 133 MMOL/L (ref 136–145)

## 2020-02-14 PROCEDURE — 99232 SBSQ HOSP IP/OBS MODERATE 35: CPT | Performed by: HOSPITALIST

## 2020-02-14 PROCEDURE — 74176 CT ABD & PELVIS W/O CONTRAST: CPT | Performed by: HOSPITALIST

## 2020-02-14 NOTE — PLAN OF CARE
Assumed care in AM following RN report , eyes closed , in Clinitron bed, will open eyes when strongly encouraged, Pt  [place] and [employer] [place] ,  family states Pt has dementia, arms contracted to chest , stiffness all extremities ,  muscle wasting noted , de

## 2020-02-14 NOTE — CM/SW NOTE
Spoke w/son who stated family is there 24/7. Pt also has MD at home. [facility] and informed them the pt will dc later today or tomorrow.

## 2020-02-14 NOTE — PLAN OF CARE
Pt resting in bed. IV abx infusing per order. Ate whole container of applesauce. US kidney completed today. Purewick in place. Meds crushed. No complaints of pain. Report given to elsa MARTINEZ.

## 2020-02-14 NOTE — PLAN OF CARE
WAKEFUL , THOUGH MOSTLY SLEEPING UNLESS BEING FED , APPETITE GOOD , WALKED W/ PT/OT IN HALLWAY W/ MUCH DIRECTION AND USE OF WALKER , UP IN CHAIR THROUGH AM, COMPLETED ULTRASOUND OF KIDNEY, TAKES MEDS FED, CRUSHED IN APPLESAUCE

## 2020-02-14 NOTE — DISCHARGE SUMMARY
[facility] [facility]  DISCHARGE SUMMARY     Niyah Trevino Patient Status:  Inpatient    1924 MRN [id number]   [facility] 4NW-A Attending Akil Myrick MD   Hosp Day # 3 PCP Barbara Terrazas MD     Date of Admission: 2/10/2020  Da tablets (650 mg total) by mouth every 6 (six) hours as needed. Quantity:  30 tablet  Refills:  0     Calcium Carbonate-Vitamin D 250-125 MG-UNIT Tabs  Commonly known as:  OYSTER-D      Take 1 tablet by mouth 2 (two) times daily with meals.    Quantity:  6

## 2020-02-15 VITALS
DIASTOLIC BLOOD PRESSURE: 62 MMHG | TEMPERATURE: 98 F | HEART RATE: 79 BPM | HEIGHT: 63 IN | OXYGEN SATURATION: 96 % | BODY MASS INDEX: 21.26 KG/M2 | RESPIRATION RATE: 18 BRPM | SYSTOLIC BLOOD PRESSURE: 148 MMHG | WEIGHT: 120 LBS

## 2020-02-15 PROCEDURE — 99239 HOSP IP/OBS DSCHRG MGMT >30: CPT | Performed by: HOSPITALIST

## 2020-02-15 RX ORDER — POTASSIUM CHLORIDE 20 MEQ/1
40 TABLET, EXTENDED RELEASE ORAL ONCE
Status: COMPLETED | OUTPATIENT
Start: 2020-02-15 | End: 2020-02-15

## 2020-02-15 RX ORDER — DOCUSATE SODIUM 100 MG/1
100 CAPSULE, LIQUID FILLED ORAL ONCE
Status: COMPLETED | OUTPATIENT
Start: 2020-02-15 | End: 2020-02-15

## 2020-02-15 RX ORDER — AMLODIPINE BESYLATE 5 MG/1
5 TABLET ORAL DAILY
Qty: 30 TABLET | Refills: 0 | Status: SHIPPED | OUTPATIENT
Start: 2020-02-16

## 2020-02-15 NOTE — PLAN OF CARE
Pt A/O x4. VSS. Afebrile. IV abx given per MAR. New IV started in right forearm. Meds given crushed with applesauce. Pt tolerated well. Resting comfortably in bed. Call light within reach.     Problem: Impaired Swallowing  Goal: Minimize aspiration risk  Macy Huddleston

## 2020-02-15 NOTE — PROGRESS NOTES
NURSING DISCHARGE NOTE    Discharged Home via Ambulance. Accompanied by Family member  Belongings Taken by patient/family. Pt non-verbal but awake, appears comfortable, dc instruction given to son and verbalized understanding.

## 2020-02-15 NOTE — PROGRESS NOTES
[facility]  Progress Note     Ledyard Cool Patient Status:  Inpatient    1924 MRN [id number]   [facility] 4NW-A [facility] Harinder Valdez MD     Chief Complaint: Increased values in this interval not displayed. Estimated Creatinine Clearance: 36.8 mL/min (based on SCr of 0.74 mg/dL). No results for input(s): PTP, INR in the last 168 hours. No results for input(s): TROP, CK in the last 168 hours.          Imaging:

## 2020-02-15 NOTE — DISCHARGE SUMMARY
[facility] [place] [facility]ITAL[facility]  DISCHARGE SUMMARY      Patient Status:  Inpatient    1924 MRN [id number]   [facility] 4NW-A Attending Willis [place] [facility] Day # 4 PCP Levi Munroe MD     Date of Admission:  START taking these medications      Instructions Prescription details   Ciprofloxacin HCl 0.3 % Oint  Commonly known as:  CILOXAN      Place 1 Application into both eyes 3 (three) times daily for 7 days.    Stop taking on:  February 21, 2020  Quantity:  1 148/62    Physical Exam:    General: No acute distress. Respiratory: Clear to auscultation bilaterally. No wheezes. No rhonchi. Cardiovascular: S1, S2. Regular rate and rhythm. No murmurs, rubs or gallops. Abdomen: Soft, nontender, nondistended.   Posi

## 2020-04-10 ENCOUNTER — LAB REQUISITION (OUTPATIENT)
Dept: LAB | Age: 85
End: 2020-04-10
Payer: MEDICARE

## 2020-04-10 DIAGNOSIS — E87.1 HYPO-OSMOLALITY AND HYPONATREMIA: ICD-10-CM

## 2020-04-10 PROCEDURE — 80053 COMPREHEN METABOLIC PANEL: CPT

## 2020-04-15 PROCEDURE — 87086 URINE CULTURE/COLONY COUNT: CPT

## 2020-04-15 PROCEDURE — 81001 URINALYSIS AUTO W/SCOPE: CPT

## 2020-04-16 ENCOUNTER — LAB REQUISITION (OUTPATIENT)
Dept: LAB | Age: 85
End: 2020-04-16
Payer: MEDICARE

## 2020-04-16 DIAGNOSIS — N30.90 CYSTITIS, UNSPECIFIED WITHOUT HEMATURIA: ICD-10-CM

## 2020-05-20 ENCOUNTER — LAB REQUISITION (OUTPATIENT)
Dept: LAB | Facility: HOSPITAL | Age: 85
End: 2020-05-20
Payer: MEDICARE

## 2020-05-20 DIAGNOSIS — E11.49 TYPE 2 DIABETES MELLITUS WITH OTHER DIABETIC NEUROLOGICAL COMPLICATION (HCC): ICD-10-CM

## 2020-05-20 PROCEDURE — 83036 HEMOGLOBIN GLYCOSYLATED A1C: CPT | Performed by: SURGERY

## 2020-05-20 PROCEDURE — 87086 URINE CULTURE/COLONY COUNT: CPT | Performed by: SURGERY

## 2020-05-20 PROCEDURE — 85025 COMPLETE CBC W/AUTO DIFF WBC: CPT | Performed by: SURGERY

## 2020-05-20 PROCEDURE — 80053 COMPREHEN METABOLIC PANEL: CPT | Performed by: SURGERY

## 2020-05-20 PROCEDURE — 81001 URINALYSIS AUTO W/SCOPE: CPT | Performed by: SURGERY

## 2020-06-26 ENCOUNTER — LAB REQUISITION (OUTPATIENT)
Dept: LAB | Facility: HOSPITAL | Age: 85
End: 2020-06-26
Payer: MEDICARE

## 2020-06-26 DIAGNOSIS — F01.50 VASCULAR DEMENTIA WITHOUT BEHAVIORAL DISTURBANCE (HCC): ICD-10-CM

## 2020-06-26 PROCEDURE — 80053 COMPREHEN METABOLIC PANEL: CPT | Performed by: SURGERY

## 2020-06-26 PROCEDURE — 85025 COMPLETE CBC W/AUTO DIFF WBC: CPT | Performed by: SURGERY

## 2020-08-06 ENCOUNTER — LAB REQUISITION (OUTPATIENT)
Dept: LAB | Facility: HOSPITAL | Age: 85
End: 2020-08-06
Payer: MEDICARE

## 2020-08-06 DIAGNOSIS — E78.1 PURE HYPERGLYCERIDEMIA: ICD-10-CM

## 2020-08-06 DIAGNOSIS — F01.50 VASCULAR DEMENTIA WITHOUT BEHAVIORAL DISTURBANCE (HCC): ICD-10-CM

## 2020-08-06 LAB
ALBUMIN SERPL-MCNC: 3.6 G/DL (ref 3.4–5)
ALBUMIN/GLOB SERPL: 0.9 {RATIO} (ref 1–2)
ALP LIVER SERPL-CCNC: 122 U/L (ref 55–142)
ALT SERPL-CCNC: 16 U/L (ref 13–56)
ANION GAP SERPL CALC-SCNC: 4 MMOL/L (ref 0–18)
AST SERPL-CCNC: 12 U/L (ref 15–37)
BASOPHILS # BLD AUTO: 0.04 X10(3) UL (ref 0–0.2)
BASOPHILS NFR BLD AUTO: 0.9 %
BILIRUB SERPL-MCNC: 0.8 MG/DL (ref 0.1–2)
BILIRUB UR QL STRIP.AUTO: NEGATIVE
BUN BLD-MCNC: 13 MG/DL (ref 7–18)
BUN/CREAT SERPL: 18.3 (ref 10–20)
CALCIUM BLD-MCNC: 10.5 MG/DL (ref 8.5–10.1)
CHLORIDE SERPL-SCNC: 98 MMOL/L (ref 98–112)
CO2 SERPL-SCNC: 29 MMOL/L (ref 21–32)
COLOR UR AUTO: YELLOW
CREAT BLD-MCNC: 0.71 MG/DL (ref 0.55–1.02)
DEPRECATED RDW RBC AUTO: 42.1 FL (ref 35.1–46.3)
EOSINOPHIL # BLD AUTO: 0.08 X10(3) UL (ref 0–0.7)
EOSINOPHIL NFR BLD AUTO: 1.8 %
ERYTHROCYTE [DISTWIDTH] IN BLOOD BY AUTOMATED COUNT: 13.4 % (ref 11–15)
GLOBULIN PLAS-MCNC: 4.2 G/DL (ref 2.8–4.4)
GLUCOSE BLD-MCNC: 174 MG/DL (ref 70–99)
GLUCOSE UR STRIP.AUTO-MCNC: NEGATIVE MG/DL
HCT VFR BLD AUTO: 41.4 % (ref 35–48)
HGB BLD-MCNC: 13.1 G/DL (ref 12–16)
IMM GRANULOCYTES # BLD AUTO: 0.02 X10(3) UL (ref 0–1)
IMM GRANULOCYTES NFR BLD: 0.4 %
KETONES UR STRIP.AUTO-MCNC: NEGATIVE MG/DL
LYMPHOCYTES # BLD AUTO: 0.81 X10(3) UL (ref 1–4)
LYMPHOCYTES NFR BLD AUTO: 17.8 %
M PROTEIN MFR SERPL ELPH: 7.8 G/DL (ref 6.4–8.2)
MCH RBC QN AUTO: 27 PG (ref 26–34)
MCHC RBC AUTO-ENTMCNC: 31.6 G/DL (ref 31–37)
MCV RBC AUTO: 85.2 FL (ref 80–100)
MONOCYTES # BLD AUTO: 0.31 X10(3) UL (ref 0.1–1)
MONOCYTES NFR BLD AUTO: 6.8 %
NEUTROPHILS # BLD AUTO: 3.29 X10 (3) UL (ref 1.5–7.7)
NEUTROPHILS # BLD AUTO: 3.29 X10(3) UL (ref 1.5–7.7)
NEUTROPHILS NFR BLD AUTO: 72.3 %
NITRITE UR QL STRIP.AUTO: NEGATIVE
OSMOLALITY SERPL CALC.SUM OF ELEC: 276 MOSM/KG (ref 275–295)
PH UR STRIP.AUTO: 6 [PH] (ref 4.5–8)
PLATELET # BLD AUTO: 253 10(3)UL (ref 150–450)
POTASSIUM SERPL-SCNC: 4.5 MMOL/L (ref 3.5–5.1)
PROT UR STRIP.AUTO-MCNC: NEGATIVE MG/DL
RBC # BLD AUTO: 4.86 X10(6)UL (ref 3.8–5.3)
SODIUM SERPL-SCNC: 131 MMOL/L (ref 136–145)
SODIUM SERPL-SCNC: 131 MMOL/L (ref 136–145)
SP GR UR STRIP.AUTO: 1.01 (ref 1–1.03)
UROBILINOGEN UR STRIP.AUTO-MCNC: <2 MG/DL
WBC # BLD AUTO: 4.6 X10(3) UL (ref 4–11)

## 2020-08-06 PROCEDURE — 85025 COMPLETE CBC W/AUTO DIFF WBC: CPT | Performed by: SURGERY

## 2020-08-06 PROCEDURE — 81001 URINALYSIS AUTO W/SCOPE: CPT | Performed by: SURGERY

## 2020-08-06 PROCEDURE — 87086 URINE CULTURE/COLONY COUNT: CPT | Performed by: SURGERY

## 2020-08-06 PROCEDURE — 84295 ASSAY OF SERUM SODIUM: CPT | Performed by: SURGERY

## 2020-08-06 PROCEDURE — 80053 COMPREHEN METABOLIC PANEL: CPT | Performed by: SURGERY

## 2020-08-21 ENCOUNTER — LAB REQUISITION (OUTPATIENT)
Dept: LAB | Facility: HOSPITAL | Age: 85
End: 2020-08-21
Payer: MEDICARE

## 2020-08-21 DIAGNOSIS — E87.1 HYPO-OSMOLALITY AND HYPONATREMIA: ICD-10-CM

## 2020-08-21 LAB
ALBUMIN SERPL-MCNC: 3.5 G/DL (ref 3.4–5)
ALBUMIN/GLOB SERPL: 0.8 {RATIO} (ref 1–2)
ALP LIVER SERPL-CCNC: 128 U/L (ref 55–142)
ALT SERPL-CCNC: 16 U/L (ref 13–56)
ANION GAP SERPL CALC-SCNC: 2 MMOL/L (ref 0–18)
AST SERPL-CCNC: 10 U/L (ref 15–37)
BASOPHILS # BLD AUTO: 0.06 X10(3) UL (ref 0–0.2)
BASOPHILS NFR BLD AUTO: 0.8 %
BILIRUB SERPL-MCNC: 0.7 MG/DL (ref 0.1–2)
BILIRUB UR QL STRIP.AUTO: NEGATIVE
BUN BLD-MCNC: 15 MG/DL (ref 7–18)
BUN/CREAT SERPL: 19.5 (ref 10–20)
CALCIUM BLD-MCNC: 10.4 MG/DL (ref 8.5–10.1)
CHLORIDE SERPL-SCNC: 94 MMOL/L (ref 98–112)
CO2 SERPL-SCNC: 32 MMOL/L (ref 21–32)
COLOR UR AUTO: YELLOW
CREAT BLD-MCNC: 0.77 MG/DL (ref 0.55–1.02)
DEPRECATED RDW RBC AUTO: 43.8 FL (ref 35.1–46.3)
EOSINOPHIL # BLD AUTO: 0.16 X10(3) UL (ref 0–0.7)
EOSINOPHIL NFR BLD AUTO: 2.1 %
ERYTHROCYTE [DISTWIDTH] IN BLOOD BY AUTOMATED COUNT: 13.5 % (ref 11–15)
GLOBULIN PLAS-MCNC: 4.4 G/DL (ref 2.8–4.4)
GLUCOSE BLD-MCNC: 127 MG/DL (ref 70–99)
GLUCOSE UR STRIP.AUTO-MCNC: NEGATIVE MG/DL
HCT VFR BLD AUTO: 40.7 % (ref 35–48)
HGB BLD-MCNC: 12.2 G/DL (ref 12–16)
IMM GRANULOCYTES # BLD AUTO: 0.03 X10(3) UL (ref 0–1)
IMM GRANULOCYTES NFR BLD: 0.4 %
KETONES UR STRIP.AUTO-MCNC: NEGATIVE MG/DL
LYMPHOCYTES # BLD AUTO: 1.1 X10(3) UL (ref 1–4)
LYMPHOCYTES NFR BLD AUTO: 14.7 %
M PROTEIN MFR SERPL ELPH: 7.9 G/DL (ref 6.4–8.2)
MCH RBC QN AUTO: 26.4 PG (ref 26–34)
MCHC RBC AUTO-ENTMCNC: 30 G/DL (ref 31–37)
MCV RBC AUTO: 88.1 FL (ref 80–100)
MONOCYTES # BLD AUTO: 0.78 X10(3) UL (ref 0.1–1)
MONOCYTES NFR BLD AUTO: 10.5 %
NEUTROPHILS # BLD AUTO: 5.33 X10 (3) UL (ref 1.5–7.7)
NEUTROPHILS # BLD AUTO: 5.33 X10(3) UL (ref 1.5–7.7)
NEUTROPHILS NFR BLD AUTO: 71.5 %
NITRITE UR QL STRIP.AUTO: POSITIVE
OSMOLALITY SERPL CALC.SUM OF ELEC: 268 MOSM/KG (ref 275–295)
PH UR STRIP.AUTO: 7 [PH] (ref 4.5–8)
PLATELET # BLD AUTO: 192 10(3)UL (ref 150–450)
POTASSIUM SERPL-SCNC: 4 MMOL/L (ref 3.5–5.1)
RBC # BLD AUTO: 4.62 X10(6)UL (ref 3.8–5.3)
RBC #/AREA URNS AUTO: >10 /HPF
RBC #/AREA URNS AUTO: >10 /HPF
SODIUM SERPL-SCNC: 128 MMOL/L (ref 136–145)
SP GR UR STRIP.AUTO: 1.01 (ref 1–1.03)
UROBILINOGEN UR STRIP.AUTO-MCNC: 0.2 MG/DL
WBC # BLD AUTO: 7.5 X10(3) UL (ref 4–11)
WBC #/AREA URNS AUTO: >50 /HPF
WBC #/AREA URNS AUTO: >50 /HPF
WBC CLUMPS UR QL AUTO: PRESENT
WBC CLUMPS UR QL AUTO: PRESENT

## 2020-08-21 PROCEDURE — 87086 URINE CULTURE/COLONY COUNT: CPT | Performed by: ANESTHESIOLOGY

## 2020-08-21 PROCEDURE — 87088 URINE BACTERIA CULTURE: CPT | Performed by: ANESTHESIOLOGY

## 2020-08-21 PROCEDURE — 87186 SC STD MICRODIL/AGAR DIL: CPT | Performed by: ANESTHESIOLOGY

## 2020-08-21 PROCEDURE — 88108 CYTOPATH CONCENTRATE TECH: CPT

## 2020-08-21 PROCEDURE — 85025 COMPLETE CBC W/AUTO DIFF WBC: CPT | Performed by: ANESTHESIOLOGY

## 2020-08-21 PROCEDURE — 81001 URINALYSIS AUTO W/SCOPE: CPT | Performed by: ANESTHESIOLOGY

## 2020-08-21 PROCEDURE — 80053 COMPREHEN METABOLIC PANEL: CPT | Performed by: ANESTHESIOLOGY

## 2020-08-22 ENCOUNTER — LAB REQUISITION (OUTPATIENT)
Dept: LAB | Facility: HOSPITAL | Age: 85
End: 2020-08-22
Payer: MEDICARE

## 2020-08-22 DIAGNOSIS — E87.1 HYPO-OSMOLALITY AND HYPONATREMIA: ICD-10-CM

## 2020-08-25 LAB — NON GYNE INTERPRETATION: NEGATIVE

## 2020-09-21 ENCOUNTER — LAB REQUISITION (OUTPATIENT)
Dept: LAB | Facility: HOSPITAL | Age: 85
End: 2020-09-21
Payer: MEDICARE

## 2020-09-21 DIAGNOSIS — I11.9 HYPERTENSIVE HEART DISEASE WITHOUT HEART FAILURE: ICD-10-CM

## 2020-09-21 LAB
ALBUMIN SERPL-MCNC: 3.5 G/DL (ref 3.4–5)
ALBUMIN/GLOB SERPL: 0.8 {RATIO} (ref 1–2)
ALP LIVER SERPL-CCNC: 141 U/L
ALT SERPL-CCNC: 21 U/L
ANION GAP SERPL CALC-SCNC: 2 MMOL/L (ref 0–18)
AST SERPL-CCNC: 13 U/L (ref 15–37)
BILIRUB SERPL-MCNC: 0.8 MG/DL (ref 0.1–2)
BILIRUB UR QL STRIP.AUTO: NEGATIVE
BUN BLD-MCNC: 13 MG/DL (ref 7–18)
BUN/CREAT SERPL: 17.8 (ref 10–20)
CALCIUM BLD-MCNC: 10.6 MG/DL (ref 8.5–10.1)
CHLORIDE SERPL-SCNC: 90 MMOL/L (ref 98–112)
CO2 SERPL-SCNC: 31 MMOL/L (ref 21–32)
CREAT BLD-MCNC: 0.73 MG/DL
GLOBULIN PLAS-MCNC: 4.5 G/DL (ref 2.8–4.4)
GLUCOSE BLD-MCNC: 148 MG/DL (ref 70–99)
GLUCOSE UR STRIP.AUTO-MCNC: NEGATIVE MG/DL
HYALINE CASTS #/AREA URNS AUTO: PRESENT /LPF
KETONES UR STRIP.AUTO-MCNC: NEGATIVE MG/DL
M PROTEIN MFR SERPL ELPH: 8 G/DL (ref 6.4–8.2)
NITRITE UR QL STRIP.AUTO: POSITIVE
OSMOLALITY SERPL CALC.SUM OF ELEC: 259 MOSM/KG (ref 275–295)
PH UR STRIP.AUTO: 7 [PH] (ref 4.5–8)
POTASSIUM SERPL-SCNC: 4.4 MMOL/L (ref 3.5–5.1)
PROT UR STRIP.AUTO-MCNC: 30 MG/DL
RBC #/AREA URNS AUTO: >10 /HPF
SODIUM SERPL-SCNC: 123 MMOL/L (ref 136–145)
SODIUM SERPL-SCNC: 30 MMOL/L
SP GR UR STRIP.AUTO: 1.01 (ref 1–1.03)
UROBILINOGEN UR STRIP.AUTO-MCNC: <2 MG/DL
WBC #/AREA URNS AUTO: >50 /HPF
WBC CLUMPS UR QL AUTO: PRESENT

## 2020-09-21 PROCEDURE — 87086 URINE CULTURE/COLONY COUNT: CPT | Performed by: ANESTHESIOLOGY

## 2020-09-21 PROCEDURE — 87186 SC STD MICRODIL/AGAR DIL: CPT | Performed by: ANESTHESIOLOGY

## 2020-09-21 PROCEDURE — 87088 URINE BACTERIA CULTURE: CPT | Performed by: ANESTHESIOLOGY

## 2020-09-21 PROCEDURE — 84300 ASSAY OF URINE SODIUM: CPT | Performed by: ANESTHESIOLOGY

## 2020-09-21 PROCEDURE — 80053 COMPREHEN METABOLIC PANEL: CPT | Performed by: ANESTHESIOLOGY

## 2020-09-21 PROCEDURE — 81001 URINALYSIS AUTO W/SCOPE: CPT | Performed by: ANESTHESIOLOGY

## 2020-09-29 ENCOUNTER — LAB REQUISITION (OUTPATIENT)
Dept: LAB | Facility: HOSPITAL | Age: 85
End: 2020-09-29
Payer: MEDICARE

## 2020-09-29 DIAGNOSIS — E87.1 HYPO-OSMOLALITY AND HYPONATREMIA: ICD-10-CM

## 2020-09-29 PROCEDURE — 80053 COMPREHEN METABOLIC PANEL: CPT | Performed by: ANESTHESIOLOGY

## 2021-01-01 ENCOUNTER — NURSE ONLY (OUTPATIENT)
Dept: ELECTROPHYSIOLOGY | Facility: HOSPITAL | Age: 86
DRG: 871 | End: 2021-01-01
Attending: HOSPITALIST
Payer: MEDICARE

## 2021-01-01 ENCOUNTER — APPOINTMENT (OUTPATIENT)
Dept: MRI IMAGING | Facility: HOSPITAL | Age: 86
DRG: 871 | End: 2021-01-01
Attending: HOSPITALIST
Payer: MEDICARE

## 2021-01-01 ENCOUNTER — APPOINTMENT (OUTPATIENT)
Dept: CT IMAGING | Facility: HOSPITAL | Age: 86
DRG: 871 | End: 2021-01-01
Attending: HOSPITALIST
Payer: MEDICARE

## 2021-01-01 ENCOUNTER — APPOINTMENT (OUTPATIENT)
Dept: ULTRASOUND IMAGING | Facility: HOSPITAL | Age: 86
DRG: 871 | End: 2021-01-01
Attending: EMERGENCY MEDICINE
Payer: MEDICARE

## 2021-01-01 ENCOUNTER — APPOINTMENT (OUTPATIENT)
Dept: ULTRASOUND IMAGING | Facility: HOSPITAL | Age: 86
DRG: 871 | End: 2021-01-01
Attending: HOSPITALIST
Payer: MEDICARE

## 2021-01-01 ENCOUNTER — TELEPHONE (OUTPATIENT)
Dept: SURGERY | Facility: CLINIC | Age: 86
End: 2021-01-01

## 2021-01-01 ENCOUNTER — APPOINTMENT (OUTPATIENT)
Dept: GENERAL RADIOLOGY | Facility: HOSPITAL | Age: 86
DRG: 871 | End: 2021-01-01
Attending: PHYSICIAN ASSISTANT
Payer: MEDICARE

## 2021-01-01 ENCOUNTER — NURSE ONLY (OUTPATIENT)
Dept: ELECTROPHYSIOLOGY | Facility: HOSPITAL | Age: 86
DRG: 871 | End: 2021-01-01
Attending: PHYSICIAN ASSISTANT
Payer: MEDICARE

## 2021-01-01 ENCOUNTER — APPOINTMENT (OUTPATIENT)
Dept: GENERAL RADIOLOGY | Facility: HOSPITAL | Age: 86
DRG: 871 | End: 2021-01-01
Attending: HOSPITALIST
Payer: MEDICARE

## 2021-01-01 ENCOUNTER — APPOINTMENT (OUTPATIENT)
Dept: CV DIAGNOSTICS | Facility: HOSPITAL | Age: 86
DRG: 871 | End: 2021-01-01
Attending: INTERNAL MEDICINE
Payer: MEDICARE

## 2021-01-01 ENCOUNTER — APPOINTMENT (OUTPATIENT)
Dept: GENERAL RADIOLOGY | Facility: HOSPITAL | Age: 86
DRG: 871 | End: 2021-01-01
Attending: EMERGENCY MEDICINE
Payer: MEDICARE

## 2021-01-01 ENCOUNTER — HOSPITAL ENCOUNTER (INPATIENT)
Facility: HOSPITAL | Age: 86
LOS: 24 days | DRG: 871 | End: 2021-01-01
Attending: EMERGENCY MEDICINE | Admitting: HOSPITALIST
Payer: MEDICARE

## 2021-01-01 ENCOUNTER — APPOINTMENT (OUTPATIENT)
Dept: CT IMAGING | Facility: HOSPITAL | Age: 86
DRG: 871 | End: 2021-01-01
Attending: STUDENT IN AN ORGANIZED HEALTH CARE EDUCATION/TRAINING PROGRAM
Payer: MEDICARE

## 2021-01-01 ENCOUNTER — APPOINTMENT (OUTPATIENT)
Dept: GENERAL RADIOLOGY | Facility: HOSPITAL | Age: 86
DRG: 871 | End: 2021-01-01
Attending: STUDENT IN AN ORGANIZED HEALTH CARE EDUCATION/TRAINING PROGRAM
Payer: MEDICARE

## 2021-01-01 ENCOUNTER — APPOINTMENT (OUTPATIENT)
Dept: ULTRASOUND IMAGING | Facility: HOSPITAL | Age: 86
DRG: 871 | End: 2021-01-01
Attending: PHYSICIAN ASSISTANT
Payer: MEDICARE

## 2021-01-01 ENCOUNTER — APPOINTMENT (OUTPATIENT)
Dept: CT IMAGING | Facility: HOSPITAL | Age: 86
DRG: 871 | End: 2021-01-01
Attending: PHYSICIAN ASSISTANT
Payer: MEDICARE

## 2021-01-01 VITALS
TEMPERATURE: 97 F | HEIGHT: 63 IN | OXYGEN SATURATION: 97 % | RESPIRATION RATE: 28 BRPM | HEART RATE: 63 BPM | SYSTOLIC BLOOD PRESSURE: 110 MMHG | WEIGHT: 102.06 LBS | DIASTOLIC BLOOD PRESSURE: 44 MMHG | BODY MASS INDEX: 18.08 KG/M2

## 2021-01-01 DIAGNOSIS — N32.89 BLADDER WALL THICKENING: ICD-10-CM

## 2021-01-01 DIAGNOSIS — E87.1 HYPONATREMIA: Primary | ICD-10-CM

## 2021-01-01 DIAGNOSIS — N30.01 ACUTE CYSTITIS WITH HEMATURIA: ICD-10-CM

## 2021-01-01 PROCEDURE — 99233 SBSQ HOSP IP/OBS HIGH 50: CPT | Performed by: OTHER

## 2021-01-01 PROCEDURE — 93970 EXTREMITY STUDY: CPT | Performed by: PHYSICIAN ASSISTANT

## 2021-01-01 PROCEDURE — 99233 SBSQ HOSP IP/OBS HIGH 50: CPT | Performed by: HOSPITALIST

## 2021-01-01 PROCEDURE — 70450 CT HEAD/BRAIN W/O DYE: CPT | Performed by: STUDENT IN AN ORGANIZED HEALTH CARE EDUCATION/TRAINING PROGRAM

## 2021-01-01 PROCEDURE — 99232 SBSQ HOSP IP/OBS MODERATE 35: CPT | Performed by: OTHER

## 2021-01-01 PROCEDURE — 99232 SBSQ HOSP IP/OBS MODERATE 35: CPT | Performed by: HOSPITALIST

## 2021-01-01 PROCEDURE — 74177 CT ABD & PELVIS W/CONTRAST: CPT | Performed by: STUDENT IN AN ORGANIZED HEALTH CARE EDUCATION/TRAINING PROGRAM

## 2021-01-01 PROCEDURE — 99232 SBSQ HOSP IP/OBS MODERATE 35: CPT | Performed by: STUDENT IN AN ORGANIZED HEALTH CARE EDUCATION/TRAINING PROGRAM

## 2021-01-01 PROCEDURE — 93880 EXTRACRANIAL BILAT STUDY: CPT | Performed by: HOSPITALIST

## 2021-01-01 PROCEDURE — 99233 SBSQ HOSP IP/OBS HIGH 50: CPT | Performed by: INTERNAL MEDICINE

## 2021-01-01 PROCEDURE — 71045 X-RAY EXAM CHEST 1 VIEW: CPT | Performed by: EMERGENCY MEDICINE

## 2021-01-01 PROCEDURE — 70450 CT HEAD/BRAIN W/O DYE: CPT | Performed by: HOSPITALIST

## 2021-01-01 PROCEDURE — 76830 TRANSVAGINAL US NON-OB: CPT | Performed by: EMERGENCY MEDICINE

## 2021-01-01 PROCEDURE — 76856 US EXAM PELVIC COMPLETE: CPT | Performed by: EMERGENCY MEDICINE

## 2021-01-01 PROCEDURE — 95819 EEG AWAKE AND ASLEEP: CPT | Performed by: OTHER

## 2021-01-01 PROCEDURE — 70551 MRI BRAIN STEM W/O DYE: CPT | Performed by: HOSPITALIST

## 2021-01-01 PROCEDURE — 93306 TTE W/DOPPLER COMPLETE: CPT | Performed by: INTERNAL MEDICINE

## 2021-01-01 PROCEDURE — 71275 CT ANGIOGRAPHY CHEST: CPT | Performed by: PHYSICIAN ASSISTANT

## 2021-01-01 PROCEDURE — 71045 X-RAY EXAM CHEST 1 VIEW: CPT | Performed by: STUDENT IN AN ORGANIZED HEALTH CARE EDUCATION/TRAINING PROGRAM

## 2021-01-01 PROCEDURE — 99222 1ST HOSP IP/OBS MODERATE 55: CPT | Performed by: PHYSICIAN ASSISTANT

## 2021-01-01 PROCEDURE — 71045 X-RAY EXAM CHEST 1 VIEW: CPT | Performed by: PHYSICIAN ASSISTANT

## 2021-01-01 PROCEDURE — 99223 1ST HOSP IP/OBS HIGH 75: CPT | Performed by: INTERNAL MEDICINE

## 2021-01-01 PROCEDURE — 99223 1ST HOSP IP/OBS HIGH 75: CPT | Performed by: HOSPITALIST

## 2021-01-01 PROCEDURE — 99232 SBSQ HOSP IP/OBS MODERATE 35: CPT | Performed by: INTERNAL MEDICINE

## 2021-01-01 PROCEDURE — 71045 X-RAY EXAM CHEST 1 VIEW: CPT | Performed by: HOSPITALIST

## 2021-01-01 PROCEDURE — 99233 SBSQ HOSP IP/OBS HIGH 50: CPT | Performed by: STUDENT IN AN ORGANIZED HEALTH CARE EDUCATION/TRAINING PROGRAM

## 2021-01-01 PROCEDURE — 99223 1ST HOSP IP/OBS HIGH 75: CPT | Performed by: OTHER

## 2021-01-01 PROCEDURE — 99442 PHONE E/M BY PHYS 11-20 MIN: CPT | Performed by: PHYSICIAN ASSISTANT

## 2021-01-01 RX ORDER — DEXTROSE MONOHYDRATE 50 MG/ML
INJECTION, SOLUTION INTRAVENOUS CONTINUOUS
Status: DISCONTINUED | OUTPATIENT
Start: 2021-01-01 | End: 2021-01-01

## 2021-01-01 RX ORDER — FUROSEMIDE 10 MG/ML
20 INJECTION INTRAMUSCULAR; INTRAVENOUS ONCE
Status: COMPLETED | OUTPATIENT
Start: 2021-01-01 | End: 2021-01-01

## 2021-01-01 RX ORDER — POTASSIUM CHLORIDE 14.9 MG/ML
20 INJECTION INTRAVENOUS ONCE
Status: DISCONTINUED | OUTPATIENT
Start: 2021-01-01 | End: 2021-01-01

## 2021-01-01 RX ORDER — SENNOSIDES 8.6 MG
8.6 TABLET ORAL 2 TIMES DAILY PRN
Status: DISCONTINUED | OUTPATIENT
Start: 2021-01-01 | End: 2021-01-01

## 2021-01-01 RX ORDER — SODIUM CHLORIDE, SODIUM LACTATE, POTASSIUM CHLORIDE, CALCIUM CHLORIDE 600; 310; 30; 20 MG/100ML; MG/100ML; MG/100ML; MG/100ML
INJECTION, SOLUTION INTRAVENOUS CONTINUOUS
Status: DISCONTINUED | OUTPATIENT
Start: 2021-01-01 | End: 2021-01-01

## 2021-01-01 RX ORDER — HYDRALAZINE HYDROCHLORIDE 20 MG/ML
5 INJECTION INTRAMUSCULAR; INTRAVENOUS EVERY 6 HOURS PRN
Status: DISCONTINUED | OUTPATIENT
Start: 2021-01-01 | End: 2021-01-01

## 2021-01-01 RX ORDER — POLYETHYLENE GLYCOL 3350 17 G/17G
17 POWDER, FOR SOLUTION ORAL DAILY PRN
Status: DISCONTINUED | OUTPATIENT
Start: 2021-01-01 | End: 2021-01-01

## 2021-01-01 RX ORDER — DEXTROSE MONOHYDRATE 25 G/50ML
50 INJECTION, SOLUTION INTRAVENOUS
Status: DISCONTINUED | OUTPATIENT
Start: 2021-01-01 | End: 2021-01-01

## 2021-01-01 RX ORDER — FUROSEMIDE 10 MG/ML
40 INJECTION INTRAMUSCULAR; INTRAVENOUS ONCE
Status: DISCONTINUED | OUTPATIENT
Start: 2021-01-01 | End: 2021-01-01

## 2021-01-01 RX ORDER — SODIUM CHLORIDE 9 MG/ML
INJECTION, SOLUTION INTRAVENOUS CONTINUOUS
Status: ACTIVE | OUTPATIENT
Start: 2021-01-01 | End: 2021-01-01

## 2021-01-01 RX ORDER — ONDANSETRON 2 MG/ML
4 INJECTION INTRAMUSCULAR; INTRAVENOUS EVERY 4 HOURS PRN
Status: ACTIVE | OUTPATIENT
Start: 2021-01-01 | End: 2021-01-01

## 2021-01-01 RX ORDER — ATORVASTATIN CALCIUM 10 MG/1
10 TABLET, FILM COATED ORAL NIGHTLY
Status: DISCONTINUED | OUTPATIENT
Start: 2021-01-01 | End: 2021-01-01

## 2021-01-01 RX ORDER — GLYCOPYRROLATE 0.2 MG/ML
0.4 INJECTION, SOLUTION INTRAMUSCULAR; INTRAVENOUS
Status: DISCONTINUED | OUTPATIENT
Start: 2021-01-01 | End: 2021-01-01

## 2021-01-01 RX ORDER — DEXTROSE, SODIUM CHLORIDE, SODIUM LACTATE, POTASSIUM CHLORIDE, AND CALCIUM CHLORIDE 5; .6; .31; .03; .02 G/100ML; G/100ML; G/100ML; G/100ML; G/100ML
INJECTION, SOLUTION INTRAVENOUS CONTINUOUS
Status: DISCONTINUED | OUTPATIENT
Start: 2021-01-01 | End: 2021-01-01

## 2021-01-01 RX ORDER — ONDANSETRON 4 MG/1
4 TABLET, ORALLY DISINTEGRATING ORAL EVERY 6 HOURS PRN
Status: DISCONTINUED | OUTPATIENT
Start: 2021-01-01 | End: 2021-01-01

## 2021-01-01 RX ORDER — AMLODIPINE BESYLATE 5 MG/1
5 TABLET ORAL DAILY
Status: DISCONTINUED | OUTPATIENT
Start: 2021-01-01 | End: 2021-01-01

## 2021-01-01 RX ORDER — HEPARIN SODIUM 5000 [USP'U]/ML
5000 INJECTION, SOLUTION INTRAVENOUS; SUBCUTANEOUS EVERY 12 HOURS SCHEDULED
Status: DISCONTINUED | OUTPATIENT
Start: 2021-01-01 | End: 2021-01-01

## 2021-01-01 RX ORDER — ENOXAPARIN SODIUM 100 MG/ML
40 INJECTION SUBCUTANEOUS DAILY
Status: DISCONTINUED | OUTPATIENT
Start: 2021-01-01 | End: 2021-01-01

## 2021-01-01 RX ORDER — HALOPERIDOL 5 MG/ML
1 INJECTION INTRAMUSCULAR
Status: DISCONTINUED | OUTPATIENT
Start: 2021-01-01 | End: 2021-01-01

## 2021-01-01 RX ORDER — SODIUM CHLORIDE 9 MG/ML
INJECTION, SOLUTION INTRAVENOUS CONTINUOUS
Status: DISCONTINUED | OUTPATIENT
Start: 2021-01-01 | End: 2021-01-01

## 2021-01-01 RX ORDER — CLOPIDOGREL BISULFATE 75 MG/1
75 TABLET ORAL DAILY
Status: DISCONTINUED | OUTPATIENT
Start: 2021-01-01 | End: 2021-01-01

## 2021-01-01 RX ORDER — AMLODIPINE BESYLATE 5 MG/1
10 TABLET ORAL DAILY
Status: DISCONTINUED | OUTPATIENT
Start: 2021-01-01 | End: 2021-01-01

## 2021-01-01 RX ORDER — POTASSIUM CHLORIDE 14.9 MG/ML
20 INJECTION INTRAVENOUS ONCE
Status: COMPLETED | OUTPATIENT
Start: 2021-01-01 | End: 2021-01-01

## 2021-01-01 RX ORDER — MORPHINE SULFATE 2 MG/ML
0.5 INJECTION, SOLUTION INTRAMUSCULAR; INTRAVENOUS EVERY 6 HOURS
Status: DISCONTINUED | OUTPATIENT
Start: 2021-01-01 | End: 2021-01-01

## 2021-01-01 RX ORDER — DOCUSATE SODIUM 100 MG/1
100 CAPSULE, LIQUID FILLED ORAL NIGHTLY
Status: DISCONTINUED | OUTPATIENT
Start: 2021-01-01 | End: 2021-01-01

## 2021-01-01 RX ORDER — ONDANSETRON 2 MG/ML
4 INJECTION INTRAMUSCULAR; INTRAVENOUS EVERY 6 HOURS PRN
Status: DISCONTINUED | OUTPATIENT
Start: 2021-01-01 | End: 2021-01-01

## 2021-01-01 RX ORDER — SODIUM CHLORIDE 1000 MG
1 TABLET, SOLUBLE MISCELLANEOUS
Status: DISCONTINUED | OUTPATIENT
Start: 2021-01-01 | End: 2021-01-01

## 2021-01-01 RX ORDER — ALBUTEROL SULFATE 2.5 MG/3ML
2.5 SOLUTION RESPIRATORY (INHALATION) ONCE
Status: COMPLETED | OUTPATIENT
Start: 2021-01-01 | End: 2021-01-01

## 2021-01-01 RX ORDER — MORPHINE SULFATE 2 MG/ML
1 INJECTION, SOLUTION INTRAMUSCULAR; INTRAVENOUS
Status: DISCONTINUED | OUTPATIENT
Start: 2021-01-01 | End: 2021-01-01

## 2021-01-01 RX ORDER — SENNOSIDES 8.6 MG
8.6 TABLET ORAL 2 TIMES DAILY
Status: DISCONTINUED | OUTPATIENT
Start: 2021-01-01 | End: 2021-01-01

## 2021-01-01 RX ORDER — ASPIRIN 300 MG
300 SUPPOSITORY, RECTAL RECTAL DAILY
Status: DISCONTINUED | OUTPATIENT
Start: 2021-01-01 | End: 2021-01-01

## 2021-01-01 RX ORDER — SODIUM CHLORIDE 1000 MG
1 TABLET, SOLUBLE MISCELLANEOUS
COMMUNITY
End: 2021-01-01

## 2021-01-01 RX ORDER — SODIUM CHLORIDE 450 MG/100ML
INJECTION, SOLUTION INTRAVENOUS CONTINUOUS
Status: DISCONTINUED | OUTPATIENT
Start: 2021-01-01 | End: 2021-01-01

## 2021-01-01 RX ORDER — FUROSEMIDE 20 MG/1
20 TABLET ORAL ONCE
Status: COMPLETED | OUTPATIENT
Start: 2021-01-01 | End: 2021-01-01

## 2021-01-01 RX ORDER — HALOPERIDOL 5 MG/ML
2 INJECTION INTRAMUSCULAR
Status: DISCONTINUED | OUTPATIENT
Start: 2021-01-01 | End: 2021-01-01

## 2021-01-01 RX ORDER — ACETAMINOPHEN 325 MG/1
650 TABLET ORAL EVERY 6 HOURS PRN
Status: DISCONTINUED | OUTPATIENT
Start: 2021-01-01 | End: 2021-01-01

## 2021-01-01 RX ORDER — ASPIRIN 81 MG/1
81 TABLET, CHEWABLE ORAL DAILY
Status: DISCONTINUED | OUTPATIENT
Start: 2021-01-01 | End: 2021-01-01

## 2021-01-01 RX ORDER — POTASSIUM CHLORIDE 14.9 MG/ML
20 INJECTION INTRAVENOUS ONCE
Status: DISCONTINUED | OUTPATIENT
Start: 2021-01-01 | End: 2021-01-01 | Stop reason: ALTCHOICE

## 2021-01-01 RX ORDER — LABETALOL HYDROCHLORIDE 5 MG/ML
10 INJECTION, SOLUTION INTRAVENOUS EVERY 6 HOURS PRN
Status: DISCONTINUED | OUTPATIENT
Start: 2021-01-01 | End: 2021-01-01

## 2021-01-01 RX ORDER — SODIUM CHLORIDE 9 MG/ML
1000 INJECTION, SOLUTION INTRAVENOUS ONCE
Status: COMPLETED | OUTPATIENT
Start: 2021-01-01 | End: 2021-01-01

## 2021-01-15 NOTE — CM/SW NOTE
Responses from [employer] [facility], [facility], [facility], [employer], [facility] [place], [address] - all of these facilities have declined pt, either no Medicaid bed or liability with family wanting to feed her.   Some facilities would accept denies pain/discomfort

## 2021-06-02 NOTE — CM/SW NOTE
02/15/20 1100   Discharge disposition   Expected discharge disposition Home-Health   Additional Home Care/Hospice Provider Residential   Discharge transportation [facility] ambulance arranged for transport today at - to home  [facility] noti [facility] [address] [place] [address]  Phone: [phone]  Fax: [phone]  Primary Provider: Noemi Adorno  Pre-op Performing Provider: TRINIDAD COOK      PREOPERATIVE EVALUATION:  Today's date: 6/2/2021    Laila Powell is a 31 year old female who presents for a preoperative evaluation.    Surgical Information:  Surgery/Procedure: Abdominoplasty   Surgery Location: [facility]  Surgeon:   Surgery Date: 06/17/21   Time of Surgery:   Where patient plans to recover: At home with family  Fax number for surgical facility: [phone]      Type of Anesthesia Anticipated: General    Assessment & Plan     The proposed surgical procedure is considered Intermediate risk.    Preop general physical exam  Per surgeon request will obtain EKG and CBC. Given age and lack of comorbidities patient at low risk from surgical perspective. Approved for procedure.   - EKG 12-lead complete w/read - Clinics  - CBC with Plt ([facility])         Risks and Recommendations:  The patient has the following additional risks and recommendations for perioperative complications:   - No identified additional risk factors other than previously addressed    Medication Instructions:  Hold phentermine for 7 days prior to surgery.     RECOMMENDATION:  APPROVAL GIVEN to proceed with proposed procedure, without further diagnostic evaluation         Subjective     HPI related to upcoming procedure:     Patient will be undergoing abdominoplasty as part of a revision of prior surgery from September 2020.   No personal or family history of complications related to anesthesia. No difficulty climbing a flight of stairs.  Requesting letter for leave of absence from work for this surgery.  Hold phentermine for 7 days prior to surgery.        Preop Questions 6/2/2021   1. Have you ever had a heart attack or stroke? No   2. Have you ever had surgery on your heart or blood vessels, such as a  stent placement, a coronary artery bypass, or surgery on an artery in your head, neck, heart, or legs? No   3. Do you have chest pain with activity? No   4. Do you have a history of  heart failure? No   5. Do you currently have a cold, bronchitis or symptoms of other infection? No   6. Do you have a cough, shortness of breath, or wheezing? No   7. Do you or anyone in your family have previous history of blood clots? No   8. Do you or does anyone in your family have a serious bleeding problem such as prolonged bleeding following surgeries or cuts? No   9. Have you ever had problems with anemia or been told to take iron pills? No   10. Have you had any abnormal blood loss such as black, tarry or bloody stools, or abnormal vaginal bleeding? No   11. Have you ever had a blood transfusion? YES - during  Oct 2013   11a. Have you ever had a transfusion reaction? No    12. Are you willing to have a blood transfusion if it is medically needed before, during, or after your surgery? Yes   13. Have you or any of your relatives ever had problems with anesthesia? No   14. Do you have sleep apnea, excessive snoring or daytime drowsiness? No   15. Do you have any artifical heart valves or other implanted medical devices like a pacemaker, defibrillator, or continuous glucose monitor? No   16. Do you have artificial joints? No   17. Are you allergic to latex? No   18. Is there any chance that you may be pregnant? No       Health Care Directive:  Patient does not have a Health Care Directive or Living Will: Discussed advance care planning with patient; however, patient declined at this time.    Preoperative Review of :   reviewed - controlled substances reflected in medication list.    Status of Chronic Conditions:  HYPOTHYROIDISM - Patient has a longstanding history of chronic Hypothyroidism. Patient has been doing well, noting no tremor, insomnia, hair loss or changes in skin texture. Continues to take medications as  directed, without adverse reactions or side effects. Last TSH No results found for: TSH.        Review of Systems  CONSTITUTIONAL: NEGATIVE for fever, chills, change in weight  INTEGUMENTARY/SKIN: NEGATIVE for worrisome rashes, moles or lesions  EYES: NEGATIVE for vision changes or irritation  ENT/MOUTH: NEGATIVE for ear, mouth and throat problems  RESP: NEGATIVE for significant cough or SOB  BREAST: NEGATIVE for masses, tenderness or discharge  CV: NEGATIVE for chest pain, palpitations or peripheral edema  GI: NEGATIVE for nausea, abdominal pain, heartburn, or change in bowel habits  : NEGATIVE for frequency, dysuria, or hematuria  MUSCULOSKELETAL: NEGATIVE for significant arthralgias or myalgia  NEURO: NEGATIVE for weakness, dizziness or paresthesias  ENDOCRINE: NEGATIVE for temperature intolerance, skin/hair changes  HEME: NEGATIVE for bleeding problems  PSYCHIATRIC: NEGATIVE for changes in mood or affect    Patient Active Problem List    Diagnosis Date Noted     Class 1 obesity due to excess calories without serious comorbidity with body mass index (BMI) of 31.0 to 31.9 in adult 06/29/2020     Priority: Medium     Gastroesophageal reflux disease, esophagitis presence not specified 06/19/2020     Priority: Medium     IMO Regulatory Load OCT 2020       Vitamin D deficiency 06/19/2020     Priority: Medium     Hypothyroidism 11/04/2013     Priority: Medium     Health Care Home 01/08/2013     Priority: Medium     Tier Level: 0    DX V65.8 REPLACED WITH 88013 HEALTH CARE HOME (04/08/2013)       Microscopic hematuria 01/08/2013     Priority: Medium      Past Medical History:   Diagnosis Date     Anemia 11/4/2013     Headache      Hypothyroidism 11/4/2013     Reflux gastritis      Past Surgical History:   Procedure Laterality Date     ------------OTHER-------------  10/06/2020    BBL - Stomach reconstruction breast augmenatation      CHOLECYSTECTOMY  2013    [facility]     Current Outpatient  Medications   Medication Sig Dispense Refill     Elastic Bandages & Supports (ACE ABDOMEN SURGICAL BINDER) MISC 1 Units daily 1 each 0     norgestim-eth estrad triphasic (ORTHO TRI-CYCLEN) 0.18/0.215/0.25 MG-35 MCG tablet Take 1 tablet by mouth daily 28 tablet 3     omeprazole (PRILOSEC) 20 MG DR capsule Take 1 capsule (20 mg) by mouth 2 times daily 180 capsule 3     ondansetron (ZOFRAN-ODT) 4 MG ODT tab Take 1 tablet (4 mg) by mouth every 12 hours as needed for nausea 30 tablet 0     ondansetron (ZOFRAN-ODT) 4 MG ODT tab Take 1 tablet (4 mg) by mouth every 8 hours as needed for nausea 30 tablet 3     phentermine (ADIPEX-P) 15 MG capsule Take 1 capsule (15 mg) by mouth every morning 30 capsule 0     SYNTHROID 112 MCG tablet Take 1 tablet (112 mcg) by mouth daily 90 tablet 3     topiramate (TOPAMAX) 50 MG tablet Take 1 tablet (50 mg) by mouth daily 30 tablet 0     vitamin D3 (CHOLECALCIFEROL) 50 mcg (2000 units) tablet Take 1 tablet (50 mcg) by mouth daily 90 tablet 3       Allergies   Allergen Reactions     Ciprofloxacin      Levothyroxine Swelling        Social History     Tobacco Use     Smoking status: Passive Smoke Exposure - Never Smoker     Smokeless tobacco: Never Used     Tobacco comment:  smokes   Substance Use Topics     Alcohol use: No     Alcohol/week: 0.0 standard drinks     Family History   Problem Relation Age of Onset     Diabetes No family hx of      Coronary Artery Disease No family hx of      Hypertension No family hx of      Breast Cancer No family hx of      Colon Cancer No family hx of      Prostate Cancer No family hx of      Other Cancer No family hx of      History   Drug Use No         Objective     /81   Pulse 88   Resp 20   Wt 66.2 kg (146 lb)   LMP 05/31/2021   SpO2 98%   BMI 30.65 kg/m      Physical Exam    GENERAL APPEARANCE: healthy, alert and no distress     EYES: EOMI, PERRL     HENT: ear canals and TM's normal and nose and mouth without ulcers or lesions      NECK: no adenopathy, no asymmetry, masses, or scars and thyroid normal to palpation     RESP: lungs clear to auscultation - no rales, rhonchi or wheezes     CV: regular rates and rhythm, normal S1 S2, no S3 or S4 and no murmur, click or rub     ABDOMEN:  soft, nontender, no HSM or masses and bowel sounds normal     MS: extremities normal- no gross deformities noted, no evidence of inflammation in joints, FROM in all extremities.     SKIN: no suspicious lesions or rashes     NEURO: Normal strength and tone, sensory exam grossly normal, mentation intact and speech normal     PSYCH: mentation appears normal. and affect normal/bright     LYMPHATICS: No cervical adenopathy    Recent Labs   Lab Test 04/29/21  1154 09/10/20  1203 09/10/20  1153   HGB  --   --  14.0   INR  --   --  0.93   NA  --  139  --    POTASSIUM  --  4.0  --    CR  --  0.63  --    A1C 5.4  --   --         Diagnostics:  Recent Results (from the past 24 hour(s))   CBC with Plt ([facility])    Collection Time: 06/02/21  4:24 PM   Result Value Ref Range    WBC 5.7 4.0 - 11.0 10e9/L    RBC 4.66 3.80 - 5.20 10e12/L    Hemoglobin 13.7 11.7 - 15.7 g/dL    Hematocrit 39.9 35.0 - 47.0 %    MCV 85.6 78.0 - 100.0 fL    MCH 29.4 26.5 - 35.0 pg    MCHC 34.3 32.0 - 36.0 g/dL    RDW 11.6 10.0 - 15.0 %    Platelets 329.0 150.0 - 450.0 10e9/L      EKG: appears normal, NSR, normal axis, normal intervals, no acute ST/T changes c/w ischemia, no LVH by voltage criteria, unchanged from previous tracings    Revised Cardiac Risk Index (RCRI):  The patient has the following serious cardiovascular risks for perioperative complications:   - No serious cardiac risks = 0 points     RCRI Interpretation: 0 points: Class I (very low risk - 0.4% complication rate)       Precepted with Dr. Bui.       Signed Electronically by: Lyndon Velez MD  Copy of this evaluation report is provided to requesting physician.

## 2021-09-11 PROBLEM — N39.0 UTI (URINARY TRACT INFECTION): Status: ACTIVE | Noted: 2021-01-01

## 2021-09-11 PROBLEM — N30.01 ACUTE CYSTITIS WITH HEMATURIA: Status: ACTIVE | Noted: 2020-02-11

## 2021-09-11 PROBLEM — E87.1 HYPONATREMIA: Status: ACTIVE | Noted: 2021-01-01

## 2021-09-11 NOTE — ED INITIAL ASSESSMENT (HPI)
Per EMS, called for vaginal bleeding for patient who speaks [place] only and is AAO x 1, which is per her normal via family. Patient's vaginal bleeding has gotten better, but still present. Denies fever or other symptoms at this time.  Family is not here, b

## 2021-09-11 NOTE — ED PROVIDER NOTES
Patient Seen in: [facility] Emergency Department      History   Patient presents with:  Eval-G    Stated Complaint: Vaginal bleeding    Subjective:   HPI    Patient is a 15-year-old female, who arrives via EMS for evaluation of hyponatremia and vagi Head is without evidence of trauma. Pupils are equal and reactive to light. Oropharynx is pink and moist.  NECK: Neck is suppleThe trachea is midline. LUNGS: Lungs are aline, respirations are unlabored. HEART: Regular rate and rhythm.     ABDOMEN: The the EKG report. I have reviewed and agree with these readings. Sinus rhythm at 94 bpm. No acute ST segment changes.      XR CHEST AP PORTABLE  (CPT=71045)    Result Date: 9/11/2021  PROCEDURE:  XR CHEST AP PORTABLE  (CPT=71045)  TECHNIQUE:  AP chest radiogr Disposition and Plan     Clinical Impression:  Hyponatremia  (primary encounter diagnosis)     Disposition:  Admit  9/11/2021  8:31 pm    Follow-up:  No follow-up provider specified.         Medications Prescribed:  Current Discharge Medication List

## 2021-09-11 NOTE — ED QUICK NOTES
Per pt's son who is at [facility], he states that patient has been having ongoing issues with UTI's for many months. Pt was sent to ER today as he has noticed blood coming from the vagina and noticed clots passing as well.  During straight cath procedure,

## 2021-09-12 NOTE — PROGRESS NOTES
[facility]  Progress Note     Lanora Cool Patient Status:  Inpatient    1924 MRN [id number]   [facility] 4NW-A Attending Burton Gauthier MD   Hosp Day # 1 PCP Barbara Terrazas MD     Chief Complaint: Vaginal bleed input(s): CRP, HANNA, LDH, DDIMER in the last 168 hours. Imaging: Imaging data reviewed in Epic.     Medications:   • amLODIPine  5 mg Oral Daily   • clopidogrel  75 mg Oral Daily   • docusate sodium  100 mg Oral Nightly   • atorvastatin  10 mg Oral Nightl

## 2021-09-12 NOTE — H&P
[facility]  History and Physical     Keegan Bullard Patient Status:  Emergency    1924 MRN [id number]   Location [facility] Attending Chuy Ladd MD   Hosp Day # 0 PCP Mak Scott MD     Chief Co 0  nystatin-triamcinolone 876749-1.6 UNIT/GM-% External Cream, Apply topically 4 (four) times daily. , Disp: , Rfl:   docusate sodium 100 MG Oral Cap, Take 100 mg by mouth nightly., Disp: , Rfl:   Clopidogrel Bisulfate 75 MG Oral Tab, Take 75 mg by mouth da HANNA, LDH, DDIMER in the last 168 hours. Imaging: Imaging data reviewed in Epic. ASSESSMENT / PLAN:     1. Acute cystitis with hematuria v vaginal bleeding  1. Empiric antibiotics  2. Follow cultures  3. F/u transvaginal ultrasound  4.  No vaginal bl

## 2021-09-12 NOTE — SLP NOTE
SLP attempted clinical swallow evaluation. However, patient is lethargic and unable to sustain adequate level of alertness for PO intake. Will continue to monitor and evaluate when medically appropriate and schedule allows.     Addendum [id number]  Contacted by PAM

## 2021-09-12 NOTE — ED QUICK NOTES
Notified floor of specialty bed request per son to prevent pressure injury. Dr. Mel Beal still needs to be placed at this time.

## 2021-09-12 NOTE — ED QUICK NOTES
Confirmed from pt's son, Nikki Mueller who is POA that patient is consented for the transvaginal ultrasound. Pt's POA states he consent to all procedures recommended by Doctor.

## 2021-09-12 NOTE — PROGRESS NOTES
NURSING ADMISSION NOTE      Patient admitted via Cart  Oriented to room. Safety precautions initiated. Bed in low position. Call light in reach. Pt arrived from ED to room 415. Pt lethargic, opening eyes to verbal and tactile stimuli.  Nonverbal.

## 2021-09-12 NOTE — PHYSICAL THERAPY NOTE
Order received for Physical Therapy. Spoke with RN who reports patient lethargic. Attempted to evaluate patient however eyes open only briefly with sternal rub and nailbed pressure and patient resistive to movement of extremities.   Will reattempt PT serv

## 2021-09-13 NOTE — SLP NOTE
Multiple attempts made this morning to assess oropharyngeal swallow. Patient remains lethargic and unable to participate despite noxious stimuli. Patient's son reported to RN that he will be visiting around 1200.  SLP made additional attempts at 1215 and 12

## 2021-09-13 NOTE — PLAN OF CARE
Patient lethargic and disoriented x4. Pt [place] speaking only. Went to assess patient in the morning and had to vigorously sternal rub her chest to get her eyes to open up. Unable to assess her swallowing at the time.  Urine culture came back positive for

## 2021-09-13 NOTE — PHYSICAL THERAPY NOTE
Attempted to see pt. Spoke with Mario Cramer, who states that pt is very lethargic again. Cecilio Sees states that pt's family is coming shortly and are going to see if she becomes more interactive with them. Will cont to follow as appropriate.

## 2021-09-13 NOTE — PROGRESS NOTES
Pt alert and awake in the evening until close to midnight. Meds given with applesauce and tolerated well. Purewick catheter in place with adequate output. IVF infusing. Clinitron bed for sacral and heel wounds. No vaginal bleeding noted overnight.  Pt flush

## 2021-09-13 NOTE — PHYSICAL THERAPY NOTE
PHYSICAL THERAPY EVALUATION - INPATIENT     Room Number: 415/415-A  Evaluation Date: 9/13/2021  Type of Evaluation: Initial  Physician Order: PT Eval and Treat    Presenting Problem: recurrent UTI's, vaginal bleeding; B heel wounds; Sacral wound.    Cassie Mohs belt, and moves with tactile cues and encouragement, including for direction. Pt requires assist for all ADL's. Pt's son or daughter-in-law walk every 2 hours with her during the day, about 150 ft using the gait belt.  Pt was able to do that as recently as flexion 75  Left Hip flexion 85  Right Knee flexion stops at 20  Left Knee flexion stops at 30    Lower extremity strength is grossly 0 for any active motion, but significant extensor tone is currently demonstrated throughout knees, hips, and pf's.      BAL NT     VC's for expectations, reassurance. Pt is not following tactile and movement cues well enough to participate in trying further mobility today.  Per family, the pt typically is more cognitively able to comply with the tactile and min assist guiding f and this patient is demonstrating a 92.36% degree of impairment in mobility. Research supports that patients with this level of impairment may benefit from 24 hour care/supervision;     The patient scores 0/20 on the Elderly Mobility Scale, indicating patie

## 2021-09-13 NOTE — CONSULTS
[facility]    Report of Consultation    Chloe Griffith Patient Status:  Inpatient    1924 MRN [id number]   [facility] 4NW-A Attending Mitra Chaidez MD   Hosp Day # 2 PCP Fam Rodgers MD     Date of Admission:   surgical history. No family history on file. has an unknown smoking status. She has never used smokeless tobacco. She reports that she does not drink alcohol and does not use drugs.     Allergies:  No Known Allergies    Medications:    Current Facility-A 08/06/20  1159 08/21/20  1206 09/21/20  1211 11/23/20  1050 12/22/20  1130 09/11/21  1731   COLORUR Yellow Yellow Yellow Yellow Yellow Yellow Yellow Yellow Yellow Yellow Joy* Yellow Yellow Yellow   CLARITY Cloudy* Cloudy* Cloudy* Clear Hazy* Cloudy* [place] 11/23/2020    URINECUL >100,000 CFU/ML Escherichia coli (A) 09/21/2020    URINECUL >100,000 CFU/ML Escherichia coli (A) 08/21/2020    URINECUL  08/06/2020     <10,000 cfu/ml Multiple species present- probable contamination.       URINECUL No Growth at 18-24 persistent clinical concern then direct visualization is recommended.    Dictated by (CST): Jimmie Ryan MD on 9/11/2021 at 8:33 PM     Finalized by (CST): Jimmie Ryan MD on 9/11/2021 at 8:44 PM       CT ABDOMEN+PELVIS(CONTRAST ONLY)(CPT=74177)    Result Date: branches. Also, heavily calcified plaque is noted within the common femoral arteries, profunda femoral and superficial femoral arteries bilaterally. RETROPERITONEUM:  There is no adenopathy. BOWEL/MESENTERY:  There is extensive colonic diverticulosis.   [place] airspace infiltrate in the right lower lobe likely related to atelectasis. No pleural effusion. OTHER:  Negative. CONCLUSION:  1. There is a small 6 mm hyperenhancing nodule in the midline of the perineum likely within the vagina.   Recommend c closed fracture of C2 vertebra with minimal displacement, sequela     Dementia (HCC)     Altered awareness, transient     Palliative care encounter     Goals of care, counseling/discussion     Dysphagia     Palliative care by specialist     Closed fracture Urology  9/13/2021  8:49 AM          I reviewed the patient's clinical course, lab data and x-rays and agree with the plan formulated by the physician assistant.     Mago Perez MD FACS

## 2021-09-13 NOTE — PROGRESS NOTES
[facility]  Progress Note     Starla Median Patient Status:  Inpatient    1924 MRN [id number]   [facility] 4NW-A Attending Amadou Coles MD   Hosp Day # 2 PCP Lizabeth Lackey MD     Chief Complaint: Vaginal bleed 09/11/2021       Pro-Calcitonin  No results for input(s): PCT in the last 168 hours. Cardiac  Recent Labs   Lab 09/11/21  1731   TROP <0.045       Creatinine Kinase  No results for input(s): CK in the last 168 hours.     Inflammatory Markers  No results

## 2021-09-13 NOTE — DIETARY NOTE
Mickie 55     Admitting diagnosis:  Hyponatremia [E87.1]    Ht: 160 cm (5' 3\")  Wt: 46.3 kg (102 lb 1.2 oz). Body mass index is 18.08 kg/m².   Wt Readings from Last 6 Encounters:  09/11/21 : 46.3 kg (1

## 2021-09-14 NOTE — SLP NOTE
SPEECH DAILY NOTE - INPATIENT    ASSESSMENT & PLAN   ASSESSMENT  Patient is a 81 y/o female admitted with vaginal bleeding and PMHx significant for advanced dementia, HTN, and CAD.  SLP orders received to evaluate oropharyngeal swallow d/t modified diet con

## 2021-09-14 NOTE — CM/SW NOTE
09/14/21 1100   CM/SW Referral Data   Referral Source Social Work (self-referral)   Reason for Referral Discharge planning   Informant Son   Patient Info   Patient's Current Mental Status at Time of Assessment Confused or unable to complete assessment

## 2021-09-14 NOTE — PLAN OF CARE
Patient less drowsy/lethargic today. Pt able to take in small amounts of pureed food. WBC trending down. Blood cultures positive. Pt started on ampicillin. Repeat blood cultures drawn. Echo was ordered. Possible PICC line depending on blood cultures.  PT wo

## 2021-09-14 NOTE — CM/SW NOTE
Informed Bari Ortiz from [address] [place] [address] the pt will need a zak lift. Will let her know once the order is signed    Addendum: Bari Ortiz stated she will submit everything to [facility] once the order is signed.

## 2021-09-14 NOTE — PROGRESS NOTES
UROLOGY NOTE    Chart reviewed, blood cultures noted. Urine culture enterococcus. Patient switched to Ampicillin. Son returned my phonecall from yesterday. Reviewed imaging results, clinical findings, and recommendations.   He notes that patient has ch

## 2021-09-14 NOTE — PROGRESS NOTES
Pt is drowsy and nonverbal. Pt first assessed alert with family feeding the patient. Otherwise pt responds only to painful stimulus. Pupils are fixed and non reactive. CT of brain completed. Reported to MD, Sepsis BPA fired and was also reported.  Pt refuse

## 2021-09-14 NOTE — PROGRESS NOTES
[facility]  Progress Note     Deric Morris Patient Status:  Inpatient    1924 MRN [id number]   [facility] 4NW-A Attending Felipe Henriquez MD   Hosp Day # 3 PCP Taz Paz MD     Chief Complaint: Vaginal bleed --   --    TP 7.7  --   --   --   --     < > = values in this interval not displayed. Estimated Creatinine Clearance: 28 mL/min (based on SCr of 0.84 mg/dL).     Recent Labs   Lab 09/11/21  1731   PTP 13.5   INR 1.01            COVID-19 Lab Results

## 2021-09-14 NOTE — CONSULTS
INFECTIOUS DISEASE CONSULTATION    Salamancaartis Trevino Patient Status:  Inpatient    1924 MRN [id number]   [facility] 4NW-A Attending Shahla Meza MD   Hosp Day # 3 PCP Betsy Peck encounter. sodium chloride 1 g Oral Tab, Take 1 g by mouth 3 (three) times daily with meals. , Disp: , Rfl:   Multiple Vitamins-Minerals (MULTI-VITAMIN/MINERALS) Oral Tab, Take 1 tablet by mouth daily. , Disp: , Rfl:   Clopidogrel Bisulfate 75 MG Oral Tab, 09/12/21  1229 09/13/21  0722 09/14/21  0624   *   < > 111* 183* 252*   BUN 13   < > 11 18 25*   CREATSERUM 0.67   < > 0.57 0.81 0.84   GFRAA 85   < > 90 70 67   GFRNAA 74   < > 78 61 58*   CA 9.9   < > 9.6 9.7 9.4   ALB 3.5  --   --   --   --    NA Prediabetes     UTI (urinary tract infection)     Bladder wall thickening      ASSESSMENT/PLAN:  1. UTI/with hematuria ( vs vaginal bleeding)  Per staff bleeding has stopped    Urine and blood cultures showing Enterococcus    -send repeat blood cultures  -

## 2021-09-14 NOTE — PHYSICAL THERAPY NOTE
PHYSICAL THERAPY TREATMENT NOTE - INPATIENT    Room Number: 415/415-A     Session: 1     Number of Visits to Meet Established Goals: Trial; 4    Presenting Problem: recurrent UTI's, vaginal bleeding; B heel wounds; Sacral wound.      History related to c (in b/s chair)           Static Standing: Dependent  Dynamic Standing: Dependent    ACTIVITY TOLERANCE                         O2 WALK                    AM-PAC '6-Clicks' INPATIENT SHORT FORM - BASIC MOBILITY  How much difficulty does the patient currentl trial of the pt in her typical environment. Son and daughter-in-law confirm that this is their preferred plan, rather than a transition to a [address] [place] [address] Drive.  I am not recommending KRISTIN because the pt is not going to progress in a KRISTIN setting education and trial of therapy in typical environment for the pt. Pt will cont to benefit from skilled inpt pt to address the above problems and facilitate return to baseline.      Putting inpt OT on consult, as pt at baseline uses commode for toileting

## 2021-09-15 NOTE — OCCUPATIONAL THERAPY NOTE
OCCUPATIONAL THERAPY EVALUATION - INPATIENT     Room Number: 415/415-A  Evaluation Date: 9/15/2021  Type of Evaluation: Initial  Presenting Problem: UTI, hyponatremia, vaginal bleed    Physician Order: IP Consult to Occupational Therapy  Reason for Therapy bed  ADL:  Eating: Total (A) seated EOB, opens mouth to drink nectar thick liquids with visual stimuli of spoon by face, coughing noted after swallowing.  RN aware  Grooming: NT  UB dressing: NT  LB dressing: total (A) to don socks  Toileting: NT    Functio moves with tactile cues and encouragement, including for direction. Pt requires assist for all ADL's. Pt's son or daughter-in-law walk every 2 hours with her during the day, about 150 ft using the gait belt.  Pt was able to do that as recently as last week, 17.07    Additional tests:                                    PLAN  OT Treatment Plan: UE strengthening/ROM;  Patient/Family education; Patient/Family training  Rehab Potential : Guarded  Frequency (Obs): 3x/week  Number of Visits to Meet Established Goals:

## 2021-09-15 NOTE — PLAN OF CARE
Pt drows/lethargic this AM. VSS. Pt not taking crushed meds this AM, and continued to sleep. No wet cough observed this AM, but hospitalist aware pt has intermittent wet cough. Fluid rate slowed per hospitalist. Lung sounds clear. IV abx per MAR.  Lula wit

## 2021-09-15 NOTE — SLP NOTE
ADULT SWALLOWING EVALUATION    ASSESSMENT    ASSESSMENT/OVERALL IMPRESSION:  Patient is a 79 y/o female admitted with vaginal bleeding and PMHx significant for advanced dementia and HTN.  SLP orders received previously but evaluation was unable to be comple hematuria    Benign essential HTN    UTI (urinary tract infection)    Bladder wall thickening    Enterococcal bacteremia      Past Medical History  Past Medical History:   Diagnosis Date   • C[facility]    • Dementia ([facility]    • Diabetes ([facility] Meet Established Goals: 1  Follow Up Needed (Documentation Required): Yes  SLP Follow-up Date: 09/17/21    Thank you for your referral.   If you have any questions, please contact BECKY Barber

## 2021-09-15 NOTE — PROGRESS NOTES
[facility]PITAL[facility]  Progress Note     [facility] Patient Status:  Inpatient    1924 MRN [id number]   [facility] 4NW-A Attending Marva Herzog MD   Hosp Day # 4 PCP Nkechi Spangler MD     Chief Complaint: Vaginal bleed CO2 26.0   < > 22.0 25.0 26.0   ALKPHO 100  --   --   --   --    AST 9*  --   --   --   --    ALT 16  --   --   --   --    BILT 0.6  --   --   --   --    TP 7.7  --   --   --   --     < > = values in this interval not displayed.        Estimated Creatinin

## 2021-09-15 NOTE — PROGRESS NOTES
Pt alert and calm. Pt ate 1 and a half yogurt and 1/2 ice cream cup with meds. Pt with wet nonproductive cough. Lung sounds clear at this time. Pt mid to high 90s on room air. Pt repositioned and sitting at [facility] 60 degrees. IV ampicillin per ID.  Pt incontin

## 2021-09-15 NOTE — PHYSICAL THERAPY NOTE
PHYSICAL THERAPY TREATMENT NOTE - INPATIENT    Room Number: 415/415-A     Session: 2   Number of Visits to Meet Established Goals: Trial; 4    Presenting Problem: recurremt UTI, vaginal bleeding, B heel wounds, sacral wound     History related to current Standing: Dependent    ACTIVITY TOLERANCE                         O2 WALK       AM-PAC '6-Clicks' INPATIENT SHORT FORM - BASIC MOBILITY  How much difficulty does the patient currently have. ..  -   Turning over in bed (including adjusting bedclothes, sheets session/findings; All patient questions and concerns addressed; Alarm set    ASSESSMENT   Pt is less alert compared to last session. Remains flat affect and non-verbal, but does make some eye contact to people who enter the room.  Pt with consistent tone co

## 2021-09-16 NOTE — PLAN OF CARE
Pt lethargic and drowsy. Appears to be pain free. VSS, BP normal for pt and pulse normal. Dry cough noted, mostly after pt takes a bite of food. Otherwise tolerating diet. Tolerated meds crushed in apple sauce. IVF infusing per MAR. IV abx per MAR.  D-dimer

## 2021-09-16 NOTE — CM/SW NOTE
SW spoke to son today re: eventual dc plans. He initially stated that he was told by ID that pt will need IV abs and that they could not handle that at home- but after further discussion he said they would do IV abs at home.   [facility] RN, PT, OT orders sent to

## 2021-09-16 NOTE — PROGRESS NOTES
[facility]                INFECTIOUS DISEASE PROGRESS NOTE    Ladarius Pop Patient Status:  Inpatient    1924 MRN [id number]   [facility] 4NW-A Attending Sudeep Knutson MD   Hosp Day # 5 PCP Jeannette Cole MD Status: None (Preliminary result)    Collection Time: 09/14/21 11:51 AM    Specimen: Blood,peripheral   Result Value Ref Range    Blood Culture Result No Growth 1 Day N/A   2.  Urine Culture, Routine     Status: Abnormal    Collection Time: 09/11/21  5:31 P

## 2021-09-16 NOTE — PROGRESS NOTES
[facility]                INFECTIOUS DISEASE PROGRESS NOTE    Orient Iliff Patient Status:  Inpatient    1924 MRN [id number]   [facility] 4NW-A Attending Nick Garcia MD   Hosp Day # 5 PCP Keerthi Clifton MD 6.9    < > = values in this interval not displayed. No results found for: [facility] Encounter on 09/11/21   1.  Blood Culture     Status: None (Preliminary result)    Collection Time: 09/14/21 11:51 AM    Specimen: Blood,periphe

## 2021-09-16 NOTE — PROGRESS NOTES
[facility]PITAL[facility]  Progress Note     Arabella Stager Patient Status:  Inpatient    1924 MRN [id number]   [facility] 4NW-A Attending Traci Alvarado MD   Hosp Day # 5 PCP Sarah Bergeron MD     Chief Complaint: Vaginal bleed 13   < > 18 25* 27*   CREATSERUM 0.67   < > 0.81 0.84 0.67   GFRAA 85   < > 70 67 85   GFRNAA 74   < > 61 58* 74   CA 9.9   < > 9.7 9.4 9.9   ALB 3.5  --   --   --   --    *   < > 130* 129* 131*   K 4.3   < > 3.5 3.9 4.1   CL 91*   < > 100 99 101   C sq  · CODE status: full -  has been discussed with family and they remain focused on aggressive care. Will try to touch base with them after imaging results.    · Guevara: no  · Central line: no  · If COVID testing is negative, may discontinue isolation: yes

## 2021-09-16 NOTE — SLP NOTE
SLP visit attempted to assess diet tolerance following evaluation yesterday. Patient drowsy and largely unarousable in bed at time of my visit. Discussed with RN, reported patient able to take medications without overt s/s of aspiration.  SLP will continue

## 2021-09-16 NOTE — PLAN OF CARE
I updated pt son on CT/doppler results. He is very concerned about how to keep her from getting UTIs in the future. He is aware of urology recs of methanamine after completing treatment for bacteremia.

## 2021-09-17 NOTE — PHYSICAL THERAPY NOTE
PHYSICAL THERAPY TREATMENT NOTE - INPATIENT    Room Number: 415/415-A     Session: 3   Number of Visits to Meet Established Goals: Trial; 4    Presenting Problem: recurrent UTIs, vaginal bleeding, B heel wounds, sacral wound    History related to current Static Sitting: Fair -  Dynamic Sitting: Poor           Static Standing: Dependent  Dynamic Standing: Dependent    ACTIVITY TOLERANCE                         O2 WALK  Oxygen Therapy  SPO2% on Oxygen at Rest:  (WNL)  At rest oxygen fl attempt STS with pt for education. Pt attempt to take a step during second attempt. Therapeutic discussion with family regarding equipment needs, caregiver/ HH PT, safety, recommendations for mobility at home, self care, and overall POC.  CARMELITA and RN xaviera donned by Student, Faviola Landry and OT : Mask, gloves, eyewear   PPE donned by Patient: None  PPE donned by family: Mask       This note edited by J Carlos Ty for PT student, Faviola Landry, who performed PT treatment session.   This PT not in room fo

## 2021-09-17 NOTE — PROGRESS NOTES
[facility]                INFECTIOUS DISEASE PROGRESS NOTE    Joe Galvez Patient Status:  Inpatient    1924 MRN [id number]   [facility] 4NW-A Attending Eli Parker MD   Hosp Day # 6 PCP Gonzalez Lake MD Encounter on 09/11/21   1. Blood Culture     Status: None (Preliminary result)    Collection Time: 09/14/21 11:51 AM    Specimen: Blood,peripheral   Result Value Ref Range    Blood Culture Result No Growth 3 Days N/A   2.  Urine Culture, Routine     Status:

## 2021-09-17 NOTE — SLP NOTE
SPEECH DAILY NOTE - INPATIENT    ASSESSMENT & PLAN   ASSESSMENT  Pt seen for dysphagia tx to assess tolerance with recommended diet, ensure proper utilization of aspiration precautions and provide pt/family education. Son and son's wife were present.  Pt re Recommendations/Plan: 24 hour care/supervision    Treatment Plan  Treatment Plan/Recommendations: No further inpatient SLP service warranted    Interdisciplinary Communication: Discussed with RN            GOALS  GOALS  Goal #1 The patient will tolerate pu

## 2021-09-17 NOTE — VASCULAR ACCESS
Consulted for a Midline. Attempted to see pt at 1400. Pt's nurse and family would prefer that pt work with PT on lift training. They decline midline at this time. Will attempt to return to see pt at another time if able. Dr Rosita Escalera informed at 26.

## 2021-09-17 NOTE — PROGRESS NOTES
[facility]PITAL[facility]  Progress Note     Joe Galvez Patient Status:  Inpatient    1924 MRN [id number]   [facility] 4NW-A Attending Eli Parker MD   Hosp Day # 6 PCP Gonazlez Lake MD     Chief Complaint: Vaginal bleed CREATSERUM 0.67   < > 0.67 0.69 0.71   GFRAA 85   < > 85 84 83   GFRNAA 74   < > 74 73 72   CA 9.9   < > 9.9 10.1 9.9   ALB 3.5  --   --  2.5*  --    *   < > 131* 136 141   K 4.3   < > 4.1 4.7 3.6   CL 91*   < > 101 107 106   CO2 26.0   < > 26.0 23 exam - especially in mornings - more alert later in day per staff. Npo if lethargic  7.  Hypertension, improved  1. norvasc  8. ? CAD  1. plavix    Quality:  · DVT Prophylaxis: heparin sq  · CODE status: full -  has been discussed with family  · Guevara: no

## 2021-09-17 NOTE — PLAN OF CARE
Pt alert, disoriented x4 and nonverbal. /101 this AM, PRN hydralazine administered per MAR. Recheck /79. Pt on 1L NC with sats in mid to high 90s. Otherwise no difficulty breathing. Pt tolerated meds crushed in apple sauce. IV abx per MAR.  Pt u

## 2021-09-17 NOTE — OCCUPATIONAL THERAPY NOTE
OCCUPATIONAL THERAPY TREATMENT NOTE - INPATIENT     Room Number: 415/415-A  Session: 1   Number of Visits to Meet Established Goals: 2    Presenting Problem: UTI, hyponatremia, vaginal bleed    History related to current admission: Patient is a 80year old Total  -   Eating meals?: Total    AM-PAC Score:  Score: 6  Approx Degree of Impairment: 100%  Standardized Score (AM-PAC Scale): 17.07  CMS Modifier (G-Code): CN    FUNCTIONAL TRANSFER ASSESSMENT  Supine to Sit : Not tested  Sit to Stand: Dependent assist training  Rehab Potential : Guarded  Frequency (Obs): 3x/week      OT Goals:  All goals met 9/17  ADL Goals   Family will verbalize or demonstrate safe strategies to assist pt with:  LB dressing  Bathing  Toileting     Functional Transfer Goals  Family will

## 2021-09-18 NOTE — PROGRESS NOTES
[facility]ITAL[facility]  Progress Note     Varsha Mcnair Patient Status:  Inpatient    1924 MRN [id number]   [facility] 4NW-A Attending Mariposa Kan MD   Hosp Day # 7 PCP Ruchi Burk MD     Chief Complaint: Vaginal bleed < > 23.0 31.0 30.0   ALKPHO 100  --  78  --   --    AST 9*  --  14*  --   --    ALT 16  --  18  --   --    BILT 0.6  --  0.5  --   --    TP 7.7  --  6.9  --   --     < > = values in this interval not displayed.        Estimated Creatinine Clearance: 29.4 mL

## 2021-09-18 NOTE — HOME CARE LIAISON
Chart accessed to review referral, per CARMELITA Horner patient is current with [id number] [facility]. [facility] will remain pending until confirmed.

## 2021-09-18 NOTE — PLAN OF CARE
Problem: RESPIRATORY - ADULT  Goal: Achieves optimal ventilation and oxygenation  Description: INTERVENTIONS:  - Assess for changes in respiratory status  - Assess for changes in mentation and behavior  - Position to facilitate oxygenation and minimize r 0.8, Na 142, K 3.7. Pt's BP remains elevated. 186/90 this Noon time. Pt. Given hydralazine. Repeat SBP in the 160s. Pt. Incontinent of urine. No noted vaginal bleeding.       Problem: NEUROLOGICAL - ADULT  Goal: Achieves stable or improved neurological stat

## 2021-09-18 NOTE — PROGRESS NOTES
[facility]                INFECTIOUS DISEASE PROGRESS NOTE    Luanne Saini Patient Status:  Inpatient    1924 MRN [id number]   [facility] 4NW-A Attending Filippo Branham MD   Hosp Day # 7 PCP Sylvania Sicard, MD [facility] Encounter on 09/11/21   1.  Blood Culture     Status: None (Preliminary result)    Collection Time: 09/14/21 11:51 AM    Specimen: Blood,peripheral   Result Value Ref Range    Blood Culture Result No Growth 3 Days N/A   2. Ur

## 2021-09-18 NOTE — CM/SW NOTE
Sent signed zak lift order to [facility] stating they are unable to accept due to a staff shortage. Son previously stated they are current w/the pt. Left [facility] a message in regards to this.      Also sent a referral to [facility] in the [place]

## 2021-09-19 NOTE — DISCHARGE SUMMARY
[facility] HOSPITALIST  DISCHARGE SUMMARY     Starla Median Patient Status:  Inpatient    1924 MRN [id number]   [facility] 4NW-A Attending Amadou Coles MD   [address] Day # 8 PCP Lizabeth Lackey MD     Date of Admission: 2021 · no    Consultants:  • ID     Discharge Medication List:     Discharge Medications      START taking these medications      Instructions Prescription details   sodium chloride 0.9% SOLN 100 mL with Ampicillin Sodium 2 g SOLR 2 g      Inject 2 g into the Follow-up appointment:   MD [address] [facility] [phone]      cystoscopy/follow up recurrent UTI    Appointments for Next 30 Days 9/19/2021 - 10/19/2021              Date Arrival Time Visit Type Nicolette

## 2021-09-19 NOTE — PLAN OF CARE
Pt awaiting dc after 1800 dose of ampicillin. Spoke w/ son Nikki Mueller about transfer and [employer]. No vaginal bleeding noted when changing pt this afternoon. Pt oral intake increasing at meals when fed and encouraged to eat. Call light in reach.  Will cont

## 2021-09-19 NOTE — CM/SW NOTE
Spoke with Lupe Peterson from [facility] regarding need to clarify plan for [facility] provider. Anticipate possible DC today to home with IV abx. Pt's family indicating pt current with [facility], however provider declined pt in [facility] due to staffing.   Spoke

## 2021-09-19 NOTE — CM/SW NOTE
Contacted by pt's RN who confirmed plan for DC to home this evening.   IV abx dose at 6pm and request for ambulance transport home [address] with Memory Cassy from [facility] who will arrange for delivery of medication/supplies to pt's home after 9pm this ev

## 2021-09-19 NOTE — PLAN OF CARE
1830 Pt. Given dose of ampicillin. Cleared w/ ID for next dose of ampicillin in am. Medical transport arranged for 1900. Pt's son and dtr-in-law in this PM. Reviewed DC instructions w/ pt's family. Family verbalizes understanding.  Right extended IV dc'd an

## 2021-09-19 NOTE — PLAN OF CARE
Problem: UTI, hyponatremia  Data: Patient drowsy, alert at times, non verbal throughout the night. Patient does not appear to be in pain, purewick in place with adequate urine output noted. Vital signs stable.    Action: Due medications given, IV abx overni patient's medication profile  - Implement strategies to promote bladder emptying  Outcome: Progressing

## 2021-09-20 NOTE — PLAN OF CARE
Pt received scheduled to dc last night. Stayed inpatient due to low o2 levels. AM labs showed elevated Na 155 and low K 2.7. Dr. Annemarie Hawkins notified and nephrology consulted. Pt started on IV dextrose and K per electrolyte replacement protocol. Labs redrawn.  R

## 2021-09-20 NOTE — PROGRESS NOTES
[facility]ITAL[facility]  Progress Note     Gudelia Pang Patient Status:  Inpatient    1924 MRN [id number]   [facility] 4NW-A Attending Lilly Davis MD   Hosp Day # 9 PCP Carmen Lawson MD     Chief Complaint: Vaginal bleed this interval not displayed. Estimated Creatinine Clearance: 27 mL/min (based on SCr of 0.87 mg/dL). No results for input(s): PTP, INR in the last 168 hours.          COVID-19 Lab Results    COVID-19  Lab Results   Component Value Date    COVID19 N

## 2021-09-20 NOTE — CONSULTS
[facility]  Report of Consultation    Ermias Tristan Patient Status:  Inpatient    1924 MRN [id number]   [facility] 4NW-A Attending Yoandy Lucas MD   Hosp Day # 9 PCP Rachele Ruiz MD     Reason for Consultation:  Hyp Facility-Administered Medications:   •  dextrose 5% infusion, , Intravenous, Continuous  •  amLODIPine (NORVASC) tab 10 mg, 10 mg, Oral, Daily  •  Senna (SENOKOT) tab 8.6 mg, 8.6 mg, Oral, BID PRN  •  Heparin Sodium (Porcine) 5000 UNIT/ML injection 5,000 U interactive in no distress  HEENT: No scleral icterus, dry mucous membranes  Neck: Supple  Cardiac: Regular rate and rhythm, S1, S2 normal, no murmur or rub  Lungs: Clear without wheezes, rales, rhonchi.     Abdomen: Soft, non-tender. + bowel sounds  Extrem oral water intake as well    #2. Hypokalemia-again I suspect this is also nutritional.  She is receiving potassium supplementation and this will be followed closely    #3.   Metabolic alkalosis-this is likely due to contraction alkalosis and has improved s

## 2021-09-20 NOTE — PROGRESS NOTES
[facility]                INFECTIOUS DISEASE PROGRESS NOTE    Gab Sanon Patient Status:  Inpatient    1924 MRN [id number]   [facility] 4NW-A Attending Mamadou Rodriguez MD   Hosp Day # 9 PCP Valla Dubin, MD displayed. No results found for: [facility] Encounter on 09/11/21   1.  Blood Culture     Status: None    Collection Time: 09/14/21 11:51 AM    Specimen: Blood,peripheral   Result Value Ref Range    Blood Culture Result No Growth

## 2021-09-20 NOTE — PROGRESS NOTES
Patient was for DC tonight to home. Transport came in the room and patient's 02 sat is on 80's  On room air to low 90's. Hospitalist at bedside,  Patient not in distress. Per MD hold on DC for tonight. New orders received and noted.  Son was made aware by

## 2021-09-21 NOTE — PROGRESS NOTES
[facility]  Progress Note     Varsha Mcnair Patient Status:  Inpatient    1924 MRN [id number]   [facility] 4NW-A Attending Mariposa Kan MD   Hosp Day # 8 PCP Ruchi Burk MD     Chief Complaint: Vaginal bleed ALT 18  --   --   --   --   --   --    BILT 0.5  --   --   --   --   --   --    TP 6.9  --   --   --   --   --   --     < > = values in this interval not displayed. Estimated Creatinine Clearance: 27 mL/min (based on SCr of 0.87 mg/dL).     No resul MD

## 2021-09-21 NOTE — PLAN OF CARE
Nephro was paged  and made aware of K of 3.0 this pm. New orders received and noted. 40 K rider was given and Potassium protocol initiated. Patient on 1L o2, O2 sat 98%. Will continue to  wean off O2 and monitor.      2300; attempted to wean patient off O2

## 2021-09-21 NOTE — PLAN OF CARE
Lethargic & sleeping all day. Unable to wake her up for meals & meds this am. SPo2 100% on RA. Kept comfortable,clean & dry. MOitored frequently. Vital signs stable. 40mEq Potassium IV replacement tendered.  Due IV antibiotics infusing, with no adverse reac collaborating with pharmacy to review patient's medication profile  Outcome: Progressing  Goal: Maintains adequate nutritional intake (undernourished)  Description: INTERVENTIONS:  - Monitor percentage of each meal consumed  - Identify factors contributing ADULT  Goal: Absence of urinary retention  Description: INTERVENTIONS:  - Assess patient’s ability to void and empty bladder  - Monitor intake/output and perform bladder scan as needed  - Follow urinary retention protocol/standard of care  - Consider colla

## 2021-09-21 NOTE — PLAN OF CARE
Patient's son & daughter in law at bedside. They managed to wake patient up with more stimulation, positioned patient upright sitting in the bed,massaged her face & gently rubbed her chest & back. Patient is wide awake.  Remains non verbal,was fed by family

## 2021-09-21 NOTE — PROGRESS NOTES
[facility]  Progress Note    Abimbola Bradley Patient Status:  Inpatient    1924 MRN [id number]   [facility] 4NW-A Attending Trever Walker MD   Hosp Day # 10 PCP Pham Elliott MD     No acute events  Patient lethargic; u mucous membranes  Neck: Supple  Cardiac: Regular rate and rhythm, S1, S2 normal, no murmur or rub  Lungs: Clear without wheezes, rales, rhonchi. Abdomen: Soft, non-tender. + bowel sounds  Extremities: Without clubbing, cyanosis or edema.   Neurologic:  m

## 2021-09-21 NOTE — PLAN OF CARE
Patient was fed w/ pureed food & thickened liquids bu family who was at the bedside. Patient ate 100% per patient's son. Weaned off from O2 at 1630. Patient has been monitored closely,O2 sats 92-95% on RA. NO SOB noted.  Head of bed kept elevated above 30 deg

## 2021-09-21 NOTE — PROGRESS NOTES
[facility]                INFECTIOUS DISEASE PROGRESS NOTE    Trenton Hightower Patient Status:  Inpatient    1924 MRN [id number]   [facility] 4NW-A Attending Gumaro Mccormick MD   Hosp Day # 10 PCP Yuri Bradshaw MD --    AST 14*  --   --   --   --   --   --    ALT 18  --   --   --   --   --   --    BILT 0.5  --   --   --   --   --   --    TP 6.9  --   --   --   --   --   --     < > = values in this interval not displayed.        No results found for: Maritza Guillory hypokalemia, free water, hypotonic saline per hospitalist          Jagjit Ventura MD, MD  [facility]  [phone]

## 2021-09-22 NOTE — PROGRESS NOTES
[facility] H[facility]PITAL[facility]  Progress Note     Claudia Camarillo Patient Status:  Inpatient    1924 MRN [id number]   [facility] 4NW-A Attending Clau Rice MD   Hosp Day # 6 PCP Jean Batista MD     Chief Complaint: Vaginal bleed     S --   --   --    ALT 18  --   --   --   --   --   --    BILT 0.5  --   --   --   --   --   --    TP 6.9  --   --   --   --   --   --     < > = values in this interval not displayed.        Estimated Creatinine Clearance: 30.1 mL/min (based on SCr of 0.78 mg/

## 2021-09-22 NOTE — PROGRESS NOTES
[facility]                INFECTIOUS DISEASE PROGRESS NOTE    Joe Galvez Patient Status:  Inpatient    1924 MRN [id number]   [facility] 4NW-A Attending Eli Parker MD   Hosp Day # 11 PCP Gonzalez Lake MD --    AST 14*  --   --   --   --   --   --    ALT 18  --   --   --   --   --   --    BILT 0.5  --   --   --   --   --   --    TP 6.9  --   --   --   --   --   --     < > = values in this interval not displayed.        No results found for: VANCT  Microbiol through 9/29    2. Hypernatremia, hypokalemia, free water, hypotonic saline per hospitalist    3.  Respiratory on 02 stable          Sohan Zimmer MD, MD  [facility]  [phone]

## 2021-09-22 NOTE — PLAN OF CARE
I updated son on sodium, glucose/a1c and plans to stop dextrose IVF. He asked that we try hard to awaken her for po intake. I d/w him we sternal rub every morning and do not want to cause pain or discomfort. Will continued to try to stimulate her.   He i

## 2021-09-22 NOTE — PROGRESS NOTES
Pt is on room air and no sob noted. Pt on clinitron bed. Pt has no signs of sob or difficulty breathing.  No sign so pain

## 2021-09-22 NOTE — PROGRESS NOTES
[facility]     Nephrology Progress Note    Leslie Single Patient Status:  Inpatient    1924 MRN [id number]   [facility] 4NW-A Attending Saskia Gleason MD   Hosp Day # 6 PCP Kenan Daily MD       SUBJECTIVE:  Placed on 10.3*   *  --  268* 247* 269*  --  367*    < > = values in this interval not displayed. Recent Labs   Lab 09/16/21  1217   ALT 18   AST 14*   ALB 2.5*       No results for input(s): PGLU in the last 168 hours.     Meds:   dextrose 5% infusion,

## 2021-09-22 NOTE — PROGRESS NOTES
Pt's oxygen level decreased and pt repositioned and oxygen 2 liters nasal cannula placed. Pt 's oxygen level is 94% at this time.  Continue to monitor oxygen level

## 2021-09-23 NOTE — PROGRESS NOTES
[facility]     Nephrology Progress Note    Devon Trevino Patient Status:  Inpatient    1924 MRN [id number]   [facility] Attending Korey Carrion MD   Hosp Day # 12 PCP Harinder Valdez MD       SUBJECTIVE:  [place] 10.4*   MG  --   --   --   --   --  2.3  --    *  --  269*  --  367* 363* 105*    < > = values in this interval not displayed.        Recent Labs   Lab 09/16/21  1217   ALT 18   AST 14*   ALB 2.5*       Recent Labs   Lab 09/22/21  1149 09/22/21  1453 hypernatremia     2. Hypokalemia-replace and follow      3.   Metabolic alkalosis-this is likely due to contraction alkalosis and has i resolved with IV fluids.       4.  Enterococcal bacteremia-she is on ampicillin and ID is following      Nephrology will

## 2021-09-23 NOTE — PROGRESS NOTES
Pt is lethargic today but more awake than yesterday. Family at bedside and pt did take her medicine and drank a whole cup of thickened water. Pt is on room air and oxygen saturation is 94%.  Safety precautions in place and call light in reach

## 2021-09-23 NOTE — PROGRESS NOTES
[facility]  Progress Note     Keegan Bullard Patient Status:  Inpatient    1924 MRN [id number]   [facility] 4NW-A Attending Kamari Malhotra MD   Hosp Day # 12 PCP Mak Scott MD     Chief Complaint: Vaginal bleed     S --   --   --   --     < > = values in this interval not displayed. Estimated Creatinine Clearance: 38.5 mL/min (based on SCr of 0.61 mg/dL). No results for input(s): PTP, INR in the last 168 hours.          COVID-19 Lab Results    COVID-19  Lab Res my concerns that she will not be able to maintain her hydration/sodium level due to poor oral intake. He requested thin liquids which we reviewed is not safe for her and SLP following.       RAJESH Kingsley   10:41 AM

## 2021-09-23 NOTE — PROGRESS NOTES
Pt is lethargic, sleeping most of the day. Non-verbal, family  Comes to feed her and slaps her near her right shoulder and talks loudly to her to wake her up. this writer encouraged them to wait until she is fully awake.  Repositioned in bed frequently , HOB

## 2021-09-24 PROBLEM — J15.9 PNEUMONIA, BACTERIAL: Status: ACTIVE | Noted: 2020-02-11

## 2021-09-24 NOTE — PLAN OF CARE
Lethargic,open her eyes at times. Tolerated pureed diet, nectar thick liquid when fully awake. Ate 100 % dinner fed by her family members. Oral care done. Tachypnea, desats to 87% on roomair. Repositioned, oxygen applied. Sating above 95% on 2 LNC.  Reposit

## 2021-09-24 NOTE — CM/SW NOTE
CARMELITA spoke w/Nasra who confirmed the pt's ABX are still good and can be used. The pt does not need anything more from [facility] at this time. CARMELITA spoke w/[facility] [phone] who stated they would be able to see the pt tomorrow to serve the pt.     B

## 2021-09-24 NOTE — PROGRESS NOTES
[facility]     Nephrology Progress Note    Starla Median Patient Status:  Inpatient    1924 MRN [id number]   [facility] 4NW-A Attending Pratima Dumas MD   Hosp Day # 15 PCP Lizabeth Lackey MD       SUBJECTIVE:  [place] --   --   --  2.3  --    *  --  269*  --  367* 363* 105*    < > = values in this interval not displayed. No results for input(s): ALT, AST, ALB, AMYLASE, LIPASE, LDH in the last 168 hours.     Invalid input(s): ALPHOS, TBIL, DBIL, TPROT    Rec stable today after discontinuing intravenous fluids. Will need to continue to encourage oral water intake to prevent ongoing hypernatremia     2. Hypokalemia-replace and follow      3.   Metabolic alkalosis-this is likely due to contraction alkalosis and

## 2021-09-24 NOTE — PROGRESS NOTES
[facility]  Progress Note     Trenton Hightower Patient Status:  Inpatient    1924 MRN [id number]   [facility] 4NW-A Attending Austyn Carreon MD   [facility] Day # 15 PCP Yuri Bradshaw MD     Chief Complaint: Vaginal bleed in the last 168 hours. Creatinine Kinase  No results for input(s): CK in the last 168 hours. Inflammatory Markers  No results for input(s): CRP, HANNA, LDH, DDIMER in the last 168 hours. Imaging: Imaging data reviewed in Epic.     Medications:   • in today.  Discussed with son at great detail. Will monitor today. CXR yesterday with signs of PNA. Pt w/ some cough when eating. Will have SLP eval.  Discussed overall care with son (POA) in detail. He has decided to proceed with DNAR/selective status.

## 2021-09-24 NOTE — PROGRESS NOTES
[facility]                INFECTIOUS DISEASE PROGRESS NOTE    Chloe Griffith Patient Status:  Inpatient    1924 MRN [id number]   [facility] 4NW-A Attending Mitra Chaidez MD   Hosp Day # 15 PCP Fam Rodgers MD 09/11/21  5:31 PM    Specimen: Urine, clean catch   Result Value Ref Range    Urine Culture 50,000-99,000 CFU/ML Enterococcus species not VRE (A) N/A         Radiology        Problem list reviewed:  Patient Active Problem List:     Traumatic closed fractur

## 2021-09-24 NOTE — CM/SW NOTE
RN stating she is not sure when the pt will dc. Kings Araiza from [facility] stated the IV medication was delivered on Sunday. Sent her a message to see if the medication is still good. Sent [facility] updated clinicals.  Asked for an O2 walk prior to dc to see

## 2021-09-25 NOTE — PROGRESS NOTES
[facility]     Hospitalist Progress Note     Leslie Single Patient Status:  Inpatient    1924 MRN [id number]   [facility] 4NW-A Attending Simeon Rhodes MD   Psychiatric Day # 15 PCP Kenan Daily MD     Chief Complaint: con DDIMER in the last 168 hours. Imaging: Imaging data reviewed in Epic.     Medications:   • insulin detemir  7 Units Subcutaneous Daily   • Insulin Aspart Pen  1-68 Units Subcutaneous TID CC and HS   • ampicillin  2 g Intravenous Q8H   • amLODIPine  10 mg

## 2021-09-25 NOTE — PLAN OF CARE
Patient remains with drowsiness. Family/Son requesting to speak with Dr. Bryson Lassiter re DC POC. Writer informed Dr. Bryson Lassiter. Discharge canceled, SLP consulted. Patient remains on specialized bed to aid in the prevention of skin breakdown. Patient resting in bed.

## 2021-09-25 NOTE — SLP NOTE
Order received for re-evaluation of swallow function. This pt was seen on this particular admit by our ST service.  Pt was originally consulted on 9/12/21, but due to lethargy, the pt was unable to evaluated for a least restrictive diet consistency until 9/

## 2021-09-26 NOTE — SLP NOTE
Attempted to perform swallow evaluation with patient who has significant history of severe dysphagia. Patient too lethargic to participate in a safe PO exam at this time. Will re-check patient tomorrow 9/27 and evaluate if appropriate.

## 2021-09-26 NOTE — PROGRESS NOTES
Pt drowsy, opens eyes at times. Does not follow verbal commands. Oral medications held this morning, risk for aspiration. Scheduled IV antibiotic given as ordered. accuchecks  today. Scheduled levimir held, started on D5/LR at 75cc/hr.   Dr. Elin Rudd

## 2021-09-26 NOTE — PROGRESS NOTES
[facility]     Hospitalist Progress Note      Patient Status:  Inpatient    1924 MRN [id number]   [facility] 4NW-A Attending Rafa Carrillo MD   [facility] Day # 13 PCP Levi Munroe MD     Chief Complaint: con DDIMER in the last 168 hours. Imaging: Imaging data reviewed in Epic.     Medications:   • Insulin Aspart Pen  1-68 Units Subcutaneous TID CC and HS   • ampicillin  2 g Intravenous Q8H   • amLODIPine  10 mg Oral Daily   • Heparin Sodium (Porcine)  5,000

## 2021-09-26 NOTE — PROCEDURES
[facility] - ELECTROENCEPHALOGRAM (EEG) REPORT  Patient Name:  Allison Zavala   MRN / CSN:  [id number] / [id number]   Date of Birth / Age:  1/21/1924 /  80year old   Encounter Date:  9/26/21         METHODS:  Twenty-two electrodes were applied according to recorded and clinical correlation is suggested.      Report covers  Start 9/26/21 at 1628  End 9/26/21 at 350 Gale Gonzalez:  Bhakti Salinas [address] Neurology

## 2021-09-27 NOTE — PLAN OF CARE
Pt lethargic all day. Nonverbal with staff. Remains NPO. IV fluids discontinued per hospitalist. No nonverbal cues of pain or discomfort noted. Pt CO2 critical, and pt placed on BiPap per respiratory. Pulmonology consulted. Fall precautions maintained.

## 2021-09-27 NOTE — CM/SW NOTE
MSW spoke with medical staff who are stating the patient is appropriate for hospice care and they will speak with family. MSW will await hospice order to send referrals. MSW did follow up with [facility] for follow up and [facility].   If the pat

## 2021-09-27 NOTE — PROGRESS NOTES
Lethargic throughout day. Unable to take PO. MRI and EEG done. More awake when family came. Eyes open, making eye contact. Family fed pt yogurt despite NPO order. They are aware of aspiration risk.  No coughing noted w/yogurt but after thin liquids congeste

## 2021-09-27 NOTE — CONSULTS
[address] [address]27   Neurology; INITIAL Consultation VISIT  2021, 9:44 AM     Devon Trevino Patient Status:  Inpatient    1924 MRN [id number]   [facility] 4NW-A Attending Korey Carrion MD     PCP Starla Rodgers daily with meals. nystatin-triamcinolone 568508-9.9 UNIT/GM-% External Cream, Apply topically 4 (four) times daily as needed. docusate sodium 100 MG Oral Cap, Take 100 mg by mouth as needed. simvastatin 20 MG Oral Tab, Take 20 mg by mouth nightly. 99 mg/dL   POCT Glucose    Collection Time: 09/27/21  5:16 AM   Result Value Ref Range    POC Glucose 248 (H) 70 - 99 mg/dL        IMAGING:  MRI BRAIN (CPT=70551)    Result Date: 9/26/2021  CONCLUSION: 1. Multiple tiny scattered acute lacunar infarcts.   Em

## 2021-09-27 NOTE — SLP NOTE
SLP evaluation attempted. Patient awake in bed with eyes open. However, she did not follow commands or track with eyes. Moistened oral swab provided and patient made no attempt to manipulate oral cavity.  No PO trials provided at this time as patient unable

## 2021-09-27 NOTE — PROGRESS NOTES
[facility]     Hospitalist Progress Note     Keegan Bullard Patient Status:  Inpatient    1924 MRN [id number]   [facility] 4NW-A Attending Viky Taylor MD   [facility] Day # 12 PCP Mak Scott MD     Chief Complaint: con results for input(s): CRP, HANNA, LDH, DDIMER in the last 168 hours. Imaging: Imaging data reviewed in Epic.     Medications:   • Insulin Aspart Pen  1-68 Units Subcutaneous TID CC and HS   • ampicillin  2 g Intravenous Q8H   • amLODIPine  10 mg Oral Daily

## 2021-09-27 NOTE — CONSULTS
Pulmonary H&P/Consult     NAME: [id number]: [id number] - MRN: [id number] - Age: 80year old - :  1924    Date of Admission: 2021  4:55 PM  Admission Diagnosis: Hyponatremia [E87.1]    Assessment/Plan:  Acute on chronic hyperca Subcutaneous TID CC and HS    ampicillin  2 g Intravenous Q8H    amLODIPine  10 mg Oral Daily    Heparin Sodium (Porcine)  5,000 Units Subcutaneous 2 times per day    clopidogrel  75 mg Oral Daily    atorvastatin  10 mg Oral Nightly        ALLERGIES: No Kn

## 2021-09-27 NOTE — PLAN OF CARE
I called pt son [place] and updated him on critical CO2, resp failure. I recommended hospice as this has been discussed for past several days with many new findings/events. I d/w him this is the natural dying process.   He insisted on pulmonology evaluation

## 2021-09-27 NOTE — PROGRESS NOTES
[facility]                INFECTIOUS DISEASE PROGRESS NOTE    Johnella Boast Patient Status:  Inpatient    1924 MRN [id number]   [facility] 4NW-A Attending Tao Menon MD   Hosp Day # 16 PCP Kody Holloway MD Specimen: Urine, clean catch   Result Value Ref Range    Urine Culture 50,000-99,000 CFU/ML Enterococcus species not VRE (A) N/A         Radiology        Problem list reviewed:  Patient Active Problem List:     Traumatic closed fracture of C2 vertebra with

## 2021-09-28 NOTE — PROGRESS NOTES
[id number] [facility] Progress Note    Joe Galvez Patient Status:  Inpatient    1924 MRN [id number]   [facility] 4NW-A Attending Keon Connell MD   Hosp Day # 16 PCP Gonzalez Lake MD     NEUROLOGY   Attending Time: 09/28/21 11:23 AM   Result Value Ref Range    ABG pH 7.41 7.35 - 7.45    ABG pCO2 61 (HH) 35 - 45 mm Hg    ABG pO2 135 (H) 80 - 105 mm Hg    ABG HCO3 37.9 (H) 22.0 - 26.0 mEq/L    ABG Base Excess 11.6 (H) -2.0 - 2.0 mmol/L    ABG O2 Saturation 97 92 strokes      Discussion/Recommendations:     Overall prognosis guarded  Recommendations unchanged      Discussed case with care team  Agree with plans as outlined in their notes.         For this visit:    (   ) ICU >35 minutes total time   Available within (TYLENOL) tab 650 mg, 650 mg, Oral, Q6H PRN  ondansetron (ZOFRAN) injection 4 mg, 4 mg, Intravenous, Q6H PRN        REVIEW OF SYSTEMS:  A 10-point system was reviewed. Pertinent positives and negatives are noted in HPI.       PHYSICAL EXAMINATION:  VITAL S investigating the vascular problem leading to stroke may not lead to any change in intervention for secondary stroke prevention. Priority is to treat infection and can check CUS.     Plan:  Already on clopidogrel, can consider adding ASA 81 mg   Echo on 9/

## 2021-09-28 NOTE — PLAN OF CARE
Patient is unresponsive with physical/verbal stimulation. On continuous BPAP. Vitals stable stable at this time. RT at bedside doing further eval re need for continuous BPAP. Pending CXR & ABG this am. Kept comfortable.

## 2021-09-28 NOTE — PROGRESS NOTES
[facility]                INFECTIOUS DISEASE PROGRESS NOTE    Tone Rosales Patient Status:  Inpatient    1924 MRN [id number]   [facility] 4NW-A Attending Raymond Hernandez MD   Hosp Day # 16 PCP Daljit Claros MD Culture Result No Growth 5 Days N/A   2.  Urine Culture, Routine     Status: Abnormal    Collection Time: 09/11/21  5:31 PM    Specimen: Urine, clean catch   Result Value Ref Range    Urine Culture 50,000-99,000 CFU/ML Enterococcus species not VRE (A) N/A

## 2021-09-28 NOTE — PROGRESS NOTES
Pulmonary Progress Note      NAME: Karolyn Recio - ROOM: 6/[facility] - MRN: [id number] - Age: 80year old - : 1924    Assessment/Plan:  1.  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

## 2021-09-28 NOTE — PROGRESS NOTES
[facility]     Hospitalist Progress Note     Billychris Winter Patient Status:  Inpatient    1924 MRN [id number]   [facility] 4NW-A Attending Berna Leblanc MD   Hosp Day # 16 PCP Ya Paul MD     Chief Complaint: Jack Tolbert LDH, DDIMER in the last 168 hours. Imaging: Imaging data reviewed in Epic.     Medications:   • Insulin Aspart Pen  1-68 Units Subcutaneous TID CC and HS   • ampicillin  2 g Intravenous Q8H   • amLODIPine  10 mg Oral Daily   • Heparin Sodium (Porcine)  5

## 2021-09-28 NOTE — PLAN OF CARE
Patient remains non verbal & lethargic,moves arms occasionally w/ physical stimulation but eyes remains tightly closed. Patient kept clean & dry. NPO maintained. Not tolerating due p.o meds. Meds held. Pending palliative consult.  Patient's son spoke to pul R/L

## 2021-09-28 NOTE — SLP NOTE
Patient on BiPAP and not appropriate for SLP evaluation at this time. SLP will continue to monitor and evaluate when medically appropriate.

## 2021-09-29 NOTE — PLAN OF CARE
Assumed patient care at 1. Patient lethargic, non-verbal. Maintained on BPAP with O2 sats 100%. Afebrile. Strict NPO. Blood glucose monitored. Maintained on IV antibiotics for Bacteremia. Sleeping in bed at this time, seems to be comfortable.  No signs a

## 2021-09-29 NOTE — SLP NOTE
Patient remains on BiPAP and unable to participate with SLP evaluation. Will place SLP order on hold, monitor patient progress, and evaluate when medically appropriate.

## 2021-09-29 NOTE — PROGRESS NOTES
[id number] [facility] Progress Note    Cordeliajarrett Neri Patient Status:  Inpatient    1924 MRN [id number]   [facility] 4NW-A Attending Earnest Potter MD   [facility] Day # 25 PCP Nkechi Spangler MD     CC: AMS, abnormal system was reviewed. Pertinent positives and negatives are noted in HPI.       PHYSICAL EXAMINATION:  VITAL SIGNS: /55 (BP Location: Right arm)   Pulse 80   Temp 98.1 °F (36.7 °C) (Axillary)   Resp 17   Ht 63\"   Wt 102 lb 1.2 oz (46.3 kg)   SpO2 99% in intervention for secondary stroke prevention.  Priority is to treat infection and can check CUS.    Plan:  Already on clopidogrel, added ASA 81 mg, but pt currently unable to take PO medications due to lethargy.    mg AL until able to take PO med

## 2021-09-29 NOTE — CM/SW NOTE
Spoke w/Nelly Wylie about care conference and she spoke w/MD Lindsay Cody. They stated the son is firm and would like to continue the current level of care even though the pt is appropriate for hospice.

## 2021-09-29 NOTE — CONSULTS
Palliative care team consulted to assist with goals of care discussion. Pt not interactive, on BIPAP. I called pt's son on phone and he politely declined Palliative care consultation.        Juancarlos Malik MD

## 2021-09-29 NOTE — PLAN OF CARE
Patient lethargic for most of the day. When pt family came she did open her eyes a bit more. Pt is still receiving IV abx and fluids. Pt is NPO. Due to this neuro put on 300mg rectal aspirin suppository. Blood was noted on pts lip from bipap.  Took bipap of Monitor for areas of redness and/or skin breakdown  - Initiate interventions, skin care algorithm/standards of care as needed  Outcome: Progressing     Problem: GENITOURINARY - ADULT  Goal: Absence of urinary retention  Description: INTERVENTIONS:  - Asses environment  Outcome: Progressing

## 2021-09-29 NOTE — PROGRESS NOTES
Pulmonary Progress Note      NAME: Major Poag - ROOM: 814/277-X - MRN: [id number] - Age: 80year old - : 1924    Assessment/Plan:  1.  Acute on chronic hypercapnic respiratory failure  - ABG with some compensated component given pH  - Priyank Donald extra heart sounds   Respiratory: CTAB   GI: Soft, NTND, +normoactive BS   Ext: No cyanosis, clubbing or edema       Recent Labs   Lab 09/27/21  1207   RBC 3.45*   HGB 9.3*   HCT 31.8*   MCV 92.2   MCH 27.0   MCHC 29.2*   RDW 15.7   NEPRELIM 9.52*   WBC 10

## 2021-09-29 NOTE — PROGRESS NOTES
[facility]     Hospitalist Progress Note     Marybel Lamar Patient Status:  Inpatient    1924 MRN [id number]   [facility] 4NW-A Attending Linell Mcburney MD   [address] Day # 25 PCP Raj Charles MD     Chief Complaint: Juanita Baker 168 hours. Imaging: Imaging data reviewed in Epic.     Medications:   • aspirin  300 mg Rectal Daily   • aspirin  81 mg Oral Daily   • Insulin Aspart Pen  1-68 Units Subcutaneous TID CC and HS   • ampicillin  2 g Intravenous Q8H   • Heparin Sodium (Porci

## 2021-09-29 NOTE — PROGRESS NOTES
[facility]                INFECTIOUS DISEASE PROGRESS NOTE    Ki Macias Patient Status:  Inpatient    1924 MRN [id number]   [facility] 4NW-A Attending Tomas Conteh MD   Hosp Day # 25 PCP Misty Suarez MD Blood Culture Result No Growth 5 Days N/A   2.  Urine Culture, Routine     Status: Abnormal    Collection Time: 09/11/21  5:31 PM    Specimen: Urine, clean catch   Result Value Ref Range    Urine Culture 50,000-99,000 CFU/ML Enterococcus species not VRE (A)

## 2021-09-30 NOTE — PLAN OF CARE
Pt still very lethargic. Opens eyes only spontaneously. Potassium replaced per protocol. Pt still on 4 L nc. Sats @ 100% IV fluids and abx are continued. Pt remains NPO. Consult to bioethics was placed to further discuss goals of care.         Problem: RESP nutritional intake (undernourished)  Description: INTERVENTIONS:  - Monitor percentage of each meal consumed  - Identify factors contributing to decreased intake, treat as appropriate  - Assist with meals as needed  - Monitor I&O, WT and lab values  - Obta

## 2021-09-30 NOTE — PROGRESS NOTES
[facility]                INFECTIOUS DISEASE PROGRESS NOTE    Louis Oas Patient Status:  Inpatient    1924 MRN [id number]   [facility] 4NW-A Attending Paula Alexandre MD   Hosp Day # 23 PCP Virginie Gonzalez MD Blood Culture Result No Growth 5 Days N/A   2.  Urine Culture, Routine     Status: Abnormal    Collection Time: 09/11/21  5:31 PM    Specimen: Urine, clean catch   Result Value Ref Range    Urine Culture 50,000-99,000 CFU/ML Enterococcus species not VRE

## 2021-09-30 NOTE — PROGRESS NOTES
[id number] [facility] Progress Note    Nicole Villanueva Patient Status:  Inpatient    1924 MRN [id number]   [facility] 4NW-A Attending Joselito Flores MD   [address] Day # 23 PCP Katherine Hair MD     NEUROLOGY   Attending 09/30/21  7:46 AM   Result Value Ref Range    Glucose 177 (H) 70 - 99 mg/dL    Sodium 156 (H) 136 - 145 mmol/L    Potassium 2.7 (LL) 3.5 - 5.1 mmol/L    Chloride 115 (H) 98 - 112 mmol/L    CO2 42.0 (HH) 21.0 - 32.0 mmol/L    Anion Gap <0 (L) 0 - 18 mmol/L noxious stimuli, nonverbal, PERRL 2 mm reactive, blinks to threat, does not track, eyes midline, face appears symmetrical in neurtral position, does not follow commands, grimaces to noxious stimuli in BUE, some spontaneous movement noted in BUE, BLE no spo fracture of C2 vertebra with minimal displacement (HCC)     Traumatic closed fracture of C2 vertebra with minimal displacement, sequela     Dementia (HCC)     Altered awareness, transient     Palliative care encounter     Goals of care, counseling/discussi [id number]

## 2021-09-30 NOTE — DIETARY MALNUTRITION NOTE
[facility]    NUTRITION ASSESSMENT    Pt meets severe malnutrition criteria.     CRITERIA FOR MALNUTRITION DIAGNOSIS:  Criteria for severe malnutrition diagnosis: acute illness/injury related to energy intake less than 50% for greater than 5 days, body Allergies: No  Cultural/Ethnic/Episcopalian Preferences Addresses: Yes    GI SYSTEM REVIEW:     NUTRITION RELATED PHYSICAL FINDINGS:     1. Body Fat/Muscle Mass: moderate depletion body fat and moderate depletion muscle mass      2.  Fluid Accumulation: none

## 2021-09-30 NOTE — PROGRESS NOTES
[facility]     Hospitalist Progress Note      Patient Status:  Inpatient    1924 MRN [id number]   [facility] 4NW-A Attending Antonieta Prieto MD   [facility] Day # 23 PCP Levi Munroe MD     Chief Complaint: Socorro Rodney for input(s): CK in the last 168 hours. Inflammatory Markers  No results for input(s): CRP, HANNA, LDH, DDIMER in the last 168 hours. Imaging: Imaging data reviewed in Epic.     Medications:   • potassium chloride  40 mEq Intravenous Once    Followed by

## 2021-09-30 NOTE — SLP NOTE
ADULT SWALLOWING EVALUATION    ASSESSMENT    ASSESSMENT/OVERALL IMPRESSION:  Patient is a 79 y/o female known to this service for previous dysphagia assessment.  Most recent recommendations made for patient to initiate a pureed diet and mildly thick liquids UTI (urinary tract infection)    Bladder wall thickening    Enterococcal bacteremia    Sinus tachycardia    Hypokalemia    Metabolic alkalosis    Hypernatremia    Alkalosis    Encephalopathy acute    Hx of ischemic multifocal multiple vascular territories cannot be evaluated clinically.  Videofluoroscopic Swallow Study is required to rule-out silent aspiration.)    Esophageal Phase of Swallow: No complaints consistent with possible esophageal involvement  Comments: d/w RN              GOALS  Goal #1 Patient

## 2021-09-30 NOTE — PROGRESS NOTES
Pulmonary Progress Note      NAME: Marybel Lamar - ROOM: 169/903-W - MRN: [id number] - Age: 80year old - : 1924    Assessment/Plan:  1.  Acute on chronic hypercapnic respiratory failure  - ABG with some compensated component given pH  - [employer] bruising  Eyes: EOMI, no icterus   Ears, Nose, Throat, Mouth: OP clear, dry MM  Neck: Supple, no adenopathy or elevation in JVP  Cardiovascular: RRR, no murmurs or extra heart sounds   Respiratory: Diminished breath sounds in the anterior lung fields   GI:

## 2021-10-01 PROBLEM — R56.9 SEIZURE-LIKE ACTIVITY (HCC): Status: ACTIVE | Noted: 2021-01-01

## 2021-10-01 NOTE — PROGRESS NOTES
[facility]                INFECTIOUS DISEASE PROGRESS NOTE    Niyah Trevino Patient Status:  Inpatient    1924 MRN [id number]   [facility] 4NW-A Attending Burton Gauthier MD   Hosp Day # 20 PCP Barbara Terrazas MD --    ALT 48  --   --   --   --   --   --    BILT 0.3  --   --   --   --   --   --    TP 6.2*  --   --   --   --   --   --     < > = values in this interval not displayed.        No results found for: [facility] Encounter on 09/11/21 vegetation  -new findings of CVA on MRI  consider favor completing a full 28 days abx given possibility of embolic    2.  Respriatory, on bipap    Palliative care, hospice was discussed per notes, but family declined      Judene Landau, MD, MD [place]

## 2021-10-01 NOTE — PROGRESS NOTES
[id number] Neu[facility] Progress Note    Tone Augusta Patient Status:  Inpatient    1924 MRN [id number]   [facility] 4NW-A Attending Ric Iyer MD   Hosp Day # 20 PCP Daljit Claros MD       [facility] Rectal, Daily  dextrose 5% infusion, , Intravenous, Continuous  [Held by provider] aspirin chewable tab 81 mg, 81 mg, Oral, Daily  Insulin Aspart Pen (NOVOLOG) 100 UNIT/ML flexpen 1-68 Units, 1-68 Units, Subcutaneous, TID CC and HS  glucose (DEX4) oral liq Noted tremor/twitching mv in BUE, L > R as described in HPI.     Imaging/Diagnostics:  EEG (repeat) pending 10/1/21:     CUS 9/28/21:   CONCLUSION:  There is atherosclerotic disease in carotid bulbs bilaterally without significant stenosis.     EEG 9/26/21: medications due to lethargy.    mg [place] until able to take PO meds  Continue to treat underlying medical conditions,  Appreciate recs from [facility], ID  PT/OT/ST as able   Hospice/Palliative - son continues to request aggressive maximum care  Repeat EEG pen

## 2021-10-01 NOTE — PLAN OF CARE
Pt very lethargic. Episode of shaking to BUE this AM. EEG ordered. Pt started on vimpat IV per neuro MD. K+ 2.9 this AM, replaced per protocol. Pt on bipap, sats @ 100%. Pt NPO. IV abx.     1600- bladder scan 450+, straight cath per MD. 400ml out.

## 2021-10-01 NOTE — PROGRESS NOTES
10/01/21 0305   BiPAP   $ RT Standby Charge (per 15 min) 1   Device V60   BiPAP / CPAP CE# [id number]   BiPAP bacteria filter Yes   Mode AVAPS   Interface Full face mask   Mask Size Small   Control Settings   Set Rate 16 breaths/min   Set EPAP 6   Oxygen Perc

## 2021-10-01 NOTE — PROGRESS NOTES
[facility]     Hospitalist Progress Note     Chloe Griffith Patient Status:  Inpatient    1924 MRN [id number]   [facility] 4NW-A Attending Juan Miguel Randle MD   Hosp Day # 21 PCP Fam Rodgers MD     Chief Complaint: Mitesh Anguloer Creatinine Clearance: 35.6 mL/min (based on SCr of 0.66 mg/dL). No results for input(s): PTP, INR in the last 168 hours.          COVID-19 Lab Results    COVID-19  Lab Results   Component Value Date    COVID19 Not Detected 09/11/2021       Pro-Calcitonin care: Pt very appropriate for hospice however family declined. Many [address] discussions completed. Neuro notified of possible myoclonic tremor or seizure. Check EEG given recent stroke. D/w Neuro PA and examined w/ RN at bedside.   Updating MD. Castañeda Dire

## 2021-10-02 NOTE — PROGRESS NOTES
[facility]     Hospitalist Progress Note     Billychris Winter Patient Status:  Inpatient    1924 MRN [id number]   [facility] 4NW-A Attending Delilah Celeste MD   Hosp Day # 21 PCP Ya Paul MD     Chief Complaint:confusi Estimated Creatinine Clearance: 35.6 mL/min (based on SCr of 0.66 mg/dL). No results for input(s): PTP, INR in the last 168 hours.          COVID-19 Lab Results    COVID-19  Lab Results   Component Value Date    COVID19 Not Detected 09/11/2021 declined. Many [address] discussions completed. Neuro notified of possible myoclonic tremor or seizure. Check EEG given recent stroke. D/w Neuro PA and examined w/ RN at bedside.   Updating MD.  Padmaja Wade MD    Supplementary Documentation:

## 2021-10-02 NOTE — PROCEDURES
ELECTROENCEPHALOGRAM REPORT      Patient Name: Chloe Griffith  Chart ID: [id number]  Ordering Physician: No name on file.  Date of Test: 10/1/2021  Referring Physician:   Patient Diagnosis: AMS    HISTORY  Chloe Griffith is a with [facility] advance

## 2021-10-02 NOTE — PLAN OF CARE
Problem: Patient/Family Goals  Goal: Patient/Family Short Term Goal  Description: Patient's Short Term Goal: improved infection    Interventions:   - IV abx  - See additional Care Plan goals for specific interventions  Outcome: Progressing     Problem: R adequate nutritional intake (undernourished)  Description: INTERVENTIONS:  - Monitor percentage of each meal consumed  - Identify factors contributing to decreased intake, treat as appropriate  - Assist with meals as needed  - Monitor I&O, WT and lab value bladder  - Monitor intake/output and perform bladder scan as needed  - Follow urinary retention protocol/standard of care  - Consider collaborating with pharmacy to review patient's medication profile  - Implement strategies to promote bladder emptying  Lynette Morales

## 2021-10-02 NOTE — PROGRESS NOTES
[facility]                INFECTIOUS DISEASE PROGRESS NOTE    Trenton Hightower Patient Status:  Inpatient    1924 MRN [id number]   [facility] 4NW-A Attending Gumaro Mccormick MD   Hosp Day # 21 PCP Yuri Bradshaw MD < > 2.7*   < > 2.9* 3.6 3.2*   *   < > 115*  --  112  --  107   CO2 36.0*   < > 42.0*  --  40.0*  --  39.0*   ALKPHO 73  --   --   --   --   --   --    AST 15  --   --   --   --   --   --    ALT 48  --   --   --   --   --   --    BILT 0.3  --   -- vascular territories stroke     Toxic encephalopathy     Seizure-like activity (HCC)            ASSESSMENT/PLAN:  1.  Enterococcus bactermia  Presented with vaginal bleeding  Urine culture also showing enterococcus  Echo no vegetation  -new findings of CVA

## 2021-10-03 NOTE — PROGRESS NOTES
[facility]                INFECTIOUS DISEASE PROGRESS NOTE    Adan Beckman Patient Status:  Inpatient    1924 MRN [id number]   [facility] 4NW-A Attending Kassi Beard MD   Hosp Day # 25 PCP Kailash Villanueva MD --    ALB 2.3*  --   --   --   --   --   --   --   --    *   < > 156*  --  151*  --   --  145  --    K 3.6   < > 2.7*   < > 2.9*   < > 3.6 3.2* 4.0   *   < > 115*  --  112  --   --  107  --    CO2 36.0*   < > 42.0*  --  40.0*  --   --  39.0*  - thickening     Enterococcal bacteremia     Sinus tachycardia     Hypokalemia     Metabolic alkalosis     Hypernatremia     Alkalosis     Encephalopathy acute     Hx of ischemic multifocal multiple vascular territories stroke     Toxic encephalopathy     Se

## 2021-10-03 NOTE — PROGRESS NOTES
[facility]    Progress Note    Luanne Saini Patient Status:  Inpatient    1924 MRN [id number]   [facility] 4NW-A Attending Meredith العلي MD   [facility] Day # 25 PCP S[place] Sicard, MD     Subjective:  Luanne Saini complication, without long-term current use of insulin (HCC)     Syncope, unspecified syncope type     Leukocytosis     Azotemia     Hyperglycemia     Acute cystitis with hematuria     Pneumonia, bacterial     Benign essential HTN     Prediabetes     UTI (

## 2021-10-03 NOTE — PLAN OF CARE
Problem: Patient/Family Goals  Goal: Patient/Family Short Term Goal  Description: Patient's Short Term Goal: improved infection    Interventions:   - IV abx  - See additional Care Plan goals for specific interventions  Outcome: Progressing     Problem: G breakdown  - Initiate interventions, skin care algorithm/standards of care as needed  Outcome: Progressing     Problem: GENITOURINARY - ADULT  Goal: Absence of urinary retention  Description: INTERVENTIONS:  - Assess patient’s ability to void and empty thais

## 2021-10-04 PROBLEM — Z51.5 END OF LIFE CARE: Status: ACTIVE | Noted: 2017-07-07

## 2021-10-04 NOTE — SLP NOTE
Attempted to see patient today for continued evaluation of oropharyngeal swallow and readiness to initiate PO intake. Discussed with RN, patient remains lethargic and unable to follow commands. Therefore, swallow evaluation deferred at this time.  Most rece

## 2021-10-04 NOTE — PROGRESS NOTES
[facility]     Hospitalist Progress Note     Aleena Hitchcock Patient Status:  Inpatient    1924 MRN [id number]   [facility] 4NW-A Attending Terry Watkins MD   Hosp Day # 25 PCP Brent Diallo MD     Chief Complaint: vagina displayed. Estimated Creatinine Clearance: 37.9 mL/min (based on SCr of 0.62 mg/dL). No results for input(s): PTP, INR in the last 168 hours.          COVID-19 Lab Results    COVID-19  Lab Results   Component Value Date    COVID19 Not Detected 09/1 completed.    Neuro notified of possible myoclonic tremor or seizure.              Plan of care: hospice when ok with family    Plan of care discussed with RN    Aakash Bernardo MD    Supplementary Documentation:     Quality:  · DVT Prophylaxis: scd  · CODE sta

## 2021-10-04 NOTE — PROGRESS NOTES
Pulmonary Progress Note        NAME: Niyah Trevino - ROOM: 415/415-A - MRN: [id number] - Age: 80year old - : 1924        Last 24hrs: No events overnight, pt started on morphine gtt earlier today, currently w/ agonal resps but per son, she AMS/dementia/strokes are contributing  - AVAPS as needed  - Chest xray with evidence of pulmonary edema  2. Pulmonary edema  -dose of lasix now  3. Enterococcus bacteremia   - On antibiotics per ID  4.  Stroke  - Multiple punctate strokes in the right hemis

## 2021-10-04 NOTE — PLAN OF CARE
Pt is drowsy, nonverbal. Pt is on 5L of O2 NC with labored breathing and accessory muscle use. Guevara catheter is draining. Family at bedside. Pt is on hospice care. Morphine drip is at 2mg/hr.      Fall risk and seizure protocol followed, call light withi

## 2021-10-04 NOTE — DIETARY MALNUTRITION NOTE
[facility]    NUTRITION ASSESSMENT    Pt meets severe malnutrition criteria.     CRITERIA FOR MALNUTRITION DIAGNOSIS:  Criteria for severe malnutrition diagnosis: acute illness/injury related to energy intake less than 50% for greater than 5 days, body Date/Time Percent Meals Eaten (%)    10/01/21 2041 —     10/02/21 2029 —     Comments:   Percent Meals Eaten (%): npo at 10/01/21 2041   Percent Meals Eaten (%): npo at 10/02/21 2029       FOOD/NUTRITION RELATED HISTORY:   Appetite: Poor  Intake: 0%  I

## 2021-10-04 NOTE — PROGRESS NOTES
[facility]                INFECTIOUS DISEASE PROGRESS NOTE    Moris pUton Patient Status:  Inpatient    1924 MRN [id number]   [facility] 4NW-A Attending Tanvi Da Silva MD   Hosp Day # 23 PCP Lakia Verdugo MD Blood Culture     Status: None    Collection Time: 09/14/21 11:51 AM    Specimen: Blood,peripheral   Result Value Ref Range    Blood Culture Result No Growth 5 Days N/A   2.  Urine Culture, Routine     Status: Abnormal    Collection Time: 09/11/21  5:31 PM

## 2021-10-04 NOTE — PLAN OF CARE
Pt received drowsy, eyes opening to firm physical touch, nonverbal. Labored breathing with accessory muscle use noted. SpO2 >92% on 5L O2 via NC. Son and daughter-in-law at bedside, agreeable to hospice care per hospitalist. Morphine gtt started at 1mg/hr.

## 2021-10-04 NOTE — PROGRESS NOTES
[facility]     Hospitalist Progress Note     Ermias Tristan Patient Status:  Inpatient    1924 MRN [id number]   [facility] 4NW-A Attending Dr. Ambika Eastman # 21 PCP Rachele Ruiz MD     Chief Complaint: vaginal ble --   --   --   --     < > = values in this interval not displayed. Estimated Creatinine Clearance: 37.9 mL/min (based on SCr of 0.62 mg/dL). No results for input(s): PTP, INR in the last 168 hours.          COVID-19 Lab Results    COVID-19  Lab Re distress  CVS: s1s2  Resp: dec AE, accessory muscle uses  Abd: soft, +BS  Neuro: unable to examine  Integ: no obvious rash or cyanosis    -discussed with son at bedside in detail  -pt has poor prognosis and is hospice appropriate  -reviewed entire course o

## 2023-11-03 NOTE — PROGRESS NOTES
NURSING DISCHARGE NOTE    Discharged Other, (see nursing note) via Cart  .   Accompanied by staff  Belongings none      Patient passed comfortably, No spontaneous respiration noted, No palpable carotid pulse, No audible heart sound,  Post mortem care do No

## 2024-12-17 NOTE — PLAN OF CARE
Diabetes/Glucose Control    • Glucose maintained within prescribed range Progressing        Impaired Activities of Daily Living    • Achieve highest/safest level of independence in self care Progressing        Impaired Functional Mobility    • Achieve high Urine Pregnancy Test Result: negative Lot # (Optional): [id number] Expiration Date (Optional): 12/17/2025 Detail Level: None Performed By: LATRICE Lopes

## 2024-12-18 NOTE — PROCEDURES
[facility] - ELECTROENCEPHALOGRAM (EEG) REPORT  Patient Name:  Devon Arriaza   MRN / CSN:  [id number] / [id number]   Date of Birth / Age:  1/21/1924 /  80year old   Encounter Date:  7/6/17         METHODS:  Twenty-two electrodes were applied according to the 10-20-e
Yes

## 2025-04-04 NOTE — DISCHARGE SUMMARY
[facility] [place] HOSPITALIST  DISCHARGE SUMMARY     Ermias Tristan Patient Status:  Inpatient    1924 MRN [id number]   [facility] 4NW-A Attending Laquetta Severe, MD   [facility] Day # 25 PCP Rachele Ruiz MD     Date of Admission: 2021 weaning off bipap/O2. She required straight cath/meléndez for retention. She developed BUE tremor and EEG confirmed large sharp waves b/l. She was started on vimpat IV. We had ongoing [address] discussions during this stay.   Family ready for hospice on 10/4 and 04-Apr-2025 00:00

## (undated) NOTE — IP AVS SNAPSHOT
Patient Demographics     Address  [address] [place] [address] Phone  [phone] [facility])  [phone] [facility]      Emergency Contact(s)     Name Relation Home Work [place] Son [phone]      Allergies as Commonly known as:  OYSTER-D  Notes to patient:  HOLD FOR PATIENT IS NPO. HOLD UNTIL SPEECH THERAPY SAYS ITS OKAY FOR ORAL       Take 1 tablet by mouth 2 (two) times daily with meals.    Natalie Lopez MD         Clopidogrel Bisulfate 75 MG Tabs  Commonly k acetaminophen 325 MG Tabs  Calcium Carbonate-Vitamin D 250-125 MG-UNIT Tabs  folic acid 1 MG Tabs  Insulin Aspart Pen 100 UNIT/ML Sopn  Thiamine HCl 100 MG Tabs           [id number] - MAR ACTION REPORT  (last 24 hrs)    ** SITE UNKNOWN **     Order ID Medic Slide Review Slide reviewed,normal RBC and platelet morphology. [facility]            POTASSIUM [[id number]] (Abnormal)  Resulted: 07/22/17 0612, Result status: Final result   Ordering provider:  Helen Saravia MD  07/21/17 2300 Resulting lab:  [facility] Ordering provider:  Miguel Angel Cheung MD  07/21/17 2300 Resulting lab:  [facility]   Narrative: The following orders were created for panel order CBC WITH DIFFERENTIAL WITH PLATELET.   Procedure                               Abnormality         Status HISTORY OF PRESENT ILLNESS:  Patient is an elderly female with history of neck pain and a fall around 3 weeks back and sustained a C2 fracture and was treated and admitted in another outside hospital.  Apparently presented to the ER from [facility] w Dr. Princess Eller. He is waiting on review films from an outside hospital, which apparently the son is bringing. 2.   Right arm fracture. At this point, repeat x-rays have been ordered. Dr. Bryan Rowan on consult. Discussed with nurse, Nan Grandchild, on floor.   3.   Type fracture. She was placed in a cervical collar and told to wear this at all times. She underwent further workup at our hospital and was found to have a transverse fracture of the C2 vertebra.   Neurosurgery consultation was obtained and it is recommended t •  folic acid (FOLVITE) tab 1 mg, 1 mg, Oral, Daily  •  Thiamine HCl tab 100 mg, 100 mg, Oral, Daily  •  Calcium Carbonate-Vitamin D (OYSTER-D) 250-125 MG-UNIT per tab 1 tablet, 1 tablet, Oral, BID with meals  •  dextrose 5 % and 0.9 % NaCl infusion, , Int Recent Labs   Lab  07/16/17   0532  07/17/17   0736  07/18/17   0646   RBC  3.42*  3.64*  3.86   HGB  9.6*  10.0*  10.8*   HCT  31.4*  32.5*  34.6   MCV  91.8  89.3  89.6   MCH  28.1  27.5  28.0   MCHC  30.6*  30.8*  31.2   RDW  15.0  15.0  15.0   NEPRELIM will work to see if the family is interested in either of these alternative options. 4. Further care and management per primary service.     Time spent on counseling/coordination of care:  45 Minutes    Total time spent with patient:  Mehnaz0 Gloria Almodovar No surgical intervention will be offered based on the high risk nature at this time. Perhaps the patient can have a Dobbhoff feeding tube placed under fluoroscopic guidance or perhaps the patient can have a fluoroscopic PEG tube placement as well.     Loni may use right UE as tolerated, WBAT on right UE & ROM as tolerated.  Patient stayed at outside hospital approximately one week, discharged to [facility] for KRISTIN, and admitted to [facility] 7/4/17 from [place] [address] for increasing neck/head and R should AM-PAC Score:  Score: 14  Approx Degree of Impairment: 59.67%  Standardized Score (AM-PAC Scale): 33.39  CMS Modifier (G-Code): CK    FUNCTIONAL TRANSFER ASSESSMENT  Supine to Sit : Not tested  Sit to Stand: Minimum assistance    Skilled Therapy Provided: Patient and caregiver will complete UB dressing with adherence to safety precautions  Patient and caregiver will complete LB dressing with adherence to safety precautions  Patient and caregiver will complete toileting with adherence to safety precautions Traumatic closed fracture of C2 vertebra with minimal displacement, sequela  Active Problems:    Traumatic closed fracture of C2 vertebra with minimal displacement (HCC)    Dementia    Altered awareness, transient    Palliative care encounter    Goals of Skilled Therapy Provided: Pt is oriented to self and appears lethargic but willing to participate in therapy.   Pt participated in ROM exercises on BUE, 2 sets of 5 reps or shoulder flexion/expection to 0-90 degrees, full range of motion in elbow, supinatio ADL Goals  Patient will perform upper body dressing:  with supervision --> in progress  Patient will perform lower body dressing:  with min assist (needed assist with socks at baseline) --> in progress  Patient will perform toileting: with supervision --> Medication Administration Recommendations:  (via alternative feeding route)    Treatment Plan: Dysphagia therapy (trial dysphagia therapy to include tx feeds by SLP only; trial pharyngeal strengthening exercises if patient able to follow complex commands a motility was significantly impaired  Moderate increasing to severe vallecalae and pyriform sinus residuals as the study progressed with increasing viscosities of liquids and puree textures due to poor pharyngeal squeeze.     Preparation to swallow, retrieva calcification and bony fragments along the   posterior border of the odontoid. There is some soft tissue prominence in this region also with some flattening of the ventral surface of the thecal sac. The minimum AP dimension of the canal at this level is 1. Penetration was noted on the thin and puree consistencies of barium. For further details and dietary recommendations, please refer to the full report by speech pathology. Preliminary by: Mary Rodney MD        Signed by:  Etienne Olson MD  6/1 Thin Liquid, Identified Consequences of Impaired Swallow Physiology (Vfss) bolus residue in pharynx;bolus residue in oral cavity   Thin Liquid, Comment (Vfss) decreased lingual control, bolus formation, a/p transit , repetitive lingual pumping with prematu Nectar, Attempted Compensatory Maneuver multiple swallow technique;effortful (hard) swallow   Nectar Thick, Comment (Vfss) decreased lingual control, bolus formation, a/p transit, lingual pumping, premature spillage to the pyriform sinuses , decreased base swallow technique   Nectar Thick, Comment (Vfss) decreased lingual control, bolus formation, a/p transit with premature spillage to the vallecula, decreased base of tongue retraction, PPW contraction, decreased epiglottic inversion with moderate -severe va Diagnosis moderate oral;moderate-severe pharyngeal   Prognosis fair   Facilitators family support   Barriers reduced endurance;severity of deficits   Functional Level at Time of Eval (able to adequately participate, fatigues easily)   Patient/Family Goals Short term goals:   1. Patient will perform base of tongue, hyolaryngeal elevation, pharyngeal strengthening exercises x 5 each with moderate cueing.   2. Patient will tolerate tsp of puree, utilizing hard swallow, cough, reswallow , without s/sx of aspirat assess oropharyngeal swallow function and assess for compensatory strategies to improve safe swallow function.          NECTAR THICK LIQUIDS  Method of Presentation: Teaspoon  Triggered at: Base of tongue;Valleculae  Premature Spillage to: Valleculae  Delay that patient has no narcotics. Daughter-in-law present to assist with interpreting. Patient was presented nectar-thick, honey-thick or puree by spoon.  Patient demonstrated a mod-severe to severe oropharyngeal swallow with laryngeal penetration ranging from partially effective in reducing pharyngeal residuals, however, did not prevent spillover aspiration[CR.2] and large amount of penetration[CR.3].     Current dysphagia may be multifactorial- Patient is at high risk of aspiration due to overflow of pharyngeal Goal #4 Patient will perform Janis Maneuver for improved strengthening of of tongue base to promote improve pharyngeal pressures with 80% acc with max cues   Goal #5 Patient will perform resistive tongue exercises for improved strength with 80% acc with m PATHOLOGIST) filed at 7/10/2017  4:01 PM  Original Note by Yenifer Galan, BECKY (SPEECH-LANGUAGE PATHOLOGIST) filed at 7/10/2017  3:56 PM         ADULT VIDEOFLUOROSCOPIC SWALLOWING STUDY    Admission Date: 7/4/2017  Evaluation Date: 07/10/17  Radiologi Swallow Study completed. Patient demonstrated a severe oropharyngeal swallow with greater than trace laryngeal penetration with subsequent aspiration after the swallow with nectar-thick and honey-thick consistencies.  Weak cough was present in response to t POC: TRIAL dysphagia therapy with focus on pharyngeal strengthening and TRIAL therapeutic feeding of puree only by SLP department only. Radiographic Imaging:   CT of Cervical Spine:   Impression   CONCLUSION:   #1.  There is a transverse fracture invo FL VIDEO Skylar Day SPEECH -- 6/14/2017 10:45 AM        Indication: Dysphagia        Technique:  A fluoroscopic video swallow examination was performed in conjunction with speech pathology.     Multiple consistencies of barium were administered under fluoros coordination;decreased contraction;pyriform pooling;decreased epiglottic inversion;pharyngeal wall residue;vallecular residue;moderate;mild;repeated swallows   Thin Liquid, Rosenbek's Scale 1-->no aspiration, contrast does not enter airway   Thin Liquid, R Nectar Thick, Response to Pharyngeal Residue (Vfss) unable to clear with multiple swallows   Nectar, Response to Aspiration unproductive reflexive involuntary cough  (delayed)   Nectar Thick, Epiglottic Movement Pattern (Vfss) (reduced retroversion)   Nect Honey Thick, Response to Pharyngeal Residue (Vfss) unable to clear with multiple swallows   Honey, Response to Aspiration absent response, silent aspiration   Honey Thick, Epiglottic Movement Pattern (Vfss) (reduced retroversion)   Honey Thick, Identified residue in pharynx;bolus residue in oral cavity   Puree, Comment (Vfss) decreased lingual control, bolus formation, a/p transit with pooling to the vallecula, decreased base of tongue retraction, PPW contraction , hyolaryngeal elevation , epiglottic invers today's study but pt remains at high risk for aspiration secondary to pharyngeal residue which she cannot clear with repeated swallows.  Recommend NPO, short term alternate route of nutrition ( DHT) and dysphagia treatment with controlled trials of puree wi MD request[CR.3]      Family/Patient Goals:[CR.1]  To eat[CR.2]    Radiographic Imaging:   CXR:   Impression   CONCLUSION: COPD change with scarring without definite pneumonia. Partial visualization of a right proximal humeral fracture.       Dictated by: Pranay Cade Residue Severity, Location: Mod/Severe;Severe;Pyriform sinuses  Cleared/Reduced with: Secondary swallow  Laryngeal Penetration: During the swallow  Tracheal Aspiration:  (none)  Cough/Throat Clear Response: No  Cough/Throat Clear Effective:  (n/a)  Strateg was minimal-mild, however, moderate increasing to severe pyriform sinus residuals was demonstrated as the study progressed with increasing viscosities of liquids and puree textures due to poor pharyngeal squeeze.       Preparation to swallow, retrieval, con rehabilitation be done at this facility and not to be discharged.  Asking questions regarding rehab facilities, and medical questions- deferred to SW and medical team.[CR.3]      GOALS  Goal #1 Video swallow study to be completed (GOAL MET 7/6/17)   Goal #2 CR.5 - Britta Holliday SLP on 7/10/2017  3:56 PM               Video Swallow Study Note signed by BECKY Harris at 7/10/2017  3:56 PM  Version 2 of 4    Author:  BECKY Harris Service:  Rehab Author Type:  SPEECH-LANGUAGE PATHOLO baseline confusion. She was on pain control with Norco, but still was complaining of severe pain. At this point, she was admitted to [facility].  Dr. Kate Leigh has been consulted.   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   Prognosis guarded due to impaired cognitive status and if patient able to participate Multiple consistencies of barium were administered under fluoroscopy. Total fluoroscopy time was 1.55    min. Findings:        Penetration on the nectar thick consistency of barium and aspiration on the honey thick consistency.         For further temporal coordination of oral transit;prolonged oral transit time;reduced lingual control;premature spillage into pharynx  (lingual pumping)   Thin Liquid, Oral Control Impairments (Vfss) formation of cohesive bolus is impaired;oral containment of liquids is impaired;oral containment of liquids impaired posteriorly   [place], Pharyngeal Phase Results impaired;delayed swallow;decreased tonque base movement;reduction in laryngeal closure;decreased contraction;decreased coordination;pyriform pooling;decreased e Honey Thick, Oral Control Impairments (Vfss) formation of cohesive bolus is impaired   Honey, Pharyngeal Phase Results impaired;delayed swallow;decreased tonque base movement;decreased hyolaryngeal elevation;decreased contraction;decreased coordination;raman Puree, Pharyngeal Phase Results impaired;decreased tonque base movement;decreased contraction;decreased coordination;decreased epiglottic inversion;vallecular pooling;pharyngeal wall residue;vallecular residue;moderate-severe;mild;repeated swallow   Puree, formation, anterior-posterior propulsion with premature spillage to the pyriform sinuses.  There is decreased base of tongue retraction, posterior pharyngeal wall contraction, hyolaryngeal elevation , and epiglottic inversion with deep penetration of nectar Dementia    Altered awareness, transient    Palliative care encounter    Goals of care, counseling/discussion      Past Medical History  Past Medical History:   Diagnosis Date   • C2 cervical fracture ([facility]    • Dementia    • Diabetes ([facility]    • Wen Residue Severity, Location: Mild/Mod;Moderate;Pyriform sinuses  Cleared/Reduced with: Secondary swallow  Laryngeal Penetration: During the swallow; After the swallow  Tracheal Aspiration: After the swallow (silent)  Cough/Throat Clear Response: No  Cough/Th penetration into the superior laryngeal vestibule occurred with pudding also between successive swallows. Patient has very poor epiglottic inversion/recoil with very poor airway protection during the swallow.  Reduced hyolaryngeal elevation was noted due t route. Patient has been 6 days with no nutrition route. POC: TRIAL dysphagia therapy with focus on pharyngeal strengthening and TRIAL therapeutic feeding of puree only by SLP department only. [CR.2]    Son and daughter-in-law was educated in the results an INTERDISCIPLINARY COMMUNICATION  Reviewed results with Radiologist; agreement verbalized.       Patient Experiencing Pain: No                FOLLOW UP  Treatment Plan: Dysphagia therapy (trial dysphagia therapy to include tx feeds by SLP only; pha)  Number feeds by SLP only; trial pharyngeal strengthening exercises if patient able to follow complex commands and endurance improves)    HISTORY   Background/Objective Information:[CR.1]  Patient is a 79 yo female, resides at [address] [place] [address] who was admitted pharyngeal squeeze. Preparation to swallow, retrieval, containment were [facility]; no anterior loss. Impaired transit of boluses were noted with reduced bolus control with premature spillage into pharynx  Mastication was not tested for patient's safety.     Sw The critical value results were called to Dr. Oralia Ganser at [phone] hours on 7/4/17. Result read back was performed.       Dictated by: Ofelia Velazquez MD on 7/04/2017 at 15:48              [facility]:   [address] [place] [address] & [address] - Final result (06/15 General Observations of Patient Pt received after CT, for VFSS in Radiology suite   VFSS Swallow Observations   Radiologic Views Used for Examination (Vfss) right lateral view  (c-arm)   VFSS Thin Liquid Trial   Thin Liquid, Mode of Presentation spoon;fed Nectar, Mode of Presentation spoon;fed by clinician   Nectar Thick, Volume Presented (mL) (Vfss) 3 mL   Nectar Thick, Oral Phase Results (Vfss) impaired oral phase, signs of dysfunction present   [place], Oral Transit Impairment decreased efficiency, bolus VFSS Honey Liquid Trial   Honey, Mode of Presentation spoon;fed by clinician   Honey Thick, Volume Presented (mL) (Vfss) 3 mL   Honey Thick, Oral Phase Results (Vfss) impaired oral phase, signs of dysfunction present   Honey, Oral Transit Impairment decrea Puree, Volume Presented (mL) (Vfss) 5 mL   Puree, Oral Phase Results (Vfss) impaired oral phase, signs of dysfunction present   Puree, Oral Transit Impairment decreased efficiency, bolus oral transit;poor efficiency of oral transit, residue in oral cavity; Recommended Diet Consistency NPO, short term alternate route of nutrition   Anticipated Discharge Disposition inpatient rehabilitation facility   Evaluation Observation   Cervical/Esophageal Phase osteophytes  (c3, 4-5)   Pt with marginal improvement in or Projected Status: : Swallowing functional limitation, projected goal status CL:  At least 60% but less than 80% impaired, limited or restricted    Severity code determined by: Functional Assessment Tool used: VFSS, NOMS[CR.2]          Problem List  [facility] Cleared/Reduced with: Secondary swallow  Laryngeal Penetration: During the swallow  Tracheal Aspiration:  (none)  Cough/Throat Clear Response: No  Cough/Throat Clear Effective: No  Strategy(ies) Implemented (Nectar Thick): Liquids via spoon; Slow rate;Multi with honey-thick liquids after the swallow. No aspiration occurred with nectar-thick or puree but at high risk of spill-over aspiration of pharyngeal residuals in which patient was attempting to clear with multiple swallows.  These successive swallows assi cervical neck fracture. [CR.2]  This study is a represents the beginning of a meal (9 swallows total) with the above results- high risk of aspiration if diet is ordered and patient consuming 3 meals/day. [CR.3]  Prognosis guarded due to impaired cognitive st liquid, utilizing hard swallow, cough, reswallow , without s/sx of aspiration, with max cueing, with Speech Pathology only.    Goal #7 Reassess via VFSS if/when clinically appropriate (GOAL MET- PER MD REQUEST 7/10/17)   Goal #8  [CR.4]       EDUCATION/INST Frequent oral care to maintain oral hydration and prevent xerostomia[CR.2]    Further Follow-up:  Follow Up Needed: Yes  SLP Follow-up Date: 07/07/17         Medication Administration Recommendations:  (via alternative route)    Treatment Plan:  (Trial dys demonstrated piecemeal swallowing with decreased hyolaryngeal movement and swallow initiation per palpation. Given dysphagia history and presentation at bedside, recommend NPO.  Further recommend video swallow study to be completed to determine least restr Signed by: Jason Johnson.  MD Abelino  6/15/2017 4:38 PM     [CR.3]        FL VIDEO SWALLOW W SPEECH - Final result (06/14/2017 10:45 AM)  FL VIDEO SWALLOW W SPEECH - Final result (06/14/2017 10:45 AM)   Narrative   FL VIDEO SWALLOW W SPEECH -- 6/14/2017 10:45 AM Thin Liquid, Pharyngeal Phase Impairment material reaches pyriform sinuses prior to swallow response   Thin Liquid, Pharyngeal Phase Results impaired;delayed swallow;decreased tongue base movement;decreased hyolaryngeal elevation;decreased coordination;dec inversion;penetration during;vallecular residue;pharyngeal wall residue;mild;moderate;repeated swallow   Nectar Thick, Rosenbek's Penetration Aspiration Scale (Vfss) 5-->contrast contacts vocal cords, visible residue remains (penetration)  (delayed cough) inversion;pharyngeal wall residue;vallecular residue;aspiration after;moderate-severe;mild;repeated swallow   Honey, Rosenbek's Scale 8-->contrast passes glottis, visible subglottic residue remains, absent patient response (aspiration)   Honey Thick, Respo Puree, Rosenbek's Scale 1-->no aspiration, contrast does not enter airway   Puree, Response to Pharyngeal Residue (Vfss) unable to clear with multiple swallows   Puree, Epiglottic Movement Pattern (reduced retroversion)   Puree, Identified Consequences of inversion with deep penetration of nectar thick liquid to the vocal folds, and suspected silent aspiration after the swallow.  Moderate to moderate-severe vallecular residue is noted across all consistencies which pt is unable to clear with repeated swallow • Essential hypertension        Current Diet Consistency: NPO  Prior Level of Function: Dependent  Prior Living Situation: Skilled nursing facility  History of Recent: No recent respiratory difficulty  Precautions: Aspiration;Cervical brace      Reason for Triggered at: Valleculae  Premature Spillage to: Valleculae  Delay (seconds):  (1/2 sec)  Residue Severity, Location: Severe;Valleculae;Pyriform sinuses  Cleared/Reduced with: Attempted however ineffective; Secondary swallow  Laryngeal Penetration: After th the swallow with nectar-thick and honey-thick consistencies. Weak cough was present in response to the aspirant. Laryngeal penetration occurred with puree texture into the superior laryngeal vestibule. No cough in response to penetration.  Chel Castro Goal #1 Video swallow study to be completed (GOAL MET 7/6/17)   Goal #2[CR.1] Patient will perform effortful swallow to improve pharyngeal constriction and increased hyolaryngeal elevation with 80% with max cues[CR.2]   Goal #3[CR.1] Patient will perform M CR.6 - Louie Holliday SLP on 7/6/2017  2:32 PM  CR. 7 - Louie Holliday SLP on 7/6/2017  2:57 PM  CR. 8 - Analia Holliday SLP on 7/6/2017  3:25 PM  CR. 9 - Louie Holliday SLP on 7/6/2017  3:27 PM               Video Swallow Study Note si of severe pain. At this point, she was admitted to [facility].  Dr. Eri Masters has been consulted. She has a history of fracture of right arm/shoulder and elbow and currently, Dr. Annabella Diego is on consult and repeat x-rays have been ordered.     BSE comple sac. The minimum AP dimension of the canal at this level is 1.06 cm. The dimension of the foramen magnum   is 1.6 cm. On the lateral views, there is posterior subluxation of the lateral masses of C1 in relationship to C2.       The critical value results we Signed by:  Luis Metcalf MD  6/14/2017 1:21 PM [CR.4]          SLP note from [facility]:   Kirk Bergeron, [address] [facility]-SLP - 06/15/2017 2:53 PM CDT  Formatting of this note may be different from the original.  .slp    06/15/17 1130   General Information   General Obs a/p transit , repetitive lingual pumping with premature spillage to the pyriform sinuses, delayed swallow response with decreased epiglottic inversion , moderate vallecular residue which does not clear with repeated swallows   VFSS Nectar Liquid Trial   Ne a/p transit, lingual pumping, premature spillage to the pyriform sinuses , decreased base of tongue retraction, epiglottic inversion with deep penetration to the level of the vocal folds, suspected silent aspiration after the swallow, moderate vallecular r tongue retraction, PPW contraction, decreased epiglottic inversion with moderate -severe vallecular residue , mild PPW residue, silent aspiration after the swallow .  Pt unable to clear vallecular residue with multiple swallow attempts   VFSS Puree Trial Functional Level at Time of Eval (able to adequately participate, fatigues easily)   Patient/Family Goals Statement drink;eat;get stronger   Criteria for Skilled Therapeutic Interventions Met demonstrates skilled criteria for swallowing intervention   Reha 2. Patient will tolerate tsp of puree, utilizing hard swallow, cough, reswallow , without s/sx of aspiration , with moderate cueing, with Speech Pathology only. Current Status: : Swallowing functional limitation, current status CM:  At least 80% bu Delay (seconds):  (1 sec)  Residue Severity, Location: Mod/Severe;Pyriform Sinuses  Cleared/Reduced with: Attempted however ineffective; Secondary swallow  Laryngeal Penetration: After the swallow  Tracheal Aspiration: After the swallow  Cough/Throat Clear consistencies. [CR.1]  Per extensive chart review, records from [facility] was obtained. Please see above for radiology report from x2 VFSS and SLP note. SLP note from [facility] recommended NPO with alternative feeding route. [CR.5] Daughter-in-law who was pr Mastication was not tested for patient's safety. Swallow strategies assessed were multiple repeat swallows and effortful swallows which were partially effective in reducing pharyngeal residuals, however, did not prevent spillover aspiration.     Jailene Kennedy Goal #8[CR.1] Reassess via VFSS if/when clinically appropriate[CR.5]     EDUCATION/INSTRUCTION  Reviewed results and recommendations with patient[CR.1] and her daughter-in-law[CR.10]r.   Agreement/Understanding verbalized and all questions answered to their Diet Recommendations - Solids:  (alternative feeding route)  Diet Recommendations - Liquid:  (alternative feeding route)   MD to discuss feeding tube vs. Palliative care/Hospice with family[CR.1]  Frequent oral care to maintain oral hydration and prevent x language barrier and decreased cognitive status. Trial thin liquids via cup, self presented, with no overt s/s of aspiration. Trial pureed with no overt s/s of aspiration. Gurgly vocal quality noted.  Moreover, pt demonstrated piecemeal swallowing with decr Penetration on the nectar thick consistency of barium and aspiration on the honey thick consistency.        For further details and dietary recommendations, please refer to the full report by speech pathology.        Signed by: Garland Ace.  MD Abelino  6/15/20 (lingual pumping)   Thin Liquid, Oral Control Impairments (Vfss) formation of cohesive bolus is impaired;oral containment of liquids impaired posteriorly   Thin Liquid, Pharyngeal Phase Impairment material reaches pyriform sinuses prior to swallow response base movement;reduction in laryngeal closure;decreased contraction;decreased coordination;pyriform pooling;decreased epiglottic inversion;penetration during;vallecular residue;pharyngeal wall residue;mild;moderate;repeated swallow   Antonia Graham base movement;decreased hyolaryngeal elevation;decreased contraction;decreased coordination;vallecular pooling;decreased epiglottic inversion;pharyngeal wall residue;vallecular residue;aspiration after;moderate-severe;mild;repeated swallow   Honey, Rosenbe inversion;vallecular pooling;pharyngeal wall residue;vallecular residue;moderate-severe;mild;repeated swallow   Puree, Rosenbek's Scale 1-->no aspiration, contrast does not enter airway   Puree, Response to Pharyngeal Residue (Vfss) unable to clear with mu pharyngeal wall contraction, hyolaryngeal elevation , and epiglottic inversion with deep penetration of nectar thick liquid to the vocal folds, and suspected silent aspiration after the swallow.  Moderate to moderate-severe vallecular residue is noted acros • C2 cervical fracture (HCC)    • Dementia    • Diabetes ([facility] [address]    • Essential hypertension        Current Diet Consistency: NPO  Prior Level of Function: Dependent  Prior Living Situation: Skilled nursing facility  History of Recent: No recent respiratory rate;Multiple swallows;Effortful swallow  Effectiveness: Partial    PUREE  Triggered at: Valleculae  Premature Spillage to: Valleculae  Delay (seconds):  (1/2 sec)  Residue Severity, Location: Severe;Valleculae;Pyriform sinuses  Cleared/Reduced with: Attem greater than trace laryngeal penetration with subsequent aspiration after the swallow with nectar-thick and honey-thick consistencies. Weak cough was present in response to the aspirant.   Laryngeal penetration occurred with puree texture into the superior CR.6 - Sukhjinder Holliday SLP on 7/6/2017  2:32 PM  CR. 7 - Sukhjinder Holliday SLP on 7/6/2017  2:57 PM  CR. 8 - Analia Holliday SLP on 7/6/2017  3:25 PM  CR. 9 - Sukhjinder Holliday SLP on 7/6/2017  2:28 PM               Video Swallow Study Note si Signed by:  Luis Metcalf MD  6/14/2017 1:21 PM [CR.4]          SLP note from [facility]:   Kirk Bergeron, [address] [facility]-SLP - 06/15/2017 2:53 PM CDT  Formatting of this note may be different from the original.  .slp    06/15/17 1130   General Information   General Obs Swallow strategies assessed were multiple repeat swallows and effortful swallows which were partially effective in reducing pharyngeal residuals, however, did not prevent spillover aspiration.     Current dysphagia may be multifactorial- Patient is at high Reviewed results and recommendations with patient[CR.1] and her daughter-in-law[CR.8]r. Agreement/Understanding verbalized and all questions answered to their apparent satisfaction.     INTERDISCIPLINARY COMMUNICATION  Reviewed results with Radiologist; zully SLP Follow-up Date: 07/07/17         Medication Administration Recommendations:  (via alternative route)    Treatment Plan:  (Trial dysphagia therapy)    HISTORY   Background/Objective Information:  Patient is a 79 yo female, resides at [facility]y presentation at bedside, recommend NPO. Further recommend video swallow study to be completed to determine least restrictive diet and r/o aspiration. RN aware. Radiographic Imaging:   CT of Cervical Spine:   Impression   CONCLUSION:   #1.  There is a tr FL VIDEO David Abdi SPEECH - Final result (06/14/2017 10:45 AM)   Narrative   FL VIDEO SWALLOW W SPEECH -- 6/14/2017 10:45 AM        Indication: Dysphagia        Technique:  A fluoroscopic video swallow examination was performed in conjunction with speech p tongue base movement;decreased hyolaryngeal elevation;decreased coordination;decreased contraction;pyriform pooling;decreased epiglottic inversion;pharyngeal wall residue;vallecular residue;moderate;mild;repeated swallows   Thin Liquid, Rosenbek's Scale 1- contacts vocal cords, visible residue remains (penetration)  (delayed cough)   Nectar Thick, Response to Pharyngeal Residue (Vfss) unable to clear with multiple swallows   Nectar, Response to Aspiration unproductive reflexive involuntary cough  (delayed) residue remains, absent patient response (aspiration)   Honey Thick, Response to Pharyngeal Residue (Vfss) unable to clear with multiple swallows   Honey, Response to Aspiration absent response, silent aspiration   Honey Thick, Epiglottic Movement Pattern Floyd, Identified Consequences of Impaired Swallow Physiology (Vfss) bolus residue in pharynx;bolus residue in oral cavity   Floyd, Comment (Vfss) decreased lingual control, bolus formation, a/p transit with pooling to the vallecula, decreased base of tong of honey thick liquid after the swallow. Oral regurgitation not noted on today's study but pt remains at high risk for aspiration secondary to pharyngeal residue which she cannot clear with repeated swallows.  Recommend NPO, short term alternate route of nu Reason for Referral: R/O aspiration; Altered diet consistency      Family/Patient Goals:[CR.1]  To eat[CR.6]    ASSESSMENT   DYSPHAGIA ASSESSMENT  Test completed in conjunction with Radiologist.  Patient Positioned: Upright;Midline (head slighly flexed due Laryngeal Penetration: After the swallow  Tracheal Aspiration:  (none; high risk however)  Cough/Throat Clear Response: No  Cough/Throat Clear Effective:  (n/a)  Strategy(ies) Implemented (Puree):  Slow rate;Small bites and sips;Multple swallows;Effortful s swallow and at the level of the valleculae with all other trials. Reduced hyolaryngeal elevation and excursion, reduced epiglottic inversion/recoil. Delayed  swallow response with 1/2 to 1 second delay.   Pharyngeal motility was significantly impaired  Mo Goal #4[CR.1] Patient will perform Janis Maneuver for improved strengthening of of tongue base to promote improve pharyngeal pressures with 80% acc with max cues[CR.2]   Goal #5[CR.1] Patient will perform resistive tongue exercises for improved strength w :  Willa Langford (Speech and Language Pathologist)       SPEECH DAILY NOTE - INPATIENT    Evaluation Date: 07/21/17    ASSESSMENT & PLAN   ASSESSMENT  Patient seen for dysphagia therapy exercises. Patient alert and up in bedside chair.   Followed strengthening of of tongue base to promote improve pharyngeal pressures with 100% acc with min cues IN PROGRESS   Goal #5  Updated 7/21 Patient will perform resistive tongue exercises for improved strength with 100% acc with min cues IN PROGRESS   Goal #6

## (undated) NOTE — IP AVS SNAPSHOT
Patient Demographics     Address  [address] [address] Phone  [phone] [facility])  [phone] (Mobile) *Preferred* E-mail Address  [email or web address]      Emergency Contact(s)     Name Relation Home Work [address] [place] [address] José Antonio [phone] Winifred Valiente – [phone] www. [facility]. [facility] Yaa Velázquez [phone] or [phone] [email or web address]  -Randal Limon [address] [facility] Tra dose due: 9/20 am      Take 1 tablet by mouth 2 (two) times daily with meals. Susan Newell MD         clopidogrel 75 MG Tabs  Commonly known as: PLAVIX  Next dose due: 9/20 am      Take 75 mg by mouth daily.           docusate sodium 100 MG Caps  Common 09/19/21 1241 New Bag      [id number] Labetalol HCl (TRANDATE) injection 10 mg 09/19/21 0542 Given      [id number] amLODIPine (NORVASC) tab 10 mg 09/19/21 0919 Given      [id number] atorvastatin (LIPITOR) tab 10 mg 09/18/21 2042 Given      [id number] clopidogr (Susceptibility) Collected: 09/13/21 1039    Order Status: Completed Lab Status: Final result Updated: 09/16/21 0652    Specimen: Blood,peripheral      Blood Culture Result Enterococcus faecalis     Blood Culture Smear Gram positive cocci in chains    [facility] CAD who presented to ED due to vaginal bleeding. Patient [place] speaking though non-verbal at baseline per ED discussion with family. History obtained from chart review. Family reportedly has noticed vaginal bleeding with clots.  Patient also with history /52   Pulse 92   Temp 98.9 °F (37.2 °C) (Temporal)   Resp 19   Wt 121 lb 4.1 oz (55 kg)   SpO2 93%   BMI 21.48 kg/m²   General: No acute distress. HEENT: Normocephalic atraumatic. Dry mucous membranes. Neck: No carotid bruits.   Respiratory: Hiro 5. Pre-diabetes   1. Previous A1c 6.4  6. Advanced dementia  1. At baseline per family  2.  Consider palliative care eval for [facility]    Quality:  · DVT Prophylaxis: lovenox  · CODE status: full  · Guevara: no  · If COVID testing is negative, may discontinue is smoking status. She has never used smokeless tobacco. She reports that she does not drink alcohol and does not use drugs.       Allergies:  No Known Allergies    Medications:    Current Facility-Administered Medications:   •  Ampicillin Sodium (OMNIPEN) 2 g Tab, Take 20 mg by mouth nightly., Disp: , Rfl:         Review of Systems:    A comprehensive 10 point review of systems was completed. Pertinent positives and negatives noted in the the HPI.       Physical Exam:    Vital signs: Temp:  [97.8 °F (36.6 °C)-9 Urine Culture 50,000-99,000 CFU/ML Enterococcus species not VRE (A) N/A           Established Problem list:  Patient Active Problem List:     Traumatic closed fracture of C2 vertebra with minimal displacement (HCC)     Traumatic closed fracture of C2 ve HOSPITALIST  DISCHARGE SUMMARY     Jack Gerardo Patient Status:  Inpatient    1924 MRN [id number]   [facility] 4NW-A Attending Sherman Ryan MD   [facility] Day # 8 PCP Ankur Andrews MD     Date of Admission: 2021  Date of · no    Consultants:  • ID     Discharge Medication List:     Discharge Medications      START taking these medications      Instructions Prescription details   sodium chloride 0.9% SOLN 100 mL with Ampicillin Sodium 2 g SOLR 2 g      Inject 2 g into the Follow-up appointment:   MD [phone]      cystoscopy/follow up recurrent UTI    Appointments for Next 30 Days 9/19/2021 - 10/19/2021              Date Arrival Time Visit Type Nicolette 3:51 PM Date of Service: 9/17/2021  3:00 PM Status: Addendum    :  Corinne Gonzalez PT (Physical Therapist)    Related Notes: Original Note by Corinne Gonzalez PT (Physical Therapist) filed at 9/17/2021  3:50 PM        PHYSICAL THERAPY TREATMEN )    WEIGHT BEARING RESTRICTION  Weight Bearing Restriction: None                PAIN ASSESSMENT   Rating: Unable to rate          BALANCE                                                                                                                     S using zak total lift. Pt able to maintain static sitting balance in chair with supervision, instances where min assist was required to correct LoB. Sit <> stand attempted x 2, max A x 2 for partial stand.  Family encouraged to attempt STS with pt for this time. Patient discharged from Physical Therapy services. Please re-order if a new functional limitation presents during this admission. Plan is to discharge home with family and [facility] for transfer training.       PPE donned by Shira Turpin Problems:    Acute cystitis with hematuria    Benign essential HTN    UTI (urinary tract infection)    Bladder wall thickening    Enterococcal bacteremia    Sinus tachycardia      Past Medical History  Past Medical History:   Diagnosis Date   • C2 cervical Score: 24   Approx Degree of Impairment: 0%   Standardized Score (AM-PAC Scale): 61.14   CMS Modifier (G-Code): CH    FUNCTIONAL ABILITY STATUS  Gait Assessment   Gait Assistance: Not tested           Stoop/Curb Assistance: Not tested  Comment : n/a    Ski amb due to: dec in cognition, dec in strength, dec in balance, dec in ROM, dec in motor planning, dec in motor control, and hypertonicity (although slightly reduced).       The AM-PAC '6-Clicks' Inpatient Basic Mobility Short Form was completed and this pat 4    Presenting Problem: recurrent UTIs, vaginal bleeding, B heel wounds, sacral wound    History related to current admission: Pt is 80year old female admitted on 9/11/2021 from home with c/o vaginal bleeding.  Pt diagnosed with UTI, vaginal bleeding, an Dependent    ACTIVITY TOLERANCE                         O2 WALK  Oxygen Therapy  SPO2% on Oxygen at Rest:  (WNL)  At rest oxygen flow (liters per minute): 1    AM-PAC '6-Clicks' INPATIENT SHORT FORM - BASIC MOBILITY  How much difficulty does the patient cu regarding equipment needs, caregiver/ HH PT, safety, recommendations for mobility at home, self care, and overall POC. SW and RN updated and aware of session.      Patient End of Session: Up in chair; Needs met; Call light within reach; RN aware of session/ Therapy Note signed by Kyle Bower at 9/17/2021  3:34 PM  Version 3 of 6    Author: Kyle Bower Service: Babatunde Chowdary Author Type: PT Student    Filed: 9/17/2021  3:34 PM Date of Service: 9/17/2021  3:00 PM Status: Cosign Needed    : Kyle Bower (PT patient. More responsive and attentive to family in room. Patient’s self-stated goal is unknown, not voiced. OBJECTIVE  Precautions: Limb alert - right; Limb alert - left; Bed/chair alarm;  Other (Comment) (pressure relief boots, clinitron bed, ) for today's session. Goal to educate on safety and transfers for POC after discharge. Pt Total assist x 2 for bed mobility. PT /OT assist rolling R/L for zak sling placement in supine. Family educated on rolling/ sling.     Pt transferred to chair using admission. Plan is to discharge home with family and [facility] for transfer training. DISCHARGE RECOMMENDATIONS  PT Discharge Recommendations: 24 hour care/supervision; Home with home health PT/OT     PLAN  PT Treatment Plan: Bed mobility;  Patient List  Principal Problem:    Hyponatremia  Active Problems:    Acute cystitis with hematuria    Benign essential HTN    UTI (urinary tract infection)    Bladder wall thickening    Enterococcal bacteremia    Sinus tachycardia      Past Medical History  Past with a railing?: None       AM-PAC Score:  Raw Score: 24   Approx Degree of Impairment: 0%   Standardized Score (AM-PAC Scale): 61.14   CMS Modifier (G-Code): CH    FUNCTIONAL ABILITY STATUS  Gait Assessment   Gait Assistance: Not tested           Stoop/Cu returning home.      Pt remains below PLOF and amb due to: dec in cognition, dec in strength, dec in balance, dec in ROM, dec in motor planning, dec in motor control, and hypertonicity (although slightly reduced).       The AM-PAC '6-Clicks' Inpatient Basic from home with c/o vaginal bleeding.  Pt diagnosed with UTI, vaginal bleeding, and B heel wounds and sacral wound.       Therapy significant imaging (date, test, result):  n/a     Therapy significant co-morbidities:  Vascular dementia with several mild str SHORT FORM - BASIC MOBILITY  How much difficulty does the patient currently have. ..  -   Turning over in bed (including adjusting bedclothes, sheets and blankets)?: None   -   Sitting down on and standing up from a chair with arms (e.g., wheelchair, bedsid in chair; Needs met; Call light within reach; RN aware of session/findings; All patient questions and concerns addressed; Family present    ASSESSMENT   Pt is more alert compared to last session, only when family is present.  Remains flat affect and non-sonal Filed: 9/17/2021  3:09 PM Date of Service: 9/17/2021  2:59 PM Status: Signed    : Staci Jain OT (Occupational Therapist)       OCCUPATIONAL THERAPY TREATMENT NOTE - INPATIENT     Room Number: 061/054-H  Session: 1   Number of Visits to [facility] Total  -   Putting on and taking off regular upper body clothing?: Total  -   Taking care of personal grooming such as brushing teeth?: Total  -   Eating meals?: Total    AM-PAC Score:  Score: 6  Approx Degree of Impairment: 100%  Standardized Score (AM-PA Home with home health PT/OT       PLAN  OT Treatment Plan: UE strengthening/ROM; Patient/Family education; Patient/Family training  Rehab Potential : Guarded  Frequency (Obs): 3x/week      OT Goals:  All goals met 9/17  ADL Goals   Family will verbalize or expressed excellent understanding and awareness given Pt's chronic dysphagia.  Reported they are able to thicken patient's liquid and puree solids for her at home - they expressed understanding of thickening liquids although Pt was on regular, thin liquids SLP  Leighton Mon MS CCC-SLP/L, pager [phone]  Speech-LanguagePathologist      Electronically signed by BECKY Alejo on 9/17/2021  3:47 PM           Immunizations     Name Date      Pneumovax 23 defer-07/22/17     Deferral:        Future Appointments

## (undated) NOTE — IP AVS SNAPSHOT
[address]            (For Outpatient Use Only) Initial Admit Date: 2/10/2020   Inpt/Obs Admit Date: Inpt: 2/11/20 / Obs: N/A   Discharge Date:    Jimena Will:  [de-identified]   MRN: [de-identified]   CSN: [id number]   CEID: [id number] Subscriber ID:  Pt Rel to Subscriber:    Hospital Account Financial Class: Medicare    February 15, 2020

## (undated) NOTE — IP AVS SNAPSHOT
[address]            (For Outpatient Use Only) Initial Admit Date: 9/11/2021   Inpt/Obs Admit Date: Inpt: 9/11/21 / Obs: N/A   Discharge Date:    Mallika Colon:  [de-identified]   MRN: [de-identified]   CSN: [id number]   CEID: [id number] Hospital Account Financial Class: Medicaid    September 19, 2021